# Patient Record
Sex: FEMALE | Race: WHITE | NOT HISPANIC OR LATINO | Employment: OTHER | URBAN - METROPOLITAN AREA
[De-identification: names, ages, dates, MRNs, and addresses within clinical notes are randomized per-mention and may not be internally consistent; named-entity substitution may affect disease eponyms.]

---

## 2017-05-19 ENCOUNTER — APPOINTMENT (OUTPATIENT)
Dept: WOUND CARE | Facility: HOSPITAL | Age: 82
End: 2017-05-19
Attending: SURGERY

## 2021-03-12 ENCOUNTER — TRANSCRIBE ORDERS (OUTPATIENT)
Dept: ADMINISTRATIVE | Facility: HOSPITAL | Age: 86
End: 2021-03-12

## 2021-03-12 DIAGNOSIS — N18.30 STAGE 3 CHRONIC KIDNEY DISEASE, UNSPECIFIED WHETHER STAGE 3A OR 3B CKD (HCC): Primary | ICD-10-CM

## 2021-03-17 ENCOUNTER — HOSPITAL ENCOUNTER (OUTPATIENT)
Dept: ULTRASOUND IMAGING | Facility: HOSPITAL | Age: 86
Discharge: HOME | End: 2021-03-17
Admitting: INTERNAL MEDICINE

## 2021-03-17 DIAGNOSIS — N18.30 STAGE 3 CHRONIC KIDNEY DISEASE, UNSPECIFIED WHETHER STAGE 3A OR 3B CKD (HCC): ICD-10-CM

## 2021-03-17 PROCEDURE — 76775 US EXAM ABDO BACK WALL LIM: CPT

## 2021-03-30 ENCOUNTER — LAB (OUTPATIENT)
Dept: LAB | Facility: HOSPITAL | Age: 86
End: 2021-03-30

## 2021-03-30 ENCOUNTER — TRANSCRIBE ORDERS (OUTPATIENT)
Dept: ADMINISTRATIVE | Facility: HOSPITAL | Age: 86
End: 2021-03-30

## 2021-03-30 DIAGNOSIS — N17.9 ACUTE RENAL FAILURE, UNSPECIFIED ACUTE RENAL FAILURE TYPE (HCC): Primary | ICD-10-CM

## 2021-03-30 DIAGNOSIS — N17.9 ACUTE RENAL FAILURE, UNSPECIFIED ACUTE RENAL FAILURE TYPE (HCC): ICD-10-CM

## 2021-03-30 LAB
ALBUMIN SERPL-MCNC: 3.9 G/DL (ref 3.5–5.2)
ALBUMIN UR-MCNC: 2.1 MG/DL
ALBUMIN/GLOB SERPL: 1.4 G/DL
ALP SERPL-CCNC: 81 U/L (ref 39–117)
ALT SERPL W P-5'-P-CCNC: 11 U/L (ref 1–33)
ANION GAP SERPL CALCULATED.3IONS-SCNC: 10.7 MMOL/L (ref 5–15)
AST SERPL-CCNC: 15 U/L (ref 1–32)
BILIRUB SERPL-MCNC: 0.4 MG/DL (ref 0–1.2)
BUN SERPL-MCNC: 40 MG/DL (ref 8–23)
BUN/CREAT SERPL: 27.4 (ref 7–25)
CALCIUM SPEC-SCNC: 10 MG/DL (ref 8.2–9.6)
CHLORIDE SERPL-SCNC: 103 MMOL/L (ref 98–107)
CO2 SERPL-SCNC: 23.3 MMOL/L (ref 22–29)
CREAT SERPL-MCNC: 1.46 MG/DL (ref 0.57–1)
CREAT UR-MCNC: 58.5 MG/DL
GFR SERPL CREATININE-BSD FRML MDRD: 34 ML/MIN/1.73
GLOBULIN UR ELPH-MCNC: 2.7 GM/DL
GLUCOSE SERPL-MCNC: 119 MG/DL (ref 65–99)
MICROALBUMIN/CREAT UR: 35.9 MG/G
PHOSPHATE SERPL-MCNC: 3.7 MG/DL (ref 2.5–4.5)
POTASSIUM SERPL-SCNC: 4.6 MMOL/L (ref 3.5–5.2)
PROT SERPL-MCNC: 6.6 G/DL (ref 6–8.5)
SODIUM SERPL-SCNC: 137 MMOL/L (ref 136–145)

## 2021-03-30 PROCEDURE — 80053 COMPREHEN METABOLIC PANEL: CPT

## 2021-03-30 PROCEDURE — 82043 UR ALBUMIN QUANTITATIVE: CPT

## 2021-03-30 PROCEDURE — 36415 COLL VENOUS BLD VENIPUNCTURE: CPT

## 2021-03-30 PROCEDURE — 84100 ASSAY OF PHOSPHORUS: CPT

## 2021-03-30 PROCEDURE — 82570 ASSAY OF URINE CREATININE: CPT

## 2022-01-25 ENCOUNTER — CONSULT (OUTPATIENT)
Dept: ONCOLOGY | Facility: CLINIC | Age: 87
End: 2022-01-25

## 2022-01-25 ENCOUNTER — LAB (OUTPATIENT)
Dept: LAB | Facility: HOSPITAL | Age: 87
End: 2022-01-25

## 2022-01-25 VITALS
HEART RATE: 71 BPM | TEMPERATURE: 97.3 F | RESPIRATION RATE: 17 BRPM | BODY MASS INDEX: 22.26 KG/M2 | HEIGHT: 57 IN | DIASTOLIC BLOOD PRESSURE: 85 MMHG | WEIGHT: 103.2 LBS | OXYGEN SATURATION: 91 % | SYSTOLIC BLOOD PRESSURE: 156 MMHG

## 2022-01-25 DIAGNOSIS — E83.52 HYPERCALCEMIA: Primary | ICD-10-CM

## 2022-01-25 DIAGNOSIS — D47.2 MONOCLONAL GAMMOPATHY: Primary | ICD-10-CM

## 2022-01-25 LAB
ALBUMIN SERPL-MCNC: 4.3 G/DL (ref 3.5–5.2)
ALBUMIN/GLOB SERPL: 1.3 G/DL (ref 1.1–2.4)
ALP SERPL-CCNC: 99 U/L (ref 38–116)
ALT SERPL W P-5'-P-CCNC: 11 U/L (ref 0–33)
ANION GAP SERPL CALCULATED.3IONS-SCNC: 11.5 MMOL/L (ref 5–15)
AST SERPL-CCNC: 19 U/L (ref 0–32)
BASOPHILS # BLD AUTO: 0.03 10*3/MM3 (ref 0–0.2)
BASOPHILS NFR BLD AUTO: 0.5 % (ref 0–1.5)
BILIRUB SERPL-MCNC: 0.5 MG/DL (ref 0.2–1.2)
BUN SERPL-MCNC: 43 MG/DL (ref 6–20)
BUN/CREAT SERPL: 33.3 (ref 7.3–30)
CALCIUM SPEC-SCNC: 10.3 MG/DL (ref 8.5–10.2)
CHLORIDE SERPL-SCNC: 101 MMOL/L (ref 98–107)
CO2 SERPL-SCNC: 25.5 MMOL/L (ref 22–29)
CREAT SERPL-MCNC: 1.29 MG/DL (ref 0.6–1.1)
DEPRECATED RDW RBC AUTO: 45.4 FL (ref 37–54)
EOSINOPHIL # BLD AUTO: 0.22 10*3/MM3 (ref 0–0.4)
EOSINOPHIL NFR BLD AUTO: 3.7 % (ref 0.3–6.2)
ERYTHROCYTE [DISTWIDTH] IN BLOOD BY AUTOMATED COUNT: 14.4 % (ref 12.3–15.4)
GFR SERPL CREATININE-BSD FRML MDRD: 39 ML/MIN/1.73
GLOBULIN UR ELPH-MCNC: 3.2 GM/DL (ref 1.8–3.5)
GLUCOSE SERPL-MCNC: 105 MG/DL (ref 74–124)
HCT VFR BLD AUTO: 41.4 % (ref 34–46.6)
HGB BLD-MCNC: 13.3 G/DL (ref 12–15.9)
IMM GRANULOCYTES # BLD AUTO: 0.03 10*3/MM3 (ref 0–0.05)
IMM GRANULOCYTES NFR BLD AUTO: 0.5 % (ref 0–0.5)
LYMPHOCYTES # BLD AUTO: 1.27 10*3/MM3 (ref 0.7–3.1)
LYMPHOCYTES NFR BLD AUTO: 21.2 % (ref 19.6–45.3)
MCH RBC QN AUTO: 27.9 PG (ref 26.6–33)
MCHC RBC AUTO-ENTMCNC: 32.1 G/DL (ref 31.5–35.7)
MCV RBC AUTO: 87 FL (ref 79–97)
MONOCYTES # BLD AUTO: 0.57 10*3/MM3 (ref 0.1–0.9)
MONOCYTES NFR BLD AUTO: 9.5 % (ref 5–12)
NEUTROPHILS NFR BLD AUTO: 3.86 10*3/MM3 (ref 1.7–7)
NEUTROPHILS NFR BLD AUTO: 64.6 % (ref 42.7–76)
NRBC BLD AUTO-RTO: 0 /100 WBC (ref 0–0.2)
PLATELET # BLD AUTO: 158 10*3/MM3 (ref 140–450)
PMV BLD AUTO: 11.4 FL (ref 6–12)
POTASSIUM SERPL-SCNC: 4.2 MMOL/L (ref 3.5–4.7)
PROT SERPL-MCNC: 7.5 G/DL (ref 6.3–8)
RBC # BLD AUTO: 4.76 10*6/MM3 (ref 3.77–5.28)
SODIUM SERPL-SCNC: 138 MMOL/L (ref 134–145)
WBC NRBC COR # BLD: 5.98 10*3/MM3 (ref 3.4–10.8)

## 2022-01-25 PROCEDURE — 99205 OFFICE O/P NEW HI 60 MIN: CPT | Performed by: INTERNAL MEDICINE

## 2022-01-25 PROCEDURE — 85025 COMPLETE CBC W/AUTO DIFF WBC: CPT

## 2022-01-25 PROCEDURE — 36415 COLL VENOUS BLD VENIPUNCTURE: CPT

## 2022-01-25 PROCEDURE — 80053 COMPREHEN METABOLIC PANEL: CPT | Performed by: INTERNAL MEDICINE

## 2022-01-25 RX ORDER — UREA 10 %
1 LOTION (ML) TOPICAL DAILY
COMMUNITY
End: 2022-10-06 | Stop reason: HOSPADM

## 2022-01-25 RX ORDER — CLONIDINE HYDROCHLORIDE 0.1 MG/1
1 TABLET ORAL 2 TIMES DAILY
COMMUNITY
Start: 2021-12-03 | End: 2022-10-06 | Stop reason: HOSPADM

## 2022-01-25 RX ORDER — SIMETHICONE 80 MG
TABLET,CHEWABLE ORAL EVERY 6 HOURS PRN
COMMUNITY
End: 2022-10-06 | Stop reason: HOSPADM

## 2022-01-25 RX ORDER — CHLORDIAZEPOXIDE HYDROCHLORIDE AND CLIDINIUM BROMIDE 5; 2.5 MG/1; MG/1
1 CAPSULE ORAL 2 TIMES DAILY
COMMUNITY
Start: 2021-12-06

## 2022-01-25 RX ORDER — FAMOTIDINE 20 MG/1
20 TABLET, FILM COATED ORAL
COMMUNITY
End: 2022-10-06 | Stop reason: HOSPADM

## 2022-01-25 RX ORDER — LEVOTHYROXINE SODIUM 0.05 MG/1
TABLET ORAL
COMMUNITY
Start: 2021-10-05 | End: 2022-10-06 | Stop reason: HOSPADM

## 2022-01-25 RX ORDER — MELATONIN
1000 DAILY
COMMUNITY

## 2022-01-25 RX ORDER — AMLODIPINE BESYLATE 5 MG/1
1 TABLET ORAL 2 TIMES DAILY
COMMUNITY
Start: 2022-01-18 | End: 2022-10-06 | Stop reason: HOSPADM

## 2022-01-27 LAB
ALBUMIN SERPL ELPH-MCNC: 3.8 G/DL (ref 2.9–4.4)
ALBUMIN/GLOB SERPL: 1.2 {RATIO} (ref 0.7–1.7)
ALPHA1 GLOB SERPL ELPH-MCNC: 0.3 G/DL (ref 0–0.4)
ALPHA2 GLOB SERPL ELPH-MCNC: 0.8 G/DL (ref 0.4–1)
B-GLOBULIN SERPL ELPH-MCNC: 1 G/DL (ref 0.7–1.3)
GAMMA GLOB SERPL ELPH-MCNC: 1.3 G/DL (ref 0.4–1.8)
GLOBULIN SER-MCNC: 3.4 G/DL (ref 2.2–3.9)
IGA SERPL-MCNC: 288 MG/DL (ref 64–422)
IGG SERPL-MCNC: 1074 MG/DL (ref 586–1602)
IGM SERPL-MCNC: 127 MG/DL (ref 26–217)
INTERPRETATION SERPL IEP-IMP: ABNORMAL
KAPPA LC FREE SER-MCNC: 43 MG/L (ref 3.3–19.4)
KAPPA LC FREE/LAMBDA FREE SER: 1.87 {RATIO} (ref 0.26–1.65)
LABORATORY COMMENT REPORT: ABNORMAL
LAMBDA LC FREE SERPL-MCNC: 23 MG/L (ref 5.7–26.3)
M PROTEIN SERPL ELPH-MCNC: ABNORMAL G/DL
PROT SERPL-MCNC: 7.2 G/DL (ref 6–8.5)

## 2022-02-01 ENCOUNTER — HOSPITAL ENCOUNTER (OUTPATIENT)
Dept: GENERAL RADIOLOGY | Facility: HOSPITAL | Age: 87
Discharge: HOME | End: 2022-02-01
Admitting: INTERNAL MEDICINE

## 2022-02-01 PROCEDURE — 77075 RADEX OSSEOUS SURVEY COMPL: CPT

## 2022-02-09 ENCOUNTER — LAB (OUTPATIENT)
Dept: LAB | Facility: HOSPITAL | Age: 87
End: 2022-02-09

## 2022-02-11 LAB
ALBUMIN 24H MFR UR ELPH: 27.9 %
ALPHA1 GLOB 24H MFR UR ELPH: 3.8 %
ALPHA2 GLOB 24H MFR UR ELPH: 18.4 %
B-GLOBULIN MFR UR ELPH: 28.2 %
GAMMA GLOB 24H MFR UR ELPH: 21.8 %
HIV 1 & 2 AB SER-IMP: NORMAL
INTERPRETATION UR IFE-IMP: NORMAL
KAPPA LC FREE 24H UR-MRATE: 72.24 MG/24 HR
KAPPA LC UR-MCNC: 55.57 MG/L (ref 0.63–113.79)
KAPPA LC/LAMBDA UR: 9.65 {RATIO} (ref 1.03–31.76)
LAMBDA LC FREE 24H UR-MRATE: 7.49 MG/24 HR
LAMBDA LC UR-MCNC: 5.76 MG/L (ref 0.47–11.77)
M PROTEIN 24H MFR UR ELPH: NORMAL %
PROT 24H UR-MRATE: 64 MG/24 HR (ref 30–150)
PROT UR-MCNC: 4.9 MG/DL

## 2022-02-15 ENCOUNTER — APPOINTMENT (OUTPATIENT)
Dept: LAB | Facility: HOSPITAL | Age: 87
End: 2022-02-15

## 2022-02-15 ENCOUNTER — OFFICE VISIT (OUTPATIENT)
Dept: ONCOLOGY | Facility: CLINIC | Age: 87
End: 2022-02-15

## 2022-02-15 VITALS
DIASTOLIC BLOOD PRESSURE: 75 MMHG | WEIGHT: 101.7 LBS | BODY MASS INDEX: 21.94 KG/M2 | HEART RATE: 70 BPM | RESPIRATION RATE: 17 BRPM | HEIGHT: 57 IN | TEMPERATURE: 97.4 F | SYSTOLIC BLOOD PRESSURE: 132 MMHG | OXYGEN SATURATION: 96 %

## 2022-02-15 DIAGNOSIS — D47.2 MONOCLONAL GAMMOPATHY: Primary | ICD-10-CM

## 2022-02-15 PROCEDURE — 99215 OFFICE O/P EST HI 40 MIN: CPT | Performed by: INTERNAL MEDICINE

## 2022-02-15 NOTE — PROGRESS NOTES
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 provider found  RECORDS OBTAINED:  Records of the patients history including those from the electronic medical record were reviewed and summarized in detail.    HISTORY OF PRESENT ILLNESS:    The patient is a 89 y.o. year old female with past medical history significant for HTN, CKD & hypothyroidism who was noted to have elevated free light chains, kappa 76.54, lambda 7.13 and FLC ratio 10.73 on a lab work performed by , nephrologist on 21 to evaluate for chronic hypercalcemia. This prompted referral to this clinic & seen first on 22.   Patient is an elderly female who is accompanied by her friend. She uses a walker to ambulate & has severe kyphosis.   Per review of records, patient appears to have a history of CKD (serum creatinine in 1.3-1.5 range) , secondary hyperparathyroidism and mild hypercalcemia (9-10.5 gm/dl range) at least since 2018.  Denies any major complaints. No specific bone pain. Denies any fever, chills, night sweats or palpable lymph nodes. Denies GI or  issues. Patient lives by herself. Her  is .     2022: SPEP/KENTON & UPEP/KENTON show no M-proteins. FLC show only mildly elevated FLC ratio on 1.87.     INTERIM HISTORY:  Patient returns to the clinic. Denies any new complaints.     Past Medical History:   Diagnosis Date   • Hypertension    • Thyroid disease      No past surgical history on file.    MEDICATIONS    Current Outpatient Medications:   •  Acetaminophen 325 MG capsule, Take  by mouth., Disp: , Rfl:   •  amLODIPine (NORVASC) 5 MG tablet, Take 1 tablet by mouth 2 (Two) Times a Day., Disp: , Rfl:   •  ascorbic acid (VITAMIN C) 1000 MG tablet, Take 1,000 mg by mouth., Disp: , Rfl:   •  calcium carbonate (OS-TORREY) 1250 (500 Ca) MG  chewable tablet, Chew 1 tablet Daily., Disp: , Rfl:   •  cholecalciferol (Cholecalciferol) 25 MCG (1000 UT) tablet, Take 1,000 Units by mouth Daily., Disp: , Rfl:   •  clidinium-chlordiazePOXIDE (LIBRAX) 5-2.5 MG per capsule, Take 1 capsule by mouth 2 (Two) Times a Day., Disp: , Rfl:   •  cloNIDine (CATAPRES) 0.1 MG tablet, Take 1 tablet by mouth 2 (Two) Times a Day., Disp: , Rfl:   •  famotidine (PEPCID) 20 MG tablet, Take 20 mg by mouth., Disp: , Rfl:   •  HYDROcodone-acetaminophen (NORCO) 5-325 MG per tablet, Take 1 tablet by mouth Every 8 (Eight) Hours As Needed for Severe Pain (7-10)., Disp: 24 tablet, Rfl: 0  •  levothyroxine (Synthroid) 50 MCG tablet, TAKE 1 TABLET DAILY EXCEPT FOR MONDAY, WEDNESDAY, AND FRIDAY TAKE 2 TABLETS, Disp: , Rfl:   •  levothyroxine (SYNTHROID, LEVOTHROID) 75 MCG tablet, Take 75 mcg by mouth Daily., Disp: , Rfl:   •  losartan (COZAAR) 25 MG tablet, Take 25 mg by mouth Daily., Disp: , Rfl:   •  mupirocin (BACTROBAN) 2 % ointment, Apply  topically 2 (Two) Times a Day. To both feet, Disp: 30 g, Rfl: 1  •  pantoprazole (PROTONIX) 20 MG EC tablet, Take 20 mg by mouth Daily., Disp: , Rfl:   •  PROBIOTIC PRODUCT PO, Take  by mouth., Disp: , Rfl:   •  silver sulfadiazine (SILVADENE, SSD) 1 % cream, Apply  topically 2 (Two) Times a Day., Disp: 50 g, Rfl: 0  •  simethicone (Gas-X) 80 MG chewable tablet, Chew Every 6 (Six) Hours As Needed., Disp: , Rfl:   •  UNKNOWN TO PATIENT, Additional blood pressure medication, Disp: , Rfl:     ALLERGIES:     Allergies   Allergen Reactions   • Penicillins        SOCIAL HISTORY:       Social History     Socioeconomic History   • Marital status:    Tobacco Use   • Smoking status: Never Smoker   Substance and Sexual Activity   • Alcohol use: No         FAMILY HISTORY:  No family history on file.    REVIEW OF SYSTEMS:  Constitutional: [No fevers, chills, sweats]  Eye: [No recent visual problems]  ENMT: [No ear pain, nasal congestion, sore  "throat]  Respiratory: [No shortness of breath, cough]  Cardiovascular: [No Chest pain, palpitations, syncope]  Gastrointestinal: [No nausea, vomiting, diarrhea]  Genitourinary: [No hematuria]  Hema/Lymph: [Negative for bruising tendency, swollen lymph glands]  Endocrine: [Negative for excessive thirst, excessive hunger]  Musculoskeletal: [Denies any musculoskeletal pain or swelling]  Integumentary: [No rash, pruritus, abrasions]  Neurologic: [ No weakness or numbness, Alert & oriented X 4]       Vitals:    02/15/22 1258   BP: 132/75   Pulse: 70   Resp: 17   Temp: 97.4 °F (36.3 °C)   TempSrc: Temporal   SpO2: 96%   Weight: 46.1 kg (101 lb 11.2 oz)   Height: 144.8 cm (57.01\")   PainSc: 0-No pain     Current Status 2/15/2022   ECOG score 0      PHYSICAL EXAM:    CONSTITUTIONAL:  Vital signs reviewed.  No distress, looks comfortable. Elderly female  EYES:  Conjunctiva and lids unremarkable.   EARS,NOSE,MOUTH,THROAT:  Ears and nose appear unremarkable.  Lips, teeth, gums appear unremarkable.  RESPIRATORY:  Normal respiratory effort.  Lungs clear to auscultation bilaterally.  CARDIOVASCULAR:  Normal S1, S2.  No murmurs rubs or gallops.  No significant lower extremity edema.  GASTROINTESTINAL: Abdomen appears unremarkable.  Nondistended  LYMPHATIC:  No cervical, supraclavicular lymphadenopathy.  NEURO: AAO x 3,  No focal deficits.  Appears to have equal strength all 4 extremities.  MUSCULOSKELETAL:  Unremarkable digits/nails.  No cyanosis or clubbing.  Has kyphosis  SKIN:  Warm.  No rashes.  PSYCHIATRIC:  Normal judgment and insight.  Normal mood and affect.     RECENT LABS:        No visits with results within 7 Day(s) from this visit.   Latest known visit with results is:   Consult on 01/25/2022   Component Date Value Ref Range Status   • Glucose 01/25/2022 105  74 - 124 mg/dL Final   • BUN 01/25/2022 43* 6 - 20 mg/dL Final   • Creatinine 01/25/2022 1.29* 0.60 - 1.10 mg/dL Final   • Sodium 01/25/2022 138  134 - 145 " mmol/L Final   • Potassium 01/25/2022 4.2  3.5 - 4.7 mmol/L Final   • Chloride 01/25/2022 101  98 - 107 mmol/L Final   • CO2 01/25/2022 25.5  22.0 - 29.0 mmol/L Final   • Calcium 01/25/2022 10.3* 8.5 - 10.2 mg/dL Final   • Total Protein 01/25/2022 7.5  6.3 - 8.0 g/dL Final   • Albumin 01/25/2022 4.30  3.50 - 5.20 g/dL Final   • ALT (SGPT) 01/25/2022 11  0 - 33 U/L Final   • AST (SGOT) 01/25/2022 19  0 - 32 U/L Final   • Alkaline Phosphatase 01/25/2022 99  38 - 116 U/L Final   • Total Bilirubin 01/25/2022 0.5  0.2 - 1.2 mg/dL Final   • eGFR Non  Amer 01/25/2022 39* >60 mL/min/1.73 Final   • Globulin 01/25/2022 3.2  1.8 - 3.5 gm/dL Final   • A/G Ratio 01/25/2022 1.3  1.1 - 2.4 g/dL Final   • BUN/Creatinine Ratio 01/25/2022 33.3* 7.3 - 30.0 Final   • Anion Gap 01/25/2022 11.5  5.0 - 15.0 mmol/L Final   • IgG 01/25/2022 1074  586 - 1602 mg/dL Final   • IgA 01/25/2022 288  64 - 422 mg/dL Final   • IgM 01/25/2022 127  26 - 217 mg/dL Final   • Total Protein 01/25/2022 7.2  6.0 - 8.5 g/dL Final   • Albumin 01/25/2022 3.8  2.9 - 4.4 g/dL Final   • Alpha-1-Globulin 01/25/2022 0.3  0.0 - 0.4 g/dL Final   • Alpha-2-Globulin 01/25/2022 0.8  0.4 - 1.0 g/dL Final   • Beta Globulin 01/25/2022 1.0  0.7 - 1.3 g/dL Final   • Gamma Globulin 01/25/2022 1.3  0.4 - 1.8 g/dL Final   • M-Damián 01/25/2022 Not Observed  Not Observed g/dL Final   • Globulin 01/25/2022 3.4  2.2 - 3.9 g/dL Final   • A/G Ratio 01/25/2022 1.2  0.7 - 1.7 Final   • Immunofixation Reflex, Serum 01/25/2022 Comment:   Final    Presence of monoclonal protein is unclear at this time. Suggest  repeat in 3 to 6 months if clinically indicated.   • Please note 01/25/2022 Comment   Final    Protein electrophoresis scan will follow via computer, mail, or   delivery.   • Free Light Chain, Kappa 01/25/2022 43.0* 3.3 - 19.4 mg/L Final   • Free Lambda Light Chains 01/25/2022 23.0  5.7 - 26.3 mg/L Final   • Kappa/Lambda Ratio 01/25/2022 1.87* 0.26 - 1.65 Final   •  Total Protein, Urine 2022 4.9  Not Estab. mg/dL Final   • Protein, 24H Urine 2022 64  30 - 150 mg/24 hr Final   • Albumin, U 2022 27.9  % Final   • Alpha-1-Globulin, U 2022 3.8  % Final   • Alpha-2-Globulin, U 2022 18.4  % Final   • Beta Globulin, U 2022 28.2  % Final   • Gamma Globulin, Urine 2022 21.8  % Final   • M-Damián, % U 2022 Not Observed  Not Observed % Final   • Immunofixation Result, Urine 2022 Comment   Final    No monoclonality detected.   • Note: 2022 Comment   Final    Protein electrophoresis scan will follow via computer, mail, or   delivery.   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     # Suspected monoclonal gammopathy:  · Patient was noted to have elevated free light chains, kappa 76.54, lambda 7.13 and FLC ratio 10.73 on a lab work performed by , nephrologist on 21 to evaluate for chronic hypercalcemia.   · Per review of records, patient appears to have a history of CKD (serum creatinine in 1.3-1.5 range) , secondary hyperparathyroidism and mild hypercalcemia (9-10.5 gm/dl range) at least since 2018.  Denies any major complaints. No specific bone pain. Denies any fever, chills, night sweats or palpable lymph nodes. No point bone tenderness. Denies GI or  issues. Patient lives by herself. Her  is .   Patient has mildly elevated serum creatinine and calcium levels. No cytopenias. No specific bone pain. Mild  elevation in serum free light chains could be seen with CKD, however, FLC ratio of 10.73 warrants further work up.   Discussed various causes of abnormal M-protein detected in serum, including infections, inflammation (as a component of polyclonal proliferation) & plasma cell dyscrasias (multiple myeloma, SMM, MGUS and waldenstrom's macroglobulinemia etc). There is a low risk ~ 1% per year of progression to multiple myeloma.  A repeat SPEP/KENTON, FLC & UPEP/KENTON performed in January 2022 show no evidence of M-spike. FLC ratio only mildly elevated at 1.87. This is not consistent with MGUS or any other plasma cell dyscrasia. Mild FLC elevation could be seen with CKD.  A skeletal survey negative for any lytic lesions.   Discussed plan to monitor & repeat labs in 6 months. Patient is agreeable.    PLAN:   - Repeat SPEP, UPEP, & skeletal survey not consistent with MGUS or any other plasma cell dyscrasia.   - Will monitor & repeat labs in 6 months.  - Advised to maintain f/u with PCP & nephrology.    Orders Placed This Encounter   Procedures   • Comprehensive Metabolic Panel     Standing Status:   Future     Standing Expiration Date:   2/15/2023     Order Specific Question:   Release to patient     Answer:   Immediate   • KENTON,PE and FLC, Serum     Standing Status:   Future     Standing Expiration Date:   2/15/2023     Order Specific Question:   Release to patient     Answer:   Immediate   • CBC & Differential     Standing Status:   Future     Standing Expiration Date:   2/15/2023     Order Specific Question:   Manual Differential     Answer:   No     Total time spent during this patient encounter is 45 minutes. The total time spent with the patient includes at least one or more of the following: preparing to see the patient by reviewing of tests, prior notes or other relevant information, performing appropriate independent examination & evaluation, counseling, ordering of medications, tests or procedures, communicating with  other healthcare professionals, when appropriate to coordinate care, documenting clinic information in the electronic medical records or other health records, independently interpreting results of tests and communicating the results to the patient/family or caregiver.

## 2022-08-02 ENCOUNTER — APPOINTMENT (OUTPATIENT)
Dept: LAB | Facility: HOSPITAL | Age: 87
End: 2022-08-02

## 2022-09-29 ENCOUNTER — APPOINTMENT (OUTPATIENT)
Dept: GENERAL RADIOLOGY | Facility: HOSPITAL | Age: 87
End: 2022-09-29

## 2022-09-29 ENCOUNTER — HOSPITAL ENCOUNTER (INPATIENT)
Facility: HOSPITAL | Age: 87
LOS: 4 days | Discharge: NURSING, REHAB OR LONG-TERM CARE | End: 2022-10-06
Attending: EMERGENCY MEDICINE | Admitting: HOSPITALIST

## 2022-09-29 DIAGNOSIS — T30.0 PARTIAL THICKNESS BURNS OF MULTIPLE SITES: ICD-10-CM

## 2022-09-29 DIAGNOSIS — I50.40 COMBINED SYSTOLIC AND DIASTOLIC CONGESTIVE HEART FAILURE, UNSPECIFIED HF CHRONICITY: Primary | ICD-10-CM

## 2022-09-29 PROBLEM — I50.9 CHF (CONGESTIVE HEART FAILURE): Status: ACTIVE | Noted: 2022-09-29

## 2022-09-29 LAB
ALBUMIN SERPL-MCNC: 4 G/DL (ref 3.5–5.2)
ALBUMIN/GLOB SERPL: 1.5 G/DL
ALP SERPL-CCNC: 92 U/L (ref 39–117)
ALT SERPL W P-5'-P-CCNC: 19 U/L (ref 1–33)
ANION GAP SERPL CALCULATED.3IONS-SCNC: 12.1 MMOL/L (ref 5–15)
APTT PPP: 33.4 SECONDS (ref 22.7–35.4)
AST SERPL-CCNC: 27 U/L (ref 1–32)
BASOPHILS # BLD AUTO: 0.04 10*3/MM3 (ref 0–0.2)
BASOPHILS NFR BLD AUTO: 0.7 % (ref 0–1.5)
BILIRUB SERPL-MCNC: 0.7 MG/DL (ref 0–1.2)
BUN SERPL-MCNC: 31 MG/DL (ref 8–23)
BUN/CREAT SERPL: 25.2 (ref 7–25)
CALCIUM SPEC-SCNC: 10 MG/DL (ref 8.2–9.6)
CHLORIDE SERPL-SCNC: 104 MMOL/L (ref 98–107)
CO2 SERPL-SCNC: 22.9 MMOL/L (ref 22–29)
CREAT SERPL-MCNC: 1.23 MG/DL (ref 0.57–1)
DEPRECATED RDW RBC AUTO: 45.5 FL (ref 37–54)
EGFRCR SERPLBLD CKD-EPI 2021: 41.3 ML/MIN/1.73
EOSINOPHIL # BLD AUTO: 0.17 10*3/MM3 (ref 0–0.4)
EOSINOPHIL NFR BLD AUTO: 2.9 % (ref 0.3–6.2)
ERYTHROCYTE [DISTWIDTH] IN BLOOD BY AUTOMATED COUNT: 14.1 % (ref 12.3–15.4)
GLOBULIN UR ELPH-MCNC: 2.6 GM/DL
GLUCOSE SERPL-MCNC: 110 MG/DL (ref 65–99)
HCT VFR BLD AUTO: 35.9 % (ref 34–46.6)
HGB BLD-MCNC: 11.2 G/DL (ref 12–15.9)
IMM GRANULOCYTES # BLD AUTO: 0.02 10*3/MM3 (ref 0–0.05)
IMM GRANULOCYTES NFR BLD AUTO: 0.3 % (ref 0–0.5)
INR PPP: 1.24 (ref 0.9–1.1)
LYMPHOCYTES # BLD AUTO: 0.97 10*3/MM3 (ref 0.7–3.1)
LYMPHOCYTES NFR BLD AUTO: 16.8 % (ref 19.6–45.3)
MAGNESIUM SERPL-MCNC: 2.4 MG/DL (ref 1.7–2.3)
MCH RBC QN AUTO: 27.7 PG (ref 26.6–33)
MCHC RBC AUTO-ENTMCNC: 31.2 G/DL (ref 31.5–35.7)
MCV RBC AUTO: 88.9 FL (ref 79–97)
MONOCYTES # BLD AUTO: 0.52 10*3/MM3 (ref 0.1–0.9)
MONOCYTES NFR BLD AUTO: 9 % (ref 5–12)
NEUTROPHILS NFR BLD AUTO: 4.07 10*3/MM3 (ref 1.7–7)
NEUTROPHILS NFR BLD AUTO: 70.3 % (ref 42.7–76)
NRBC BLD AUTO-RTO: 0 /100 WBC (ref 0–0.2)
NT-PROBNP SERPL-MCNC: 5135 PG/ML (ref 0–1800)
PLATELET # BLD AUTO: 160 10*3/MM3 (ref 140–450)
PMV BLD AUTO: 12 FL (ref 6–12)
POTASSIUM SERPL-SCNC: 4.5 MMOL/L (ref 3.5–5.2)
PROT SERPL-MCNC: 6.6 G/DL (ref 6–8.5)
PROTHROMBIN TIME: 15.4 SECONDS (ref 11.7–14.2)
QT INTERVAL: 524 MS
RBC # BLD AUTO: 4.04 10*6/MM3 (ref 3.77–5.28)
SODIUM SERPL-SCNC: 139 MMOL/L (ref 136–145)
TROPONIN T SERPL-MCNC: 0.01 NG/ML (ref 0–0.03)
TROPONIN T SERPL-MCNC: 0.01 NG/ML (ref 0–0.03)
TSH SERPL DL<=0.05 MIU/L-ACNC: 4.13 UIU/ML (ref 0.27–4.2)
WBC NRBC COR # BLD: 5.79 10*3/MM3 (ref 3.4–10.8)

## 2022-09-29 PROCEDURE — 85610 PROTHROMBIN TIME: CPT | Performed by: EMERGENCY MEDICINE

## 2022-09-29 PROCEDURE — 93010 ELECTROCARDIOGRAM REPORT: CPT | Performed by: INTERNAL MEDICINE

## 2022-09-29 PROCEDURE — 83880 ASSAY OF NATRIURETIC PEPTIDE: CPT | Performed by: EMERGENCY MEDICINE

## 2022-09-29 PROCEDURE — 80053 COMPREHEN METABOLIC PANEL: CPT | Performed by: EMERGENCY MEDICINE

## 2022-09-29 PROCEDURE — 93005 ELECTROCARDIOGRAM TRACING: CPT

## 2022-09-29 PROCEDURE — G0378 HOSPITAL OBSERVATION PER HR: HCPCS

## 2022-09-29 PROCEDURE — 99285 EMERGENCY DEPT VISIT HI MDM: CPT

## 2022-09-29 PROCEDURE — 84443 ASSAY THYROID STIM HORMONE: CPT | Performed by: EMERGENCY MEDICINE

## 2022-09-29 PROCEDURE — 93005 ELECTROCARDIOGRAM TRACING: CPT | Performed by: EMERGENCY MEDICINE

## 2022-09-29 PROCEDURE — 84484 ASSAY OF TROPONIN QUANT: CPT | Performed by: EMERGENCY MEDICINE

## 2022-09-29 PROCEDURE — 25010000002 FUROSEMIDE PER 20 MG: Performed by: EMERGENCY MEDICINE

## 2022-09-29 PROCEDURE — 85730 THROMBOPLASTIN TIME PARTIAL: CPT | Performed by: EMERGENCY MEDICINE

## 2022-09-29 PROCEDURE — 85025 COMPLETE CBC W/AUTO DIFF WBC: CPT | Performed by: EMERGENCY MEDICINE

## 2022-09-29 PROCEDURE — 83735 ASSAY OF MAGNESIUM: CPT | Performed by: EMERGENCY MEDICINE

## 2022-09-29 PROCEDURE — 71045 X-RAY EXAM CHEST 1 VIEW: CPT

## 2022-09-29 RX ORDER — CARVEDILOL 12.5 MG/1
12.5 TABLET ORAL 2 TIMES DAILY WITH MEALS
COMMUNITY
End: 2022-10-06 | Stop reason: HOSPADM

## 2022-09-29 RX ORDER — SODIUM CHLORIDE 0.9 % (FLUSH) 0.9 %
10 SYRINGE (ML) INJECTION AS NEEDED
Status: DISCONTINUED | OUTPATIENT
Start: 2022-09-29 | End: 2022-10-06 | Stop reason: HOSPADM

## 2022-09-29 RX ORDER — FUROSEMIDE 10 MG/ML
80 INJECTION INTRAMUSCULAR; INTRAVENOUS ONCE
Status: COMPLETED | OUTPATIENT
Start: 2022-09-29 | End: 2022-09-29

## 2022-09-29 RX ORDER — FUROSEMIDE 10 MG/ML
40 INJECTION INTRAMUSCULAR; INTRAVENOUS
Status: DISCONTINUED | OUTPATIENT
Start: 2022-09-30 | End: 2022-10-01

## 2022-09-29 RX ORDER — PANTOPRAZOLE SODIUM 40 MG/1
40 TABLET, DELAYED RELEASE ORAL ONCE
Status: COMPLETED | OUTPATIENT
Start: 2022-09-30 | End: 2022-09-29

## 2022-09-29 RX ORDER — ASPIRIN 81 MG/1
81 TABLET, CHEWABLE ORAL DAILY
Status: DISCONTINUED | OUTPATIENT
Start: 2022-09-30 | End: 2022-10-06 | Stop reason: HOSPADM

## 2022-09-29 RX ORDER — LEVOTHYROXINE SODIUM 0.07 MG/1
75 TABLET ORAL
Status: DISCONTINUED | OUTPATIENT
Start: 2022-09-30 | End: 2022-10-01

## 2022-09-29 RX ORDER — CARVEDILOL 3.12 MG/1
3.12 TABLET ORAL 2 TIMES DAILY WITH MEALS
Status: DISCONTINUED | OUTPATIENT
Start: 2022-09-30 | End: 2022-09-30

## 2022-09-29 RX ORDER — PANTOPRAZOLE SODIUM 40 MG/1
40 TABLET, DELAYED RELEASE ORAL
Status: DISCONTINUED | OUTPATIENT
Start: 2022-09-30 | End: 2022-09-30

## 2022-09-29 RX ORDER — LOSARTAN POTASSIUM 100 MG/1
100 TABLET ORAL
Status: DISCONTINUED | OUTPATIENT
Start: 2022-09-30 | End: 2022-09-30

## 2022-09-29 RX ADMIN — CARVEDILOL 3.12 MG: 3.12 TABLET, FILM COATED ORAL at 23:44

## 2022-09-29 RX ADMIN — NITROGLYCERIN 1 INCH: 20 OINTMENT TOPICAL at 23:43

## 2022-09-29 RX ADMIN — PANTOPRAZOLE SODIUM 40 MG: 40 TABLET, DELAYED RELEASE ORAL at 23:42

## 2022-09-29 RX ADMIN — FUROSEMIDE 80 MG: 10 INJECTION, SOLUTION INTRAMUSCULAR; INTRAVENOUS at 17:00

## 2022-09-30 ENCOUNTER — APPOINTMENT (OUTPATIENT)
Dept: CARDIOLOGY | Facility: HOSPITAL | Age: 87
End: 2022-09-30

## 2022-09-30 LAB
ANION GAP SERPL CALCULATED.3IONS-SCNC: 13.9 MMOL/L (ref 5–15)
AORTIC DIMENSIONLESS INDEX: 0.2 (DI)
ASCENDING AORTA: 3.1 CM
BASOPHILS # BLD AUTO: 0.04 10*3/MM3 (ref 0–0.2)
BASOPHILS NFR BLD AUTO: 0.6 % (ref 0–1.5)
BH CV ECHO MEAS - ACS: 1.55 CM
BH CV ECHO MEAS - AO MAX PG: 73.2 MMHG
BH CV ECHO MEAS - AO MEAN PG: 41.5 MMHG
BH CV ECHO MEAS - AO ROOT DIAM: 3.1 CM
BH CV ECHO MEAS - AO V2 MAX: 427.7 CM/SEC
BH CV ECHO MEAS - AO V2 VTI: 107.4 CM
BH CV ECHO MEAS - AVA(I,D): 0.63 CM2
BH CV ECHO MEAS - EDV(CUBED): 57.7 ML
BH CV ECHO MEAS - EDV(MOD-SP2): 64 ML
BH CV ECHO MEAS - EDV(MOD-SP4): 62 ML
BH CV ECHO MEAS - EF(MOD-BP): 65.8 %
BH CV ECHO MEAS - EF(MOD-SP2): 70.3 %
BH CV ECHO MEAS - EF(MOD-SP4): 59.7 %
BH CV ECHO MEAS - ESV(CUBED): 16.9 ML
BH CV ECHO MEAS - ESV(MOD-SP2): 19 ML
BH CV ECHO MEAS - ESV(MOD-SP4): 25 ML
BH CV ECHO MEAS - FS: 33.6 %
BH CV ECHO MEAS - IVS/LVPW: 1.01 CM
BH CV ECHO MEAS - IVSD: 1.2 CM
BH CV ECHO MEAS - LAT PEAK E' VEL: 2.5 CM/SEC
BH CV ECHO MEAS - LV DIASTOLIC VOL/BSA (35-75): 41 CM2
BH CV ECHO MEAS - LV MASS(C)D: 155.9 GRAMS
BH CV ECHO MEAS - LV MAX PG: 2.3 MMHG
BH CV ECHO MEAS - LV MEAN PG: 1.11 MMHG
BH CV ECHO MEAS - LV SYSTOLIC VOL/BSA (12-30): 16.5 CM2
BH CV ECHO MEAS - LV V1 MAX: 75.8 CM/SEC
BH CV ECHO MEAS - LV V1 VTI: 22.2 CM
BH CV ECHO MEAS - LVIDD: 3.9 CM
BH CV ECHO MEAS - LVIDS: 2.6 CM
BH CV ECHO MEAS - LVOT AREA: 3 CM2
BH CV ECHO MEAS - LVOT DIAM: 1.97 CM
BH CV ECHO MEAS - LVPWD: 1.19 CM
BH CV ECHO MEAS - MED PEAK E' VEL: 2.27 CM/SEC
BH CV ECHO MEAS - MV A DUR: 0.17 SEC
BH CV ECHO MEAS - MV A MAX VEL: 95.5 CM/SEC
BH CV ECHO MEAS - MV DEC SLOPE: 213 CM/SEC2
BH CV ECHO MEAS - MV DEC TIME: 0.37 MSEC
BH CV ECHO MEAS - MV E MAX VEL: 56.6 CM/SEC
BH CV ECHO MEAS - MV E/A: 0.59
BH CV ECHO MEAS - MV MAX PG: 3.7 MMHG
BH CV ECHO MEAS - MV MEAN PG: 1.27 MMHG
BH CV ECHO MEAS - MV P1/2T: 80 MSEC
BH CV ECHO MEAS - MV V2 VTI: 26.4 CM
BH CV ECHO MEAS - MVA(P1/2T): 2.7 CM2
BH CV ECHO MEAS - MVA(VTI): 2.6 CM2
BH CV ECHO MEAS - PA ACC TIME: 0.18 SEC
BH CV ECHO MEAS - PA PR(ACCEL): -1.81 MMHG
BH CV ECHO MEAS - PA V2 MAX: 71.8 CM/SEC
BH CV ECHO MEAS - PULM A REVS DUR: 0.17 SEC
BH CV ECHO MEAS - PULM A REVS VEL: 24.4 CM/SEC
BH CV ECHO MEAS - PULM DIAS VEL: 33.4 CM/SEC
BH CV ECHO MEAS - PULM S/D: 1.51
BH CV ECHO MEAS - PULM SYS VEL: 50.6 CM/SEC
BH CV ECHO MEAS - QP/QS: 0.63
BH CV ECHO MEAS - RAP SYSTOLE: 3 MMHG
BH CV ECHO MEAS - RV MAX PG: 1.38 MMHG
BH CV ECHO MEAS - RV V1 MAX: 58.7 CM/SEC
BH CV ECHO MEAS - RV V1 VTI: 13.1 CM
BH CV ECHO MEAS - RVOT DIAM: 2.04 CM
BH CV ECHO MEAS - RVSP: 27 MMHG
BH CV ECHO MEAS - SI(MOD-SP2): 29.8 ML/M2
BH CV ECHO MEAS - SI(MOD-SP4): 24.5 ML/M2
BH CV ECHO MEAS - SV(LVOT): 67.6 ML
BH CV ECHO MEAS - SV(MOD-SP2): 45 ML
BH CV ECHO MEAS - SV(MOD-SP4): 37 ML
BH CV ECHO MEAS - SV(RVOT): 42.7 ML
BH CV ECHO MEAS - TAPSE (>1.6): 1.73 CM
BH CV ECHO MEAS - TR MAX PG: 24.1 MMHG
BH CV ECHO MEAS - TR MAX VEL: 245.6 CM/SEC
BH CV ECHO MEASUREMENTS AVERAGE E/E' RATIO: 23.73
BH CV XLRA - RV BASE: 3 CM
BH CV XLRA - RV LENGTH: 5.5 CM
BH CV XLRA - RV MID: 3 CM
BH CV XLRA - TDI S': 9.5 CM/SEC
BUN SERPL-MCNC: 32 MG/DL (ref 8–23)
BUN/CREAT SERPL: 32.3 (ref 7–25)
CALCIUM SPEC-SCNC: 10 MG/DL (ref 8.2–9.6)
CHLORIDE SERPL-SCNC: 101 MMOL/L (ref 98–107)
CO2 SERPL-SCNC: 27.1 MMOL/L (ref 22–29)
CREAT SERPL-MCNC: 0.99 MG/DL (ref 0.57–1)
DEPRECATED RDW RBC AUTO: 43.1 FL (ref 37–54)
EGFRCR SERPLBLD CKD-EPI 2021: 53.6 ML/MIN/1.73
EOSINOPHIL # BLD AUTO: 0.19 10*3/MM3 (ref 0–0.4)
EOSINOPHIL NFR BLD AUTO: 2.8 % (ref 0.3–6.2)
ERYTHROCYTE [DISTWIDTH] IN BLOOD BY AUTOMATED COUNT: 13.8 % (ref 12.3–15.4)
GLUCOSE SERPL-MCNC: 101 MG/DL (ref 65–99)
HCT VFR BLD AUTO: 34.4 % (ref 34–46.6)
HGB BLD-MCNC: 11.2 G/DL (ref 12–15.9)
IMM GRANULOCYTES # BLD AUTO: 0.03 10*3/MM3 (ref 0–0.05)
IMM GRANULOCYTES NFR BLD AUTO: 0.4 % (ref 0–0.5)
LEFT ATRIUM VOLUME INDEX: 29.3 ML/M2
LYMPHOCYTES # BLD AUTO: 1.06 10*3/MM3 (ref 0.7–3.1)
LYMPHOCYTES NFR BLD AUTO: 15.6 % (ref 19.6–45.3)
MAXIMAL PREDICTED HEART RATE: 128 BPM
MCH RBC QN AUTO: 28.1 PG (ref 26.6–33)
MCHC RBC AUTO-ENTMCNC: 32.6 G/DL (ref 31.5–35.7)
MCV RBC AUTO: 86.2 FL (ref 79–97)
MONOCYTES # BLD AUTO: 0.72 10*3/MM3 (ref 0.1–0.9)
MONOCYTES NFR BLD AUTO: 10.6 % (ref 5–12)
NEUTROPHILS NFR BLD AUTO: 4.75 10*3/MM3 (ref 1.7–7)
NEUTROPHILS NFR BLD AUTO: 70 % (ref 42.7–76)
NRBC BLD AUTO-RTO: 0 /100 WBC (ref 0–0.2)
PLATELET # BLD AUTO: 166 10*3/MM3 (ref 140–450)
PMV BLD AUTO: 11.2 FL (ref 6–12)
POTASSIUM SERPL-SCNC: 3.3 MMOL/L (ref 3.5–5.2)
RBC # BLD AUTO: 3.99 10*6/MM3 (ref 3.77–5.28)
SINUS: 2.7 CM
SODIUM SERPL-SCNC: 142 MMOL/L (ref 136–145)
STJ: 2.34 CM
STRESS TARGET HR: 109 BPM
TROPONIN T SERPL-MCNC: 0.02 NG/ML (ref 0–0.03)
WBC NRBC COR # BLD: 6.79 10*3/MM3 (ref 3.4–10.8)

## 2022-09-30 PROCEDURE — 80048 BASIC METABOLIC PNL TOTAL CA: CPT | Performed by: HOSPITALIST

## 2022-09-30 PROCEDURE — 93010 ELECTROCARDIOGRAM REPORT: CPT | Performed by: INTERNAL MEDICINE

## 2022-09-30 PROCEDURE — 93005 ELECTROCARDIOGRAM TRACING: CPT

## 2022-09-30 PROCEDURE — 99221 1ST HOSP IP/OBS SF/LOW 40: CPT | Performed by: INTERNAL MEDICINE

## 2022-09-30 PROCEDURE — 25010000002 FUROSEMIDE PER 20 MG: Performed by: HOSPITALIST

## 2022-09-30 PROCEDURE — G0378 HOSPITAL OBSERVATION PER HR: HCPCS

## 2022-09-30 PROCEDURE — 93306 TTE W/DOPPLER COMPLETE: CPT

## 2022-09-30 PROCEDURE — 93306 TTE W/DOPPLER COMPLETE: CPT | Performed by: INTERNAL MEDICINE

## 2022-09-30 PROCEDURE — 84484 ASSAY OF TROPONIN QUANT: CPT | Performed by: HOSPITALIST

## 2022-09-30 PROCEDURE — 85025 COMPLETE CBC W/AUTO DIFF WBC: CPT | Performed by: HOSPITALIST

## 2022-09-30 RX ORDER — POTASSIUM CHLORIDE 750 MG/1
10 TABLET, FILM COATED, EXTENDED RELEASE ORAL DAILY
Status: DISCONTINUED | OUTPATIENT
Start: 2022-09-30 | End: 2022-09-30

## 2022-09-30 RX ORDER — MELATONIN
1000 DAILY
Status: DISCONTINUED | OUTPATIENT
Start: 2022-09-30 | End: 2022-09-30

## 2022-09-30 RX ORDER — CHLORDIAZEPOXIDE HYDROCHLORIDE AND CLIDINIUM BROMIDE 5; 2.5 MG/1; MG/1
1 CAPSULE ORAL 2 TIMES DAILY
Status: DISCONTINUED | OUTPATIENT
Start: 2022-09-30 | End: 2022-10-06 | Stop reason: HOSPADM

## 2022-09-30 RX ORDER — AMLODIPINE BESYLATE 10 MG/1
10 TABLET ORAL DAILY
Status: DISCONTINUED | OUTPATIENT
Start: 2022-09-30 | End: 2022-10-05

## 2022-09-30 RX ORDER — POTASSIUM CHLORIDE 750 MG/1
20 TABLET, FILM COATED, EXTENDED RELEASE ORAL 2 TIMES DAILY WITH MEALS
Status: DISCONTINUED | OUTPATIENT
Start: 2022-09-30 | End: 2022-10-01

## 2022-09-30 RX ORDER — PANTOPRAZOLE SODIUM 40 MG/1
40 TABLET, DELAYED RELEASE ORAL ONCE
Status: DISCONTINUED | OUTPATIENT
Start: 2022-09-30 | End: 2022-09-30

## 2022-09-30 RX ORDER — L.ACID,PARA/B.BIFIDUM/S.THERM 8B CELL
1 CAPSULE ORAL DAILY
Status: DISCONTINUED | OUTPATIENT
Start: 2022-09-30 | End: 2022-10-06 | Stop reason: HOSPADM

## 2022-09-30 RX ORDER — PANTOPRAZOLE SODIUM 40 MG/1
40 TABLET, DELAYED RELEASE ORAL
Status: DISCONTINUED | OUTPATIENT
Start: 2022-09-30 | End: 2022-10-02

## 2022-09-30 RX ORDER — HYDROCODONE BITARTRATE AND ACETAMINOPHEN 5; 325 MG/1; MG/1
1 TABLET ORAL EVERY 8 HOURS PRN
Status: DISCONTINUED | OUTPATIENT
Start: 2022-09-30 | End: 2022-10-06

## 2022-09-30 RX ADMIN — AMLODIPINE BESYLATE 10 MG: 10 TABLET ORAL at 12:09

## 2022-09-30 RX ADMIN — POTASSIUM CHLORIDE 20 MEQ: 750 TABLET, EXTENDED RELEASE ORAL at 11:44

## 2022-09-30 RX ADMIN — PANTOPRAZOLE SODIUM 40 MG: 40 TABLET, DELAYED RELEASE ORAL at 07:33

## 2022-09-30 RX ADMIN — NITROGLYCERIN 1 INCH: 20 OINTMENT TOPICAL at 06:44

## 2022-09-30 RX ADMIN — CHLORDIAZEPOXIDE HYDROCHLORIDE AND CLIDINIUM BROMIDE 1 CAPSULE: 5; 2.5 CAPSULE ORAL at 12:09

## 2022-09-30 RX ADMIN — LEVOTHYROXINE SODIUM 75 MCG: 0.07 TABLET ORAL at 06:44

## 2022-09-30 RX ADMIN — POTASSIUM CHLORIDE 20 MEQ: 750 TABLET, EXTENDED RELEASE ORAL at 18:16

## 2022-09-30 RX ADMIN — ASPIRIN 81 MG: 81 TABLET, CHEWABLE ORAL at 11:44

## 2022-09-30 RX ADMIN — PANTOPRAZOLE SODIUM 40 MG: 40 TABLET, DELAYED RELEASE ORAL at 18:16

## 2022-09-30 RX ADMIN — NITROGLYCERIN 1 INCH: 20 OINTMENT TOPICAL at 12:14

## 2022-09-30 RX ADMIN — FUROSEMIDE 40 MG: 10 INJECTION, SOLUTION INTRAMUSCULAR; INTRAVENOUS at 18:16

## 2022-09-30 RX ADMIN — FUROSEMIDE 40 MG: 10 INJECTION, SOLUTION INTRAMUSCULAR; INTRAVENOUS at 11:44

## 2022-09-30 RX ADMIN — NITROGLYCERIN 1 INCH: 20 OINTMENT TOPICAL at 18:16

## 2022-10-01 LAB
ALBUMIN SERPL-MCNC: 4 G/DL (ref 3.5–5.2)
ALBUMIN/GLOB SERPL: 1.7 G/DL
ALP SERPL-CCNC: 87 U/L (ref 39–117)
ALT SERPL W P-5'-P-CCNC: 16 U/L (ref 1–33)
ANION GAP SERPL CALCULATED.3IONS-SCNC: 12.1 MMOL/L (ref 5–15)
AST SERPL-CCNC: 18 U/L (ref 1–32)
BASOPHILS # BLD AUTO: 0.03 10*3/MM3 (ref 0–0.2)
BASOPHILS NFR BLD AUTO: 0.4 % (ref 0–1.5)
BILIRUB SERPL-MCNC: 0.7 MG/DL (ref 0–1.2)
BILIRUB UR QL STRIP: NEGATIVE
BUN SERPL-MCNC: 41 MG/DL (ref 8–23)
BUN/CREAT SERPL: 26.8 (ref 7–25)
CALCIUM SPEC-SCNC: 10.2 MG/DL (ref 8.2–9.6)
CHLORIDE SERPL-SCNC: 98 MMOL/L (ref 98–107)
CHOLEST SERPL-MCNC: 140 MG/DL (ref 0–200)
CLARITY UR: CLEAR
CO2 SERPL-SCNC: 29.9 MMOL/L (ref 22–29)
COLOR UR: YELLOW
CREAT SERPL-MCNC: 1.53 MG/DL (ref 0.57–1)
CREAT UR-MCNC: 32.9 MG/DL
DEPRECATED RDW RBC AUTO: 45.1 FL (ref 37–54)
EGFRCR SERPLBLD CKD-EPI 2021: 31.8 ML/MIN/1.73
EOSINOPHIL # BLD AUTO: 0.14 10*3/MM3 (ref 0–0.4)
EOSINOPHIL NFR BLD AUTO: 1.7 % (ref 0.3–6.2)
ERYTHROCYTE [DISTWIDTH] IN BLOOD BY AUTOMATED COUNT: 14.3 % (ref 12.3–15.4)
GLOBULIN UR ELPH-MCNC: 2.4 GM/DL
GLUCOSE SERPL-MCNC: 117 MG/DL (ref 65–99)
GLUCOSE UR STRIP-MCNC: NEGATIVE MG/DL
HBA1C MFR BLD: 6 % (ref 4.8–5.6)
HCT VFR BLD AUTO: 37.9 % (ref 34–46.6)
HDLC SERPL-MCNC: 68 MG/DL (ref 40–60)
HGB BLD-MCNC: 12.2 G/DL (ref 12–15.9)
HGB UR QL STRIP.AUTO: NEGATIVE
IMM GRANULOCYTES # BLD AUTO: 0.03 10*3/MM3 (ref 0–0.05)
IMM GRANULOCYTES NFR BLD AUTO: 0.4 % (ref 0–0.5)
KETONES UR QL STRIP: NEGATIVE
LDLC SERPL CALC-MCNC: 61 MG/DL (ref 0–100)
LDLC/HDLC SERPL: 0.91 {RATIO}
LEUKOCYTE ESTERASE UR QL STRIP.AUTO: NEGATIVE
LYMPHOCYTES # BLD AUTO: 1.29 10*3/MM3 (ref 0.7–3.1)
LYMPHOCYTES NFR BLD AUTO: 15.8 % (ref 19.6–45.3)
MCH RBC QN AUTO: 27.8 PG (ref 26.6–33)
MCHC RBC AUTO-ENTMCNC: 32.2 G/DL (ref 31.5–35.7)
MCV RBC AUTO: 86.3 FL (ref 79–97)
MONOCYTES # BLD AUTO: 0.91 10*3/MM3 (ref 0.1–0.9)
MONOCYTES NFR BLD AUTO: 11.2 % (ref 5–12)
NEUTROPHILS NFR BLD AUTO: 5.75 10*3/MM3 (ref 1.7–7)
NEUTROPHILS NFR BLD AUTO: 70.5 % (ref 42.7–76)
NITRITE UR QL STRIP: NEGATIVE
NRBC BLD AUTO-RTO: 0 /100 WBC (ref 0–0.2)
PH UR STRIP.AUTO: 7 [PH] (ref 5–8)
PLATELET # BLD AUTO: 177 10*3/MM3 (ref 140–450)
PMV BLD AUTO: 11.1 FL (ref 6–12)
POTASSIUM SERPL-SCNC: 3.3 MMOL/L (ref 3.5–5.2)
PROT ?TM UR-MCNC: <4 MG/DL
PROT SERPL-MCNC: 6.4 G/DL (ref 6–8.5)
PROT UR QL STRIP: NEGATIVE
PROT/CREAT UR: NORMAL MG/G{CREAT}
PTH-INTACT SERPL-MCNC: 243 PG/ML (ref 15–65)
RBC # BLD AUTO: 4.39 10*6/MM3 (ref 3.77–5.28)
SODIUM SERPL-SCNC: 140 MMOL/L (ref 136–145)
SODIUM UR-SCNC: 107 MMOL/L
SP GR UR STRIP: 1.01 (ref 1–1.03)
TRIGL SERPL-MCNC: 51 MG/DL (ref 0–150)
TROPONIN T SERPL-MCNC: 0.03 NG/ML (ref 0–0.03)
TSH SERPL DL<=0.05 MIU/L-ACNC: 3.13 UIU/ML (ref 0.27–4.2)
UROBILINOGEN UR QL STRIP: NORMAL
VLDLC SERPL-MCNC: 11 MG/DL (ref 5–40)
WBC NRBC COR # BLD: 8.15 10*3/MM3 (ref 3.4–10.8)

## 2022-10-01 PROCEDURE — 93010 ELECTROCARDIOGRAM REPORT: CPT | Performed by: INTERNAL MEDICINE

## 2022-10-01 PROCEDURE — 80061 LIPID PANEL: CPT | Performed by: HOSPITALIST

## 2022-10-01 PROCEDURE — 81003 URINALYSIS AUTO W/O SCOPE: CPT | Performed by: INTERNAL MEDICINE

## 2022-10-01 PROCEDURE — 97530 THERAPEUTIC ACTIVITIES: CPT

## 2022-10-01 PROCEDURE — 93005 ELECTROCARDIOGRAM TRACING: CPT

## 2022-10-01 PROCEDURE — 83036 HEMOGLOBIN GLYCOSYLATED A1C: CPT | Performed by: HOSPITALIST

## 2022-10-01 PROCEDURE — 84300 ASSAY OF URINE SODIUM: CPT | Performed by: INTERNAL MEDICINE

## 2022-10-01 PROCEDURE — 84156 ASSAY OF PROTEIN URINE: CPT | Performed by: INTERNAL MEDICINE

## 2022-10-01 PROCEDURE — 99232 SBSQ HOSP IP/OBS MODERATE 35: CPT | Performed by: NURSE PRACTITIONER

## 2022-10-01 PROCEDURE — 83970 ASSAY OF PARATHORMONE: CPT | Performed by: INTERNAL MEDICINE

## 2022-10-01 PROCEDURE — 82570 ASSAY OF URINE CREATININE: CPT | Performed by: INTERNAL MEDICINE

## 2022-10-01 PROCEDURE — 84443 ASSAY THYROID STIM HORMONE: CPT | Performed by: HOSPITALIST

## 2022-10-01 PROCEDURE — G0378 HOSPITAL OBSERVATION PER HR: HCPCS

## 2022-10-01 PROCEDURE — 84484 ASSAY OF TROPONIN QUANT: CPT | Performed by: HOSPITALIST

## 2022-10-01 PROCEDURE — 80053 COMPREHEN METABOLIC PANEL: CPT | Performed by: HOSPITALIST

## 2022-10-01 PROCEDURE — 97162 PT EVAL MOD COMPLEX 30 MIN: CPT

## 2022-10-01 PROCEDURE — 25010000002 FUROSEMIDE PER 20 MG: Performed by: HOSPITALIST

## 2022-10-01 PROCEDURE — 85025 COMPLETE CBC W/AUTO DIFF WBC: CPT | Performed by: HOSPITALIST

## 2022-10-01 RX ORDER — ISOSORBIDE MONONITRATE 30 MG/1
30 TABLET, EXTENDED RELEASE ORAL
Status: DISCONTINUED | OUTPATIENT
Start: 2022-10-01 | End: 2022-10-05

## 2022-10-01 RX ORDER — LEVOTHYROXINE SODIUM 0.05 MG/1
50 TABLET ORAL
Status: DISCONTINUED | OUTPATIENT
Start: 2022-10-01 | End: 2022-10-06 | Stop reason: HOSPADM

## 2022-10-01 RX ORDER — POTASSIUM CHLORIDE 750 MG/1
20 TABLET, FILM COATED, EXTENDED RELEASE ORAL 2 TIMES DAILY WITH MEALS
Status: DISCONTINUED | OUTPATIENT
Start: 2022-10-01 | End: 2022-10-06 | Stop reason: HOSPADM

## 2022-10-01 RX ORDER — NITROGLYCERIN 0.4 MG/1
0.4 TABLET SUBLINGUAL
Status: DISCONTINUED | OUTPATIENT
Start: 2022-10-01 | End: 2022-10-05

## 2022-10-01 RX ORDER — POTASSIUM CHLORIDE 750 MG/1
20 TABLET, FILM COATED, EXTENDED RELEASE ORAL
Status: DISCONTINUED | OUTPATIENT
Start: 2022-10-01 | End: 2022-10-01

## 2022-10-01 RX ADMIN — ASPIRIN 81 MG: 81 TABLET, CHEWABLE ORAL at 09:45

## 2022-10-01 RX ADMIN — PANTOPRAZOLE SODIUM 40 MG: 40 TABLET, DELAYED RELEASE ORAL at 17:44

## 2022-10-01 RX ADMIN — AMLODIPINE BESYLATE 10 MG: 10 TABLET ORAL at 09:45

## 2022-10-01 RX ADMIN — Medication 1 CAPSULE: at 09:44

## 2022-10-01 RX ADMIN — CHLORDIAZEPOXIDE HYDROCHLORIDE AND CLIDINIUM BROMIDE 1 CAPSULE: 5; 2.5 CAPSULE ORAL at 09:44

## 2022-10-01 RX ADMIN — PANTOPRAZOLE SODIUM 40 MG: 40 TABLET, DELAYED RELEASE ORAL at 06:05

## 2022-10-01 RX ADMIN — NITROGLYCERIN 1 INCH: 20 OINTMENT TOPICAL at 13:57

## 2022-10-01 RX ADMIN — POTASSIUM CHLORIDE 20 MEQ: 750 TABLET, EXTENDED RELEASE ORAL at 17:44

## 2022-10-01 RX ADMIN — POTASSIUM CHLORIDE 20 MEQ: 750 TABLET, EXTENDED RELEASE ORAL at 09:45

## 2022-10-01 RX ADMIN — LEVOTHYROXINE SODIUM 50 MCG: 0.05 TABLET ORAL at 05:47

## 2022-10-01 RX ADMIN — FUROSEMIDE 40 MG: 10 INJECTION, SOLUTION INTRAMUSCULAR; INTRAVENOUS at 09:44

## 2022-10-01 RX ADMIN — ISOSORBIDE MONONITRATE 30 MG: 30 TABLET, EXTENDED RELEASE ORAL at 17:44

## 2022-10-01 RX ADMIN — NITROGLYCERIN 1 INCH: 20 OINTMENT TOPICAL at 05:42

## 2022-10-01 RX ADMIN — CHLORDIAZEPOXIDE HYDROCHLORIDE AND CLIDINIUM BROMIDE 1 CAPSULE: 5; 2.5 CAPSULE ORAL at 21:20

## 2022-10-01 NOTE — PLAN OF CARE
Problem: Adult Inpatient Plan of Care  Goal: Plan of Care Review  Recent Flowsheet Documentation  Taken 10/1/2022 1225 by Cheri James PT  Plan of Care Reviewed With: patient  Outcome Evaluation: 89yo F adm 9/29 with acute on chronic congestive heart failure. PMHx: HTN, OA, GERD, CKD, CAD. Pt lives alone in single level patio home however has caregiver assistance 6hrs/day who assist with IADLs/ADLs, otherwise pt is independent with mobility with 4ww. At this time pt requires up to min A with transfers and gait due to impaired balance and strength. Pt presents with baseline significant trunk/cervical flexion impacting safe mobility increasing risk for falls. Recommend continued acute skilled PT to increase overall strength, balance, endurance, safety and maximize independence with least restrictive AD. Due to lack of support at home and current mobility, recommend RADHA placement at this time.   Goal Outcome Evaluation:  Plan of Care Reviewed With: patient           Outcome Evaluation: 89yo F adm 9/29 with acute on chronic congestive heart failure. PMHx: HTN, OA, GERD, CKD, CAD. Pt lives alone in single level patio home however has caregiver assistance 6hrs/day who assist with IADLs/ADLs, otherwise pt is independent with mobility with 4ww. At this time pt requires up to min A with transfers and gait due to impaired balance and strength. Pt presents with baseline significant trunk/cervical flexion impacting safe mobility increasing risk for falls. Recommend continued acute skilled PT to increase overall strength, balance, endurance, safety and maximize independence with least restrictive AD. Due to lack of support at home and current mobility, recommend RADHA placement at this time.

## 2022-10-01 NOTE — THERAPY EVALUATION
Patient Name: Joycelyn Esqueda  : 1930    MRN: [id number]                              Today's Date: 10/1/2022       Admit Date: 2022    Visit Dx:     ICD-10-CM ICD-9-CM   1. Combined systolic and diastolic congestive heart failure, unspecified HF chronicity (HCC)  I50.40 428.40     Patient Active Problem List   Diagnosis   • CHF (congestive heart failure) (HCC)     Past Medical History:   Diagnosis Date   • Hypertension    • Thyroid disease      No past surgical history on file.   General Information     Row Name 10/01/22 1218          Physical Therapy Time and Intention    Document Type evaluation  -     Mode of Treatment individual therapy;physical therapy  -AG     Row Name 10/01/22 1218          General Information    Patient Profile Reviewed yes  -AG     Prior Level of Function min assist:;independent:;gait;transfer;dressing;bathing;ADL's;bed mobility;cooking;cleaning  assistance for ADLs and IADLs, independent for mobility with 4ww. has assistance 6hrs/day  -AG     Row Name 10/01/22 1218          Living Environment    People in Home alone  has assistance 6hrs/day  -AG     Row Name 10/01/22 1218          Home Main Entrance    Number of Stairs, Main Entrance none  -AG     Row Name 10/01/22 1218          Stairs Within Home, Primary    Number of Stairs, Within Home, Primary none  -AG     Row Name 10/01/22 1218          Cognition    Orientation Status (Cognition) oriented x 4  -AG     Row Name 10/01/22 1218          Safety Issues, Functional Mobility    Safety Issues Affecting Function (Mobility) problem-solving;positioning of assistive device  -     Impairments Affecting Function (Mobility) balance;endurance/activity tolerance;strength;postural/trunk control  -AG           User Key  (r) = Recorded By, (t) = Taken By, (c) = Cosigned By    Initials Name Provider Type    AG Cheri James PT Physical Therapist               Mobility     Row Name 10/01/22 1222          Bed Mobility    Bed Mobility  supine-sit  -AG     Supine-Sit [place] (Bed Mobility) supervision  -AG     Assistive Device (Bed Mobility) bed rails;head of bed elevated  -AG     Comment, (Bed Mobility) required extra time to complete  -AG     Row Name 10/01/22 1222          Bed-Chair Transfer    Bed-Chair [place] (Transfers) minimum assist (75% patient effort);verbal cues  -AG     Assistive Device (Bed-Chair Transfers) walker, 4-wheeled  -AG     Row Name 10/01/22 1222          Sit-Stand Transfer    Sit-Stand [place] (Transfers) minimum assist (75% patient effort);verbal cues  -AG     Assistive Device (Sit-Stand Transfers) walker, 4-wheeled  -AG     Comment, (Sit-Stand Transfer) upon standing had posterior lean requiring assistance to obtain COM over TONY  -AG     Row Name 10/01/22 1222          Gait/Stairs (Locomotion)    [place] Level (Gait) minimum assist (75% patient effort);contact guard  -AG     Assistive Device (Gait) walker, 4-wheeled  -AG     Distance in Feet (Gait) 20x2  -AG     Deviations/Abnormal Patterns (Gait) stride length decreased;angy decreased;gait speed decreased  -AG     Bilateral Gait Deviations forward flexed posture  -AG     Comment, (Gait/Stairs) pt does require extended time to ambulate distance, likely impaired gait speed at baseline  -AG           User Key  (r) = Recorded By, (t) = Taken By, (c) = Cosigned By    Initials Name Provider Type    AG Cheri James, PT Physical Therapist               Obj/Interventions     Row Name 10/01/22 1224          Range of Motion Comprehensive    General Range of Motion bilateral lower extremity ROM WFL  -AG     Row Name 10/01/22 1224          Strength Comprehensive (MMT)    Comment, General Manual Muscle Testing (MMT) Assessment BLE grossly 4-/5  -AG     Row Name 10/01/22 1224          Balance    Balance Assessment sit to stand dynamic balance;standing static balance;standing dynamic balance  -AG     Sit to Stand Dynamic Balance minimal assist;verbal cues  -AG      Static Standing Balance minimal assist  -AG     Dynamic Standing Balance minimal assist  -AG     Position/Device Used, Standing Balance supported;walker, 4-wheeled  -AG     Balance Interventions sit to stand;static;dynamic;standing;weight shifting activity;occupation based/functional task  -AG     Comment, Balance pt participated in functional balance activities to include toileting tasks and hand hygiene requiring CGA/min A for balance.  -AG           User Key  (r) = Recorded By, (t) = Taken By, (c) = Cosigned By    Initials Name Provider Type    Cheri Nesbitt, PT Physical Therapist               Goals/Plan     Row Name 10/01/22 1230          Bed Mobility Goal 1 (PT)    Activity/Assistive Device (Bed Mobility Goal 1, PT) bed mobility activities, all  -AG     [place] Level/Cues Needed (Bed Mobility Goal 1, PT) independent  -AG     Time Frame (Bed Mobility Goal 1, PT) 1 week  -AG     Row Name 10/01/22 1230          Transfer Goal 1 (PT)    Activity/Assistive Device (Transfer Goal 1, PT) transfers, all  least restrictive AD  -AG     [place] Level/Cues Needed (Transfer Goal 1, PT) independent  -AG     Time Frame (Transfer Goal 1, PT) 1 week  -AG     Row Name 10/01/22 1230          Gait Training Goal 1 (PT)    Activity/Assistive Device (Gait Training Goal 1, PT) gait (walking locomotion);assistive device use;other (see comments)  least restrictive AD  -AG     [place] Level (Gait Training Goal 1, PT) standby assist  -AG     Distance (Gait Training Goal 1, PT) 80ft  -AG     Time Frame (Gait Training Goal 1, PT) 1 week  -AG     Row Name 10/01/22 1230          Therapy Assessment/Plan (PT)    Planned Therapy Interventions (PT) balance training;bed mobility training;gait training;home exercise program;patient/family education;neuromuscular re-education;transfer training;strengthening  -AG           User Key  (r) = Recorded By, (t) = Taken By, (c) = Cosigned By    Initials Name Provider Type    XIMENA James  Cheri, PT Physical Therapist               Clinical Impression     Row Name 10/01/22 1225          Pain    Pretreatment Pain Rating 0/10 - no pain  -AG     Posttreatment Pain Rating 0/10 - no pain  -AG     Row Name 10/01/22 1225          Plan of Care Review    Plan of Care Reviewed With patient  -AG     Outcome Evaluation 89yo F adm 9/29 with acute on chronic congestive heart failure. PMHx: HTN, OA, GERD, CKD, CAD. Pt lives alone in single level patio home however has caregiver assistance 6hrs/day who assist with IADLs/ADLs, otherwise pt is independent with mobility with 4ww. At this time pt requires up to min A with transfers and gait due to impaired balance and strength. Pt presents with baseline significant trunk/cervical flexion impacting safe mobility increasing risk for falls. Recommend continued acute skilled PT to increase overall strength, balance, endurance, safety and maximize independence with least restrictive AD. Due to lack of support at home and current mobility, recommend RADHA placement at this time.  -AG     Row Name 10/01/22 1225          Therapy Assessment/Plan (PT)    Patient/Family Therapy Goals Statement (PT) --  -AG     Rehab Potential (PT) good, to achieve stated therapy goals  -AG     Criteria for Skilled Interventions Met (PT) yes  -AG     Therapy Frequency (PT) 5 times/wk  -AG     Row Name 10/01/22 1225          Vital Signs    Pretreatment Heart Rate (beats/min) 63  -AG     O2 Delivery Pre Treatment room air  -AG     O2 Delivery Intra Treatment room air  -AG     O2 Delivery Post Treatment room air  -AG     Pre Patient Position Supine  -AG     Post Patient Position Sitting  -AG     Row Name 10/01/22 1225          Positioning and Restraints    Pre-Treatment Position in bed  -AG     Post Treatment Position chair  -AG     In Chair notified nsg;exit alarm on;encouraged to call for assist;call light within reach  -AG           User Key  (r) = Recorded By, (t) = Taken By, (c) = Cosigned By     Initials Name Provider Type     Cheri James, NIRMALA Physical Therapist               Outcome Measures     Row Name 10/01/22 1231          How much help from another person do you currently need...    Turning from your back to your side while in flat bed without using bedrails? 3  -AG     Moving from lying on back to sitting on the side of a flat bed without bedrails? 3  -AG     Moving to and from a bed to a chair (including a wheelchair)? 3  -AG     Standing up from a chair using your arms (e.g., wheelchair, bedside chair)? 3  -AG     Climbing 3-5 steps with a railing? 2  -AG     To walk in hospital room? 3  -AG     AM-PAC 6 Clicks Score (PT) 17  -AG     Highest level of mobility 5 --> Static standing  -AG     Row Name 10/01/22 1231          Functional Assessment    Outcome Measure Options AM-PAC 6 Clicks Basic Mobility (PT)  -AG           User Key  (r) = Recorded By, (t) = Taken By, (c) = Cosigned By    Initials Name Provider Type    AG Cheri James, PT Physical Therapist                             Physical Therapy Education                 Title: PT OT SLP Therapies (Done)     Topic: Physical Therapy (Done)     Point: Mobility training (Done)     Learning Progress Summary           Patient Acceptance, E, VU by  at 10/1/2022 1231                   Point: Body mechanics (Done)     Learning Progress Summary           Patient Acceptance, E, VU by  at 10/1/2022 1231                   Point: Precautions (Done)     Learning Progress Summary           Patient Acceptance, E, VU by  at 10/1/2022 1231                               User Key     Initials Effective Dates Name Provider Type Discipline     01/27/22 -  Cheri James, NIRMALA Physical Therapist PT              PT Recommendation and Plan  Planned Therapy Interventions (PT): balance training, bed mobility training, gait training, home exercise program, patient/family education, neuromuscular re-education, transfer training, strengthening  Plan of Care  Reviewed With: patient  Outcome Evaluation: 89yo F adm 9/29 with acute on chronic congestive heart failure. PMHx: HTN, OA, GERD, CKD, CAD. Pt lives alone in single level patio home however has caregiver assistance 6hrs/day who assist with IADLs/ADLs, otherwise pt is independent with mobility with 4ww. At this time pt requires up to min A with transfers and gait due to impaired balance and strength. Pt presents with baseline significant trunk/cervical flexion impacting safe mobility increasing risk for falls. Recommend continued acute skilled PT to increase overall strength, balance, endurance, safety and maximize independence with least restrictive AD. Due to lack of support at home and current mobility, recommend RADHA placement at this time.     Time Calculation:    PT Charges     Row Name 10/01/22 1218             Time Calculation    Start Time 0931  -AG      Stop Time 1007  -AG      Time Calculation (min) 36 min  -AG      PT Received On 10/01/22  -AG      PT - Next Appointment 10/03/22  -AG      PT Goal Re-Cert Due Date 10/08/22  -AG              Time Calculation- PT    Total Timed Code Minutes- PT 26 minute(s)  -AG              Timed Charges    07509 - PT Therapeutic Activity Minutes 26  -AG              Untimed Charges    PT Eval/Re-eval Minutes 10  -AG              Total Minutes    Timed Charges Total Minutes 26  -AG      Untimed Charges Total Minutes 10  -AG       Total Minutes 36  -AG            User Key  (r) = Recorded By, (t) = Taken By, (c) = Cosigned By    Initials Name Provider Type    AG Cheri James, PT Physical Therapist              Therapy Charges for Today     Code Description Service Date Service Provider Modifiers Qty    30088975627 HC PT THERAPEUTIC ACT EA 15 MIN 10/1/2022 Cheri James, PT GP 2    82222299993 HC PT EVAL MOD COMPLEXITY 2 10/1/2022 Cheri James, PT GP 1          PT G-Codes  Outcome Measure Options: AM-PAC 6 Clicks Basic Mobility (PT)  AM-PAC 6 Clicks Score (PT):  17    Cheri James, PT  10/1/2022

## 2022-10-01 NOTE — PLAN OF CARE
"Goal Outcome Evaluation:  Plan of Care Reviewed With: patient, caregiver, family  Diuretic in Use: pt was receiving IV diuretics-on hold right now after am dose  Response to Diuretics (Output greater than intake): good output  Daily Weight (up or down): pt weight down by 6#  O2 Requirements: pt on room air-sats92-94%  Functional Status (Activity level, tolerance and respiratory symptoms): up in chair today and ambulated to BRP with walker and assist of 1  This RN discussed CHF and gave family a CHF booklet which she said she was going to read and discuss with pt  Also spoke to POA from [place] with update of pt condition after pt requested nurse to  Discharge Plans: pt planning on going home with assist from people who \"help her everyday\"              "

## 2022-10-01 NOTE — CONSULTS
Patient Care Team:  Giles Wheeler MD as PCP - General (Family Medicine)  Wily Handy MD as Referring Physician (Nephrology)    Chief complaint: SIENNA/CKD3b    Subjective     History of Present Illness  89yo presented to ER with increased dyspenea for about 1 week.  She had also noted increase LE edema.  She has reported h/o CKD 3b with baseline creatinine of 1.2-1.4.  She is followed with Dr. Handy in our group.  She is without any acute complaints at this time.  She had worsened creatinine to 1.5 with diuretics and we were asked to see her.    Review of Systems   Constitutional: Negative for chills and fever.   Respiratory: Negative for cough and shortness of breath.    Cardiovascular: Negative for chest pain.   Genitourinary: Negative for dysuria.   Musculoskeletal: Negative for joint swelling.   Skin: Negative for rash.   Neurological: Negative for headaches.        Past Medical History:   Diagnosis Date   • Hypertension    • Thyroid disease    , No past surgical history on file., No family history on file.,   Social History     Socioeconomic History   • Marital status:    Tobacco Use   • Smoking status: Never Smoker   Substance and Sexual Activity   • Alcohol use: No     E-cigarette/Vaping     E-cigarette/Vaping Substances     E-cigarette/Vaping Devices       ,   Medications Prior to Admission   Medication Sig Dispense Refill Last Dose   • Acetaminophen 325 MG capsule Take  by mouth.   9/28/2022 at Unknown time   • ascorbic acid (VITAMIN C) 1000 MG tablet Take 1,000 mg by mouth.   9/29/2022 at Unknown time   • carvedilol (COREG) 12.5 MG tablet Take 12.5 mg by mouth 2 (Two) Times a Day With Meals.   9/28/2022 at Unknown time   • clidinium-chlordiazePOXIDE (LIBRAX) 5-2.5 MG per capsule Take 1 capsule by mouth 2 (Two) Times a Day.   9/29/2022 at Unknown time   • cloNIDine (CATAPRES) 0.1 MG tablet Take 1 tablet by mouth 2 (Two) Times a Day.   9/29/2022 at Unknown time   • famotidine  (PEPCID) 20 MG tablet Take 20 mg by mouth.   9/29/2022 at Unknown time   • levothyroxine (SYNTHROID, LEVOTHROID) 50 MCG tablet TAKE 1 TABLET DAILY EXCEPT FOR MONDAY, WEDNESDAY, AND FRIDAY TAKE 2 TABLETS   9/28/2022 at Unknown time   • PROBIOTIC PRODUCT PO Take  by mouth.   9/28/2022 at Unknown time   • simethicone (MYLICON) 80 MG chewable tablet Chew Every 6 (Six) Hours As Needed.   Past Week at Unknown time   • amLODIPine (NORVASC) 5 MG tablet Take 1 tablet by mouth 2 (Two) Times a Day.   More than a month at Unknown time   • calcium carbonate (OS-TORREY) 1250 (500 Ca) MG chewable tablet Chew 1 tablet Daily.   More than a month at Unknown time   • cholecalciferol (VITAMIN D3) 25 MCG (1000 UT) tablet Take 1,000 Units by mouth Daily.   More than a month at Unknown time   • HYDROcodone-acetaminophen (NORCO) 5-325 MG per tablet Take 1 tablet by mouth Every 8 (Eight) Hours As Needed for Severe Pain (7-10). 24 tablet 0 More than a month at Unknown time   • levothyroxine (SYNTHROID, LEVOTHROID) 75 MCG tablet Take 75 mcg by mouth Daily.   More than a month at Unknown time   • losartan (COZAAR) 25 MG tablet Take 25 mg by mouth Daily.   More than a month at Unknown time   • mupirocin (BACTROBAN) 2 % ointment Apply  topically 2 (Two) Times a Day. To both feet 30 g 1 More than a month at Unknown time   • pantoprazole (PROTONIX) 20 MG EC tablet Take 20 mg by mouth Daily.   More than a month at Unknown time   • silver sulfadiazine (SILVADENE, SSD) 1 % cream Apply  topically 2 (Two) Times a Day. 50 g 0 More than a month at Unknown time   • UNKNOWN TO PATIENT Additional blood pressure medication      , Scheduled Meds:  amLODIPine, 10 mg, Oral, Daily  aspirin, 81 mg, Oral, Daily  clidinium-chlordiazePOXIDE, 1 capsule, Oral, BID  isosorbide mononitrate, 30 mg, Oral, Q24H  lactobacillus acidophilus, 1 capsule, Oral, Daily  levothyroxine, 50 mcg, Oral, Q AM  pantoprazole, 40 mg, Oral, BID AC  potassium chloride, 20 mEq, Oral, BID With  Meals    , Continuous Infusions:   , PRN Meds:  HYDROcodone-acetaminophen  •  nitroglycerin  •  [COMPLETED] Insert peripheral IV **AND** sodium chloride and Allergies:  Penicillins    Objective     Vital Signs  Temp:  [98 °F (36.7 °C)-98.4 °F (36.9 °C)] 98 °F (36.7 °C)  Heart Rate:  [62-67] 67  Resp:  [18] 18  BP: (114-137)/(61-70) 127/70    I/O this shift:  In: -   Out: 400 [Urine:400]  I/O last 3 completed shifts:  In: 360 [P.O.:360]  Out: 3300 [Urine:3300]    Physical Exam  Constitutional:       Comments: frail   HENT:      Head: Normocephalic.      Mouth/Throat:      Mouth: Mucous membranes are moist.   Eyes:      General: No scleral icterus.     Pupils: Pupils are equal, round, and reactive to light.   Cardiovascular:      Rate and Rhythm: Normal rate.      Heart sounds: Murmur heard.      Comments: 3/6 BLAKE  Pulmonary:      Effort: Pulmonary effort is normal.      Breath sounds: Normal breath sounds.   Abdominal:      General: Abdomen is flat.   Musculoskeletal:         General: Normal range of motion.      Right lower leg: No edema.      Left lower leg: No edema.   Skin:     General: Skin is warm and dry.   Neurological:      General: No focal deficit present.      Mental Status: She is alert.   Psychiatric:         Mood and Affect: Mood normal.         Results Review:    I reviewed the patient's new clinical results.    WBC WBC   Date Value Ref Range Status   10/01/2022 8.15 3.40 - 10.80 10*3/mm3 Final   09/30/2022 6.79 3.40 - 10.80 10*3/mm3 Final   09/29/2022 5.79 3.40 - 10.80 10*3/mm3 Final      HGB Hemoglobin   Date Value Ref Range Status   10/01/2022 12.2 12.0 - 15.9 g/dL Final   09/30/2022 11.2 (L) 12.0 - 15.9 g/dL Final   09/29/2022 11.2 (L) 12.0 - 15.9 g/dL Final      HCT Hematocrit   Date Value Ref Range Status   10/01/2022 37.9 34.0 - 46.6 % Final   09/30/2022 34.4 34.0 - 46.6 % Final   09/29/2022 35.9 34.0 - 46.6 % Final      Platlets No results found for: LABPLAT   MCV MCV   Date Value Ref Range  Status   10/01/2022 86.3 79.0 - 97.0 fL Final   09/30/2022 86.2 79.0 - 97.0 fL Final   09/29/2022 88.9 79.0 - 97.0 fL Final          Sodium Sodium   Date Value Ref Range Status   10/01/2022 140 136 - 145 mmol/L Final   09/30/2022 142 136 - 145 mmol/L Final   09/29/2022 139 136 - 145 mmol/L Final      Potassium Potassium   Date Value Ref Range Status   10/01/2022 3.3 (L) 3.5 - 5.2 mmol/L Final   09/30/2022 3.3 (L) 3.5 - 5.2 mmol/L Final   09/29/2022 4.5 3.5 - 5.2 mmol/L Final     Comment:     Slight hemolysis detected by analyzer. Results may be affected.      Chloride Chloride   Date Value Ref Range Status   10/01/2022 98 98 - 107 mmol/L Final   09/30/2022 101 98 - 107 mmol/L Final   09/29/2022 104 98 - 107 mmol/L Final      CO2 CO2   Date Value Ref Range Status   10/01/2022 29.9 (H) 22.0 - 29.0 mmol/L Final   09/30/2022 27.1 22.0 - 29.0 mmol/L Final   09/29/2022 22.9 22.0 - 29.0 mmol/L Final      BUN BUN   Date Value Ref Range Status   10/01/2022 41 (H) 8 - 23 mg/dL Final   09/30/2022 32 (H) 8 - 23 mg/dL Final   09/29/2022 31 (H) 8 - 23 mg/dL Final      Creatinine Creatinine   Date Value Ref Range Status   10/01/2022 1.53 (H) 0.57 - 1.00 mg/dL Final   09/30/2022 0.99 0.57 - 1.00 mg/dL Final   09/29/2022 1.23 (H) 0.57 - 1.00 mg/dL Final      Calcium Calcium   Date Value Ref Range Status   10/01/2022 10.2 (H) 8.2 - 9.6 mg/dL Final   09/30/2022 10.0 (H) 8.2 - 9.6 mg/dL Final   09/29/2022 10.0 (H) 8.2 - 9.6 mg/dL Final      PO4 No results found for: CAPO4   Albumin Albumin   Date Value Ref Range Status   10/01/2022 4.00 3.50 - 5.20 g/dL Final   09/29/2022 4.00 3.50 - 5.20 g/dL Final      Magnesium Magnesium   Date Value Ref Range Status   09/29/2022 2.4 (H) 1.7 - 2.3 mg/dL Final      Uric Acid No results found for: URICACID         Assessment & Plan       CHF (congestive heart failure) ([facility])      Assessment & Plan  SIENNA/CKD3b-  Reported baseline creatinine of 1.2-1.4.  Due to nephrosclerosis.  Her creatinine had  increased to 1.53 today.  Likely related to diuresis with AS as noted.  Agree with holding diuretics for now and monitor.  HTN-  Had recently been started on carvedilol.  Back on norvasc now, previously held due to concern for LE edema.  Will monitor.  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  Will check PTH now.  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  Normal EF.  Elevated BNP at 5135.      I discussed the patients findings and my recommendations with patient    Osmin Bennett MD  10/01/22  14:55 EDT

## 2022-10-01 NOTE — PLAN OF CARE
Goal Outcome Evaluation:  Plan of Care Reviewed With: patient        Progress: improving  Outcome Evaluation: Pt had Echo done yesterday and showed mild AV stenosis, and severe Aortic stenosis. Per MD will probaby want to treat conservertivly. Pt luigi talk it over with family and friends. Pt on lasix and feeling and breathing much better. CTM, safety maintained.  Diuretic in Use: Lasix  Response to Diuretics (Output greater than intake): output greater   Daily Weight (up or down):   O2 Requirements: cont on RA  Functional Status (Activity level, tolerance and respiratory symptoms): pt seems to be at baseline  Discharge Plans:  TBD

## 2022-10-01 NOTE — PROGRESS NOTES
Cardiology Follow-Up Note      Patient Name: Joycelyn Esqueda  Age/Sex: 89 y.o. female  : 1930  MRN: [id number]      Day of Service: 10/01/22       Chief Complaint/Follow-up: CHF/chest heaviness    Interval History: No acute events overnight, she is up in chair on room air, breathing much improved.       Temp:  [97.3 °F (36.3 °C)-98.4 °F (36.9 °C)] 98.1 °F (36.7 °C)  Heart Rate:  [58-66] 63  Resp:  [18] 18  BP: (114-137)/(54-63) 128/63     PHYSICAL EXAM:    General Appearance: No acute distress, elderly, frail female.    Eyes: Conjunctiva and lids: No erythema, swelling, or discharge. Sclera non-icteric.   HENT: Atraumatic, normocephalic. External eyes, ears, and nose normal.   Respiratory: No signs of respiratory distress. Respiration rhythm and depth normal.   Clear to auscultation. No rales, crackles, rhonchi, or wheezing auscultated.   Cardiovascular:  Heart Rate and Rhythm: Normal, Heart Sounds: NS1 and S2.  Systolic murmur noted.    Lower Extremities: No edema noted.  Gastrointestinal:  Abdomen soft, non-distended, non-tender.  Musculoskeletal: Moves all extremities, notably significantly kyphotic  Skin: Warm and dry.   Psychiatric: Patient alert and oriented to person, place, and time. Speech and behavior appropriate. Normal mood and affect.       ECG/TELE:           Results from last 7 days   Lab Units 10/01/22  0416 09/30/22  0418 09/29/22  1558   SODIUM mmol/L 140 142 139   POTASSIUM mmol/L 3.3* 3.3* 4.5   CHLORIDE mmol/L 98 101 104   CO2 mmol/L 29.9* 27.1 22.9   BUN mg/dL 41* 32* 31*   CREATININE mg/dL 1.53* 0.99 1.23*   GLUCOSE mg/dL 117* 101* 110*   CALCIUM mg/dL 10.2* 10.0* 10.0*     Results from last 7 days   Lab Units 10/01/22  0417 09/30/22  0418 09/29/22  1558   WBC 10*3/mm3 8.15 6.79 5.79   HEMOGLOBIN g/dL 12.2 11.2* 11.2*   HEMATOCRIT % 37.9 34.4 35.9   PLATELETS 10*3/mm3 177 166 160     Results from last 7 days   Lab Units 22  1558   INR  1.24*     Results from last 7  days   Lab Units 10/01/22  0416 09/30/22  0418 09/29/22  1904 09/29/22  1558   TROPONIN T ng/mL 0.025 0.021 0.012 0.010     Results from last 7 days   Lab Units 10/01/22  0416   TSH uIU/mL 3.130           Current Medications:   Scheduled Meds:amLODIPine, 10 mg, Oral, Daily  aspirin, 81 mg, Oral, Daily  clidinium-chlordiazePOXIDE, 1 capsule, Oral, BID  furosemide, 40 mg, Intravenous, BID  lactobacillus acidophilus, 1 capsule, Oral, Daily  levothyroxine, 50 mcg, Oral, Q AM  nitroglycerin, 1 inch, Topical, Q6H  pantoprazole, 40 mg, Oral, BID AC  potassium chloride, 20 mEq, Oral, BID With Meals      9/30/2022 Echo:    Interpretation Summary    · Estimated right ventricular systolic pressure from tricuspid regurgitation is normal (<35 mmHg).  · Peak velocity of the flow distal to the aortic valve is 427.7 cm/s. Aortic valve maximum pressure gradient is 73.2 mmHg. Aortic valve mean pressure gradient is 41.5 mmHg. Aortic valve dimensionless index is 0.2 .  · Severe aortic valve stenosis is present. Aortic valve area is 0.63 cm2.  · Calculated left ventricular EF = 65.8% Estimated left ventricular EF was in agreement with the calculated left ventricular EF.  · Left ventricular diastolic function is consistent with (grade Ia w/high LAP) impaired relaxation.  · The right atrial cavity is borderline dilated.  · There is a small (<1cm) pericardial effusion.           CHF (congestive heart failure) ([facility])       Plan:     Covering weekend for Dr. Mei:     --Acute heart failure/volume overload likely diastolic and valvular related given echo results showing severe AS. She has been on IV diuretics, responded well and looks fairly euvolemic today, creat jumped from 0.9 to 1.5. Will stop diuretics for now (she got am dose) and see what she looks like tomorrow.     --Severe AS by echo this admission, normal EF 65%, she is pretty frail, so not sure if indicated if she would be a candidate. Will defer to Dr. Mei.      --HTN, currently controlled. Losartan, cavedilol and clonidine are all on hold, will continue to hold for now.     --SIENNA on CKD, creat 1.5--she does have know CKD (follows w/Dr. Handy). Will see what renal function looks like tomorrow, may need to get them involved.    --Chest pain, no recurrence, trop negative. Will defer any further work up to Dr. Mei.      Bri Larsen, JOSÉ MIGUEL  10/01/22  10:11 EDT

## 2022-10-01 NOTE — PROGRESS NOTES
"Daily progress note    Primary care physician      Chief complaint  Doing better with no new complaints but still short of breath with exertion but no chest pain palpitation.  Patient seen and examined in presence of nursing staff.    History of present illness  89-year-old white female with history of hypertension hypothyroidism osteoarthritis and gastroesophageal disease presented to [facility] emergency room with shortness of breath for last 1 week with chest heaviness and leg swelling.  Patient denies any fever cough abdominal pain nausea vomiting diarrhea.  Patient never been to this hospital before and followed by [facility] geriatric service.  Patient followed commands and answer all question appropriately and no her medications reviewed and refusing to take Coreg and losartan which is her home medications.     REVIEW OF SYSTEMS  Per history and physical     PHYSICAL EXAM  Blood pressure 127/70, pulse 67, temperature 98 °F (36.7 °C), temperature source Oral, resp. rate 18, height 139.7 cm (55\"), weight 43.5 kg (95 lb 14.4 oz), SpO2 94 %.    GENERAL: Awake and alert, very pleasant nontoxic-appearing  HEENT:  Unremarkable  NECK:  Supple  CV: regular rhythm, regular rate  RESPIRATORY: normal effort, significant increase in work of breathing with minimal exertion with some rales in bases  ABDOMEN: soft nontender bowel sounds positive  MUSCULOSKELETAL: no deformity, trace pedal edema bilaterally   NEURO: alert, moves all extremities, follows commands  SKIN: warm, dry     LAB RESULTS  Lab Results (last 24 hours)     Procedure Component Value Units Date/Time    Comprehensive Metabolic Panel [[id number]]  (Abnormal) Collected: 10/01/22 0416    Specimen: Blood Updated: 10/01/22 0519     Glucose 117 mg/dL      BUN 41 mg/dL      Creatinine 1.53 mg/dL      Sodium 140 mmol/L      Potassium 3.3 mmol/L      Chloride 98 mmol/L      CO2 29.9 mmol/L      Calcium 10.2 mg/dL      Total Protein " 6.4 g/dL      Albumin 4.00 g/dL      ALT (SGPT) 16 U/L      AST (SGOT) 18 U/L      Alkaline Phosphatase 87 U/L      Total Bilirubin 0.7 mg/dL      Globulin 2.4 gm/dL      A/G Ratio 1.7 g/dL      BUN/Creatinine Ratio 26.8     Anion Gap 12.1 mmol/L      eGFR 31.8 mL/min/1.73      Comment: National Kidney Foundation and American Society of Nephrology (ASN) Task Force recommended calculation based on the Chronic Kidney Disease Epidemiology Collaboration (CKD-EPI) equation refit without adjustment for race.       Narrative:      GFR Normal >60  Chronic Kidney Disease <60  Kidney Failure <15      Troponin [[id number]]  (Normal) Collected: 10/01/22 0416    Specimen: Blood Updated: 10/01/22 0514     Troponin T 0.025 ng/mL     Narrative:      Troponin T Reference Range:  <= 0.03 ng/mL-   Negative for AMI  >0.03 ng/mL-     Abnormal for myocardial necrosis.  Clinicians would have to utilize clinical acumen, EKG, Troponin and serial changes to determine if it is an Acute Myocardial Infarction or myocardial injury due to an underlying chronic condition.       Results may be falsely decreased if patient taking Biotin.      TSH [[id number]]  (Normal) Collected: 10/01/22 0416    Specimen: Blood Updated: 10/01/22 0514     TSH 3.130 uIU/mL     Lipid Panel [[id number]]  (Abnormal) Collected: 10/01/22 0416    Specimen: Blood Updated: 10/01/22 0510     Total Cholesterol 140 mg/dL      Triglycerides 51 mg/dL      HDL Cholesterol 68 mg/dL      LDL Cholesterol  61 mg/dL      VLDL Cholesterol 11 mg/dL      LDL/HDL Ratio 0.91    Narrative:      Cholesterol Reference Ranges  (U.S. Department of Health and Human Services ATP III Classifications)    Desirable          <200 mg/dL  Borderline High    200-239 mg/dL  High Risk          >240 mg/dL      Triglyceride Reference Ranges  (U.S. Department of Health and Human Services ATP III Classifications)    Normal           <150 mg/dL  Borderline High  150-199 mg/dL  High             200-499 mg/dL  Very  High        >500 mg/dL    HDL Reference Ranges  (U.S. Department of Health and Human Services ATP III Classifications)    Low     <40 mg/dl (major risk factor for CHD)  High    >60 mg/dl ('negative' risk factor for CHD)        LDL Reference Ranges  (U.S. Department of Health and Human Services ATP III Classifications)    Optimal          <100 mg/dL  Near Optimal     100-129 mg/dL  Borderline High  130-159 mg/dL  High             160-189 mg/dL  Very High        >189 mg/dL    Hemoglobin A1c [[id number]]  (Abnormal) Collected: 10/01/22 0416    Specimen: Blood Updated: 10/01/22 0457     Hemoglobin A1C 6.00 %     Narrative:      Hemoglobin A1C Ranges:    Increased Risk for Diabetes  5.7% to 6.4%  Diabetes                     >= 6.5%  Diabetic Goal                < 7.0%    CBC & Differential [[id number]]  (Abnormal) Collected: 10/01/22 0417    Specimen: Blood Updated: 10/01/22 0448    Narrative:      The following orders were created for panel order CBC & Differential.  Procedure                               Abnormality         Status                     ---------                               -----------         ------                     CBC Auto Differential[[id number]]        Abnormal            Final result                 Please view results for these tests on the individual orders.    CBC Auto Differential [[id number]]  (Abnormal) Collected: 10/01/22 0417    Specimen: Blood Updated: 10/01/22 0448     WBC 8.15 10*3/mm3      RBC 4.39 10*6/mm3      Hemoglobin 12.2 g/dL      Hematocrit 37.9 %      MCV 86.3 fL      MCH 27.8 pg      MCHC 32.2 g/dL      RDW 14.3 %      RDW-SD 45.1 fl      MPV 11.1 fL      Platelets 177 10*3/mm3      Neutrophil % 70.5 %      Lymphocyte % 15.8 %      Monocyte % 11.2 %      Eosinophil % 1.7 %      Basophil % 0.4 %      Immature Grans % 0.4 %      Neutrophils, Absolute 5.75 10*3/mm3      Lymphocytes, Absolute 1.29 10*3/mm3      Monocytes, Absolute 0.91 10*3/mm3      Eosinophils, Absolute 0.14  10*3/mm3      Basophils, Absolute 0.03 10*3/mm3      Immature Grans, Absolute 0.03 10*3/mm3      nRBC 0.0 /100 WBC         Imaging Results (Last 24 Hours)     ** No results found for the last 24 hours. **        ECG 12 Lead  Component   Ref Range & Units 1 d ago    QT Interval   ms 524    Resulting Agency  ECG             HEART RATE= 57  bpm  RR Interval= 1053  ms  CT Interval=   ms  P Horizontal Axis=   deg  P Front Axis=   deg  QRSD Interval= 154  ms  QT Interval= 524  ms  QRS Axis= -57  deg  T Wave Axis= 102  deg  - ABNORMAL ECG -  Atrial flutter  LVH with IVCD, LAD and secondary repol abnrm  Probable inferior infarct, recent  Prolonged QT interval  No Prior Tracing for Comparison             Current Facility-Administered Medications:   •  amLODIPine (NORVASC) tablet 10 mg, 10 mg, Oral, Daily, Ji Wagner MD, 10 mg at 10/01/22 0945  •  aspirin chewable tablet 81 mg, 81 mg, Oral, Daily, Ji Wagner MD, 81 mg at 10/01/22 0945  •  clidinium-chlordiazePOXIDE (LIBRAX) 5-2.5 MG per capsule 1 capsule, 1 capsule, Oral, BID, Ji Wagner MD, 1 capsule at 10/01/22 0944  •  HYDROcodone-acetaminophen (NORCO) 5-325 MG per tablet 1 tablet, 1 tablet, Oral, Q8H PRN, Ji Wagner MD  •  lactobacillus acidophilus (RISAQUAD) capsule 1 capsule, 1 capsule, Oral, Daily, Ji Wagner MD, 1 capsule at 10/01/22 0944  •  levothyroxine (SYNTHROID, LEVOTHROID) tablet 50 mcg, 50 mcg, Oral, Q AM, Ji Wagner MD, 50 mcg at 10/01/22 0547  •  nitroglycerin (NITROSTAT) ointment 1 inch, 1 inch, Topical, Q6H, Ji Wagner MD, 1 inch at 10/01/22 1357  •  nitroglycerin (NITROSTAT) SL tablet 0.4 mg, 0.4 mg, Sublingual, Q5 Min PRN, Ji Wagner MD  •  pantoprazole (PROTONIX) EC tablet 40 mg, 40 mg, Oral, BID AC, Ji Wagner MD, 40 mg at 10/01/22 0605  •  potassium chloride (K-DUR,KLOR-CON) ER tablet 20 mEq, 20 mEq, Oral, BID With Meals, Ji Wagner MD, 20 mEq at 10/01/22 0945  •  [COMPLETED] Insert peripheral IV, , , Once **AND**  sodium chloride 0.9 % flush 10 mL, 10 mL, Intravenous, PRN, Osmin Johansen MD     ASSESSMENT  Acute on chronic congestive heart failure  Hypokalemia  Hypertension  Hypothyroidism  Osteoarthritis  Gastroesophageal reflux disease    PLAN  CPN  Hold diuretics as worsening BUN/creatinine  Replace potassium  Strict I's and O's and daily weight  Nephrology consult  Cardiology to follow patient  Adjust home medications  Stress ulcer DVT prophylaxis  Supportive care  PT/OT  Discussed with nursing staff  Follow closely further recommendation current hospital course    SOURAV DUTTON MD

## 2022-10-01 NOTE — CONSULTS
"Nutrition Services    Patient Name:  Joycelyn Esqueda  YOB: 1930  MRN: [id number]  Admit Date:  9/29/2022      Comment: Nutrition consult from nursing admission screen (reported decreased intake and unintentional weight loss. Pt here with A/CHF, Chest pain. Po intake has varied from 0-50% pf her meals. Looks like she has lost about 5-8lbs recently. Currently eating lunch, Offered boost, milkshakes, she declined. C/o some difficulty chewing but doesn't want her food modified. Encouraged po. Will cont to follow clinical course, nutrition needs.      CLINICAL NUTRITION ASSESSMENT      Reason for Assessment Nurse Admission Screen, Per Organizational Policy     H&P  Dx-CHF, Chest pain, HTN, hypothyroid    Past Medical History:   Diagnosis Date   • Hypertension    • Thyroid disease        No past surgical history on file.     Current Problem Decreased appetite     Encounter Information        Nutrition/Diet History:     Food Preferences:    Supplements:    Factors Affecting Intake: chewing difficulty, decreased appetite, Other:needs tray set up and meat cut     Anthropometrics        Current Height  Current Weight  BMI kg/m2 Height: 139.7 cm (55\")  Weight: 43.5 kg (95 lb 14.4 oz) (10/01/22 0647)  Body mass index is 22.29 kg/m².       Admission Weight 43.5kg   Ideal Body Weight (IBW) 42.4kg   Usual Body Weight (UBW) 103lb   Weight Change/Trend        Weight History Wt Readings from Last 30 Encounters:   10/01/22 0647 43.5 kg (95 lb 14.4 oz)   09/30/22 1447 45.8 kg (101 lb)   09/29/22 1601 45.8 kg (101 lb)   02/15/22 1258 46.1 kg (101 lb 11.2 oz)   01/25/22 1436 46.8 kg (103 lb 3.2 oz)   05/06/17 1411 56.7 kg (125 lb)                 Tests/Procedures        Tests/Procedures X-Ray     Labs       Pertinent Labs    Results from last 7 days   Lab Units 10/01/22  0416 09/30/22  0418 09/29/22  1558   SODIUM mmol/L 140 142 139   POTASSIUM mmol/L 3.3* 3.3* 4.5   CHLORIDE mmol/L 98 101 104   CO2 mmol/L 29.9* 27.1 " 22.9   BUN mg/dL 41* 32* 31*   CREATININE mg/dL 1.53* 0.99 1.23*   CALCIUM mg/dL 10.2* 10.0* 10.0*   BILIRUBIN mg/dL 0.7  --  0.7   ALK PHOS U/L 87  --  92   ALT (SGPT) U/L 16  --  19   AST (SGOT) U/L 18  --  27   GLUCOSE mg/dL 117* 101* 110*     Results from last 7 days   Lab Units 10/01/22  0417 10/01/22  0416 09/30/22  0418 09/29/22  1558   MAGNESIUM mg/dL  --   --   --  2.4*   HEMOGLOBIN g/dL 12.2  --    < > 11.2*   HEMATOCRIT % 37.9  --    < > 35.9   WBC 10*3/mm3 8.15  --    < > 5.79   TRIGLYCERIDES mg/dL  --  51  --   --     < > = values in this interval not displayed.     Results from last 7 days   Lab Units 10/01/22  0417 09/30/22  0418 09/29/22  1558   INR   --   --  1.24*   APTT seconds  --   --  33.4   PLATELETS 10*3/mm3 177 166 160     No results found for: COVID19  Lab Results   Component Value Date    HGBA1C 6.00 (H) 10/01/2022          Medications           Scheduled Medications amLODIPine, 10 mg, Oral, Daily  aspirin, 81 mg, Oral, Daily  clidinium-chlordiazePOXIDE, 1 capsule, Oral, BID  lactobacillus acidophilus, 1 capsule, Oral, Daily  levothyroxine, 50 mcg, Oral, Q AM  nitroglycerin, 1 inch, Topical, Q6H  pantoprazole, 40 mg, Oral, BID AC  potassium chloride, 20 mEq, Oral, BID With Meals       Infusions     PRN Medications HYDROcodone-acetaminophen  •  nitroglycerin  •  [COMPLETED] Insert peripheral IV **AND** sodium chloride     Physical Findings        Physical Appearance alert, frail, oriented, Other:kyphotic     NFPE Pending, Unable to perform due to:  pt eating lunch and wrapped up in blankets   --  Edema  2+ (mild)   Gastrointestinal normoactive   Tubes/Drains none   Oral/Mouth Cavity teeth missing   Skin skin intact   --  Current Nutrition Orders & Evaluation of Intake       Oral Nutrition     Food Allergies NKFA   Current PO Diet Diet Regular   Supplement n/a   PO Evaluation     Trending % PO Intake 0-50%       --  PES STATEMENT / NUTRITION DIAGNOSIS      Nutrition Dx Problem  Problem:  Inadequate Nutrient Intake  Etiology: Medical Diagnosis  Signs/Symptoms: Report of Minimal PO Intake    Comment:    --  NUTRITION INTERVENTION      Intervention Goal(s) Maintain nutrition status, Reduce/improve symptoms, Meet estimated needs, Disease management/therapy, Tolerate PO , Increase intake and Maintain weight         RD Intervention/Action Advise alternative selection, Advise available snack, Interview for preferences, Supplement offered/refused, Encourage intake, Follow Tx Progress and Care plan reviewed         Prescription/Orders:       PO Diet       Supplements       Enteral Nutrition       Parenteral Nutrition    New Prescription Ordered?    --      Monitor/Evaluation Per protocol, I&O, PO intake, Pertinent labs, Weight, Skin status, GI status, Swallow function   Education Will instruct as appropriate   --    RD to follow per protocol.    Electronically signed by:  Jojo Oneal RD  10/01/22 12:10 EDT

## 2022-10-02 PROBLEM — I50.40 COMBINED SYSTOLIC AND DIASTOLIC CONGESTIVE HEART FAILURE, UNSPECIFIED HF CHRONICITY: Status: ACTIVE | Noted: 2022-10-02

## 2022-10-02 LAB
ANION GAP SERPL CALCULATED.3IONS-SCNC: 14 MMOL/L (ref 5–15)
BASOPHILS # BLD AUTO: 0.03 10*3/MM3 (ref 0–0.2)
BASOPHILS NFR BLD AUTO: 0.4 % (ref 0–1.5)
BUN SERPL-MCNC: 50 MG/DL (ref 8–23)
BUN/CREAT SERPL: 26.6 (ref 7–25)
CALCIUM SPEC-SCNC: 10.1 MG/DL (ref 8.2–9.6)
CHLORIDE SERPL-SCNC: 97 MMOL/L (ref 98–107)
CO2 SERPL-SCNC: 31 MMOL/L (ref 22–29)
CREAT SERPL-MCNC: 1.88 MG/DL (ref 0.57–1)
DEPRECATED RDW RBC AUTO: 46.2 FL (ref 37–54)
EGFRCR SERPLBLD CKD-EPI 2021: 24.8 ML/MIN/1.73
EOSINOPHIL # BLD AUTO: 0.17 10*3/MM3 (ref 0–0.4)
EOSINOPHIL NFR BLD AUTO: 2.1 % (ref 0.3–6.2)
ERYTHROCYTE [DISTWIDTH] IN BLOOD BY AUTOMATED COUNT: 14.4 % (ref 12.3–15.4)
GLUCOSE SERPL-MCNC: 129 MG/DL (ref 65–99)
HCT VFR BLD AUTO: 38 % (ref 34–46.6)
HGB BLD-MCNC: 12.2 G/DL (ref 12–15.9)
IMM GRANULOCYTES # BLD AUTO: 0.03 10*3/MM3 (ref 0–0.05)
IMM GRANULOCYTES NFR BLD AUTO: 0.4 % (ref 0–0.5)
LYMPHOCYTES # BLD AUTO: 1.44 10*3/MM3 (ref 0.7–3.1)
LYMPHOCYTES NFR BLD AUTO: 17.7 % (ref 19.6–45.3)
MCH RBC QN AUTO: 28 PG (ref 26.6–33)
MCHC RBC AUTO-ENTMCNC: 32.1 G/DL (ref 31.5–35.7)
MCV RBC AUTO: 87.2 FL (ref 79–97)
MONOCYTES # BLD AUTO: 0.74 10*3/MM3 (ref 0.1–0.9)
MONOCYTES NFR BLD AUTO: 9.1 % (ref 5–12)
NEUTROPHILS NFR BLD AUTO: 5.74 10*3/MM3 (ref 1.7–7)
NEUTROPHILS NFR BLD AUTO: 70.3 % (ref 42.7–76)
NRBC BLD AUTO-RTO: 0 /100 WBC (ref 0–0.2)
PLATELET # BLD AUTO: 171 10*3/MM3 (ref 140–450)
PMV BLD AUTO: 11.2 FL (ref 6–12)
POTASSIUM SERPL-SCNC: 4 MMOL/L (ref 3.5–5.2)
RBC # BLD AUTO: 4.36 10*6/MM3 (ref 3.77–5.28)
SODIUM SERPL-SCNC: 142 MMOL/L (ref 136–145)
WBC NRBC COR # BLD: 8.15 10*3/MM3 (ref 3.4–10.8)

## 2022-10-02 PROCEDURE — 99232 SBSQ HOSP IP/OBS MODERATE 35: CPT | Performed by: NURSE PRACTITIONER

## 2022-10-02 PROCEDURE — 80048 BASIC METABOLIC PNL TOTAL CA: CPT | Performed by: NURSE PRACTITIONER

## 2022-10-02 PROCEDURE — 85025 COMPLETE CBC W/AUTO DIFF WBC: CPT | Performed by: HOSPITALIST

## 2022-10-02 RX ORDER — SODIUM CHLORIDE 9 MG/ML
100 INJECTION, SOLUTION INTRAVENOUS CONTINUOUS
Status: ACTIVE | OUTPATIENT
Start: 2022-10-02 | End: 2022-10-02

## 2022-10-02 RX ORDER — FAMOTIDINE 20 MG/1
20 TABLET, FILM COATED ORAL 2 TIMES DAILY
Status: DISCONTINUED | OUTPATIENT
Start: 2022-10-02 | End: 2022-10-03

## 2022-10-02 RX ADMIN — CHLORDIAZEPOXIDE HYDROCHLORIDE AND CLIDINIUM BROMIDE 1 CAPSULE: 5; 2.5 CAPSULE ORAL at 23:01

## 2022-10-02 RX ADMIN — CHLORDIAZEPOXIDE HYDROCHLORIDE AND CLIDINIUM BROMIDE 1 CAPSULE: 5; 2.5 CAPSULE ORAL at 08:12

## 2022-10-02 RX ADMIN — LEVOTHYROXINE SODIUM 50 MCG: 0.05 TABLET ORAL at 06:21

## 2022-10-02 RX ADMIN — ISOSORBIDE MONONITRATE 30 MG: 30 TABLET, EXTENDED RELEASE ORAL at 08:12

## 2022-10-02 RX ADMIN — PANTOPRAZOLE SODIUM 40 MG: 40 TABLET, DELAYED RELEASE ORAL at 06:20

## 2022-10-02 RX ADMIN — Medication 1 CAPSULE: at 08:12

## 2022-10-02 RX ADMIN — AMLODIPINE BESYLATE 10 MG: 10 TABLET ORAL at 08:12

## 2022-10-02 RX ADMIN — POTASSIUM CHLORIDE 20 MEQ: 750 TABLET, EXTENDED RELEASE ORAL at 08:12

## 2022-10-02 RX ADMIN — FAMOTIDINE 20 MG: 20 TABLET ORAL at 23:00

## 2022-10-02 RX ADMIN — SODIUM CHLORIDE 100 ML/HR: 9 INJECTION, SOLUTION INTRAVENOUS at 12:52

## 2022-10-02 RX ADMIN — ASPIRIN 81 MG: 81 TABLET, CHEWABLE ORAL at 08:12

## 2022-10-02 RX ADMIN — POTASSIUM CHLORIDE 20 MEQ: 750 TABLET, EXTENDED RELEASE ORAL at 18:29

## 2022-10-02 NOTE — PROGRESS NOTES
Electrophysiology Follow-Up Note      Patient Name: Joycelyn Esqueda  Age/Sex: 89 y.o. female  : 1930  MRN: [id number]      Day of Service: 10/02/22       Chief Complaint/Follow-up: CHF/chest heaviness    Interval History: No acute events overnight, denies chest pain or shortness of breath this morning      Temp:  [97 °F (36.1 °C)-98 °F (36.7 °C)] 97 °F (36.1 °C)  Heart Rate:  [67-72] 72  Resp:  [18] 18  BP: (127-140)/(70-73) 140/73     PHYSICAL EXAM:    General Appearance: No acute distress, frail elderly female  Eyes: Conjunctiva and lids: No erythema, swelling, or discharge. Sclera non-icteric.   HENT: Atraumatic, normocephalic. External eyes, ears, and nose normal.   Respiratory: No signs of respiratory distress. Respiration rhythm and depth normal.   Cardiovascular:  Heart Rate and Rhythm: Normal, Heart Sounds:  S1 and S2.   Murmur heard  Lower Extremities: No edema noted.  Gastrointestinal:  Abdomen soft, non-distended, non-tender.  Musculoskeletal: Moves all extremities, significant kyphosis noted.   Skin: Warm and dry.   Psychiatric: Patient alert and oriented to person, place, and time.       ECG/TELE:           Results from last 7 days   Lab Units 10/02/22  0424 10/01/22  0416 09/30/22  0418   SODIUM mmol/L 142 140 142   POTASSIUM mmol/L 4.0 3.3* 3.3*   CHLORIDE mmol/L 97* 98 101   CO2 mmol/L 31.0* 29.9* 27.1   BUN mg/dL 50* 41* 32*   CREATININE mg/dL 1.88* 1.53* 0.99   GLUCOSE mg/dL 129* 117* 101*   CALCIUM mg/dL 10.1* 10.2* 10.0*     Results from last 7 days   Lab Units 10/02/22  0424 10/01/22  0417 09/30/22  0418   WBC 10*3/mm3 8.15 8.15 6.79   HEMOGLOBIN g/dL 12.2 12.2 11.2*   HEMATOCRIT % 38.0 37.9 34.4   PLATELETS 10*3/mm3 171 177 166     Results from last 7 days   Lab Units 22  1558   INR  1.24*     Results from last 7 days   Lab Units 10/01/22  0416 22  0418 22  1904 22  1558   TROPONIN T ng/mL 0.025 0.021 0.012 0.010     Results from last 7 days   Lab  Units 10/01/22  0416   TSH uIU/mL 3.130           Current Medications:   Scheduled Meds:amLODIPine, 10 mg, Oral, Daily  aspirin, 81 mg, Oral, Daily  clidinium-chlordiazePOXIDE, 1 capsule, Oral, BID  isosorbide mononitrate, 30 mg, Oral, Q24H  lactobacillus acidophilus, 1 capsule, Oral, Daily  levothyroxine, 50 mcg, Oral, Q AM  pantoprazole, 40 mg, Oral, BID AC  potassium chloride, 20 mEq, Oral, BID With Meals            CHF (congestive heart failure) ([facility])       Plan:     Covering weekend for Dr. Mei, he will resume care tomorrow     --Acute heart failure/volume overload likely diastolic and valvular related given echo results showing severe AS. She has been on IV diuretics, responded well and looked fairly euvolemic yesterday, creat was up to 1.5---stopped diuretics and nephrology was consulted.     --Severe AS by echo this admission, normal EF 65%, she is pretty frail, so not sure if indicated if she would be a candidate. Will defer to Dr. Mei.      --HTN, currently controlled. Losartan, cavedilol and clonidine are all on hold, will continue to hold for now.      --SIENNA on CKD, creat 1.5 10/1, 1.8 today--she does have know CKD--nephrology following     --Chest pain, no recurrence, trop negative. Will defer any further work up to Dr. Mei.         Bri Larsen, JOSÉ MIGUEL  10/02/22  07:22 EDT

## 2022-10-02 NOTE — CASE MANAGEMENT/SOCIAL WORK
Continued Stay Note  [facility]     Patient Name: Joycelyn Esqueda  MRN: [id number]  Today's Date: 10/2/2022    Admit Date: 9/29/2022    Plan: Home with assistance and HH vs SNF   Discharge Plan     Row Name 10/02/22 1636       Plan    Plan Home with assistance and HH vs SNF    Patient/Family in Agreement with Plan yes    Plan Comments Received call from primary RN stating that pt has concerns about returning home and would like to discuss possible HH.  Call placed to pt who state she has assistance 6hours/day and is concerned about being alone at night time.  Discussed possible need for in home personal caregivers.  Also discussed HH for nursing and PT.  Per PT eval, pt is appropriate for SNF.  Discussed this with pt who state she would prefer to return home with caregivers. List of in home personal caregivers, HH agencies, and SNFs.  Pts friend on her way to hospital and pt plans to discuss further with her.   to follow up with pt tomorrow on decision and choices.  JIMMY Montes RN               Discharge Codes    No documentation.               Expected Discharge Date and Time     Expected Discharge Date Expected Discharge Time    Oct 2, 2022             Bria Montes, RN

## 2022-10-02 NOTE — PROGRESS NOTES
" LOS: 0 days   Patient Care Team:  Giles Wheeler MD as PCP - General (Family Medicine)  Wily Aburto MD as Referring Physician (Nephrology)    Chief Complaint: SIENNA/CKD    Subjective     History of Present Illness  Pt without any acute complaints  Breathing at baseline.  Tolerating some po.    Subjective:  Symptoms:  No shortness of breath, chest pain, chest pressure or anxiety.    Diet:  No nausea or vomiting.    Pain:  She reports no pain.        History taken from: patient    Objective     Vital Sign Min/Max for last 24 hours  Temp  Min: 97 °F (36.1 °C)  Max: 98 °F (36.7 °C)   BP  Min: 127/70  Max: 140/73   Pulse  Min: 66  Max: 72   Resp  Min: 18  Max: 18   SpO2  Min: 93 %  Max: 96 %   No data recorded   Weight  Min: 43.2 kg (95 lb 3.8 oz)  Max: 43.2 kg (95 lb 3.8 oz)     Flowsheet Rows    Flowsheet Row First Filed Value   Admission Height 144.8 cm (57\") Documented at 09/29/2022 1601   Admission Weight 45.8 kg (101 lb) Documented at 09/29/2022 1601          No intake/output data recorded.  I/O last 3 completed shifts:  In: 300 [P.O.:300]  Out: 2100 [Urine:2100]    Objective:  General Appearance:  Comfortable.    Vital signs: (most recent): Blood pressure 134/70, pulse 66, temperature 97.1 °F (36.2 °C), temperature source Oral, resp. rate 18, height 139.7 cm (55\"), weight 43.2 kg (95 lb 3.8 oz), SpO2 93 %.  Vital signs are normal.    Output: Producing urine.    HEENT: Normal HEENT exam.    Lungs:  Normal effort and normal respiratory rate.    Heart: Normal rate.  Regular rhythm.    Abdomen: Abdomen is soft.  Bowel sounds are normal.   There is no abdominal tenderness.     Extremities: Normal range of motion.  There is no dependent edema.    Pulses: Distal pulses are intact.    Neurological: Patient is alert and oriented to person, place and time.    Pupils:  Pupils are equal, round, and reactive to light.    Skin:  Warm and dry.              Results Review:     I reviewed the patient's new clinical " results.    WBC WBC   Date Value Ref Range Status   10/02/2022 8.15 3.40 - 10.80 10*3/mm3 Final   10/01/2022 8.15 3.40 - 10.80 10*3/mm3 Final   09/30/2022 6.79 3.40 - 10.80 10*3/mm3 Final   09/29/2022 5.79 3.40 - 10.80 10*3/mm3 Final      HGB Hemoglobin   Date Value Ref Range Status   10/02/2022 12.2 12.0 - 15.9 g/dL Final   10/01/2022 12.2 12.0 - 15.9 g/dL Final   09/30/2022 11.2 (L) 12.0 - 15.9 g/dL Final   09/29/2022 11.2 (L) 12.0 - 15.9 g/dL Final      HCT Hematocrit   Date Value Ref Range Status   10/02/2022 38.0 34.0 - 46.6 % Final   10/01/2022 37.9 34.0 - 46.6 % Final   09/30/2022 34.4 34.0 - 46.6 % Final   09/29/2022 35.9 34.0 - 46.6 % Final      Platlets No results found for: LABPLAT   MCV MCV   Date Value Ref Range Status   10/02/2022 87.2 79.0 - 97.0 fL Final   10/01/2022 86.3 79.0 - 97.0 fL Final   09/30/2022 86.2 79.0 - 97.0 fL Final   09/29/2022 88.9 79.0 - 97.0 fL Final          Sodium Sodium   Date Value Ref Range Status   10/02/2022 142 136 - 145 mmol/L Final   10/01/2022 140 136 - 145 mmol/L Final   09/30/2022 142 136 - 145 mmol/L Final   09/29/2022 139 136 - 145 mmol/L Final      Potassium Potassium   Date Value Ref Range Status   10/02/2022 4.0 3.5 - 5.2 mmol/L Final   10/01/2022 3.3 (L) 3.5 - 5.2 mmol/L Final   09/30/2022 3.3 (L) 3.5 - 5.2 mmol/L Final   09/29/2022 4.5 3.5 - 5.2 mmol/L Final     Comment:     Slight hemolysis detected by analyzer. Results may be affected.      Chloride Chloride   Date Value Ref Range Status   10/02/2022 97 (L) 98 - 107 mmol/L Final   10/01/2022 98 98 - 107 mmol/L Final   09/30/2022 101 98 - 107 mmol/L Final   09/29/2022 104 98 - 107 mmol/L Final      CO2 CO2   Date Value Ref Range Status   10/02/2022 31.0 (H) 22.0 - 29.0 mmol/L Final   10/01/2022 29.9 (H) 22.0 - 29.0 mmol/L Final   09/30/2022 27.1 22.0 - 29.0 mmol/L Final   09/29/2022 22.9 22.0 - 29.0 mmol/L Final      BUN BUN   Date Value Ref Range Status   10/02/2022 50 (H) 8 - 23 mg/dL Final   10/01/2022 41  (H) 8 - 23 mg/dL Final   09/30/2022 32 (H) 8 - 23 mg/dL Final   09/29/2022 31 (H) 8 - 23 mg/dL Final      Creatinine Creatinine   Date Value Ref Range Status   10/02/2022 1.88 (H) 0.57 - 1.00 mg/dL Final   10/01/2022 1.53 (H) 0.57 - 1.00 mg/dL Final   09/30/2022 0.99 0.57 - 1.00 mg/dL Final   09/29/2022 1.23 (H) 0.57 - 1.00 mg/dL Final      Calcium Calcium   Date Value Ref Range Status   10/02/2022 10.1 (H) 8.2 - 9.6 mg/dL Final   10/01/2022 10.2 (H) 8.2 - 9.6 mg/dL Final   09/30/2022 10.0 (H) 8.2 - 9.6 mg/dL Final   09/29/2022 10.0 (H) 8.2 - 9.6 mg/dL Final      PO4 No results found for: CAPO4   Albumin Albumin   Date Value Ref Range Status   10/01/2022 4.00 3.50 - 5.20 g/dL Final   09/29/2022 4.00 3.50 - 5.20 g/dL Final      Magnesium Magnesium   Date Value Ref Range Status   09/29/2022 2.4 (H) 1.7 - 2.3 mg/dL Final      Uric Acid No results found for: URICACID     Medication Review:   amLODIPine, 10 mg, Oral, Daily  aspirin, 81 mg, Oral, Daily  clidinium-chlordiazePOXIDE, 1 capsule, Oral, BID  isosorbide mononitrate, 30 mg, Oral, Q24H  lactobacillus acidophilus, 1 capsule, Oral, Daily  levothyroxine, 50 mcg, Oral, Q AM  pantoprazole, 40 mg, Oral, BID AC  potassium chloride, 20 mEq, Oral, BID With Meals          Assessment & Plan       CHF (congestive heart failure) (HCC)      Assessment & Plan  SIENNA/CKD3b-  Reported baseline creatinine of 1.2-1.4.  Due to nephrosclerosis.  Her creatinine had increased to 1.53->1.88 today.  Likely related to diuresis with AS as noted.  diuretics on hold.  Will give back some IVF's today with 500cc NS and monitor.  HTN-  Had recently been started on carvedilol.  Back on norvasc now, previously held due to concern for LE edema.  Will monitor.  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  PTH up at 243.  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  Normal EF.  Elevated  BNP at 5135.    Osmin Bennett MD  10/02/22  10:04 EDT

## 2022-10-02 NOTE — PLAN OF CARE
Goal Outcome Evaluation:               Diuretic in Use: Lasix given yesterday, then held due to increasing BUN and creatinine   Response to Diuretics (Output greater than intake): O>I  Daily Weight (up or down): down  O2 Requirements: none  Functional Status (Activity level, tolerance and respiratory symptoms): up to BR with walker, baseline  Discharge Plans: home with caregivers, TBD

## 2022-10-02 NOTE — PROGRESS NOTES
"Daily progress note    Primary care physician      Chief complaint  Doing better with no new complaints and making slow progress.  Patient family at bedside.  Patient denies any chest pain increase shortness of breath palpitation.    History of present illness  89-year-old white female with history of hypertension hypothyroidism osteoarthritis and gastroesophageal disease presented to [facility] emergency room with shortness of breath for last 1 week with chest heaviness and leg swelling.  Patient denies any fever cough abdominal pain nausea vomiting diarrhea.  Patient never been to this hospital before and followed by [facility] geriatric service.  Patient followed commands and answer all question appropriately and no her medications reviewed and refusing to take Coreg and losartan which is her home medications.     REVIEW OF SYSTEMS  Per history and physical     PHYSICAL EXAM  Blood pressure 136/65, pulse 71, temperature 98 °F (36.7 °C), temperature source Oral, resp. rate 20, height 139.7 cm (55\"), weight 43.2 kg (95 lb 3.8 oz), SpO2 95 %.    GENERAL: Awake and alert, very pleasant nontoxic-appearing  HEENT:  Unremarkable  NECK:  Supple  CV: regular rhythm, regular rate  RESPIRATORY: normal effort, significant increase in work of breathing with minimal exertion with some rales in bases  ABDOMEN: soft nontender bowel sounds positive  MUSCULOSKELETAL: no deformity, trace pedal edema bilaterally   NEURO: alert, moves all extremities, follows commands  SKIN: warm, dry     LAB RESULTS  Lab Results (last 24 hours)     Procedure Component Value Units Date/Time    Basic Metabolic Panel [[id number]]  (Abnormal) Collected: 10/02/22 0424    Specimen: Blood Updated: 10/02/22 0525     Glucose 129 mg/dL      BUN 50 mg/dL      Creatinine 1.88 mg/dL      Sodium 142 mmol/L      Potassium 4.0 mmol/L      Chloride 97 mmol/L      CO2 31.0 mmol/L      Calcium 10.1 mg/dL      BUN/Creatinine Ratio " 26.6     Anion Gap 14.0 mmol/L      eGFR 24.8 mL/min/1.73      Comment: National Kidney Foundation and American Society of Nephrology (ASN) Task Force recommended calculation based on the Chronic Kidney Disease Epidemiology Collaboration (CKD-EPI) equation refit without adjustment for race.       Narrative:      GFR Normal >60  Chronic Kidney Disease <60  Kidney Failure <15      CBC & Differential [[id number]]  (Abnormal) Collected: 10/02/22 0424    Specimen: Blood Updated: 10/02/22 0455    Narrative:      The following orders were created for panel order CBC & Differential.  Procedure                               Abnormality         Status                     ---------                               -----------         ------                     CBC Auto Differential[[id number]]        Abnormal            Final result                 Please view results for these tests on the individual orders.    CBC Auto Differential [[id number]]  (Abnormal) Collected: 10/02/22 0424    Specimen: Blood Updated: 10/02/22 0455     WBC 8.15 10*3/mm3      RBC 4.36 10*6/mm3      Hemoglobin 12.2 g/dL      Hematocrit 38.0 %      MCV 87.2 fL      MCH 28.0 pg      MCHC 32.1 g/dL      RDW 14.4 %      RDW-SD 46.2 fl      MPV 11.2 fL      Platelets 171 10*3/mm3      Neutrophil % 70.3 %      Lymphocyte % 17.7 %      Monocyte % 9.1 %      Eosinophil % 2.1 %      Basophil % 0.4 %      Immature Grans % 0.4 %      Neutrophils, Absolute 5.74 10*3/mm3      Lymphocytes, Absolute 1.44 10*3/mm3      Monocytes, Absolute 0.74 10*3/mm3      Eosinophils, Absolute 0.17 10*3/mm3      Basophils, Absolute 0.03 10*3/mm3      Immature Grans, Absolute 0.03 10*3/mm3      nRBC 0.0 /100 WBC     PTH, Intact [[id number]]  (Abnormal) Collected: 10/01/22 2020    Specimen: Blood Updated: 10/01/22 2059     PTH, Intact 243.0 pg/mL     Narrative:      Results may be falsely decreased if patient taking Biotin.      Protein / Creatinine Ratio, Urine - Urine, Clean Catch  [[id number]] Collected: 10/01/22 1958    Specimen: Urine, Clean Catch Updated: 10/01/22 2035     Protein/Creatinine Ratio, Urine --     Comment: Unable to calculate        Creatinine, Urine 32.9 mg/dL      Total Protein, Urine <4.0 mg/dL     Sodium, Urine, Random - Urine, Clean Catch [[id number]] Collected: 10/01/22 1958    Specimen: Urine, Clean Catch Updated: 10/01/22 2035     Sodium, Urine 107 mmol/L     Narrative:      Reference intervals for random urine have not been established.  Clinical usage is dependent upon physician's interpretation in combination with other laboratory tests.       Urinalysis With Microscopic If Indicated (No Culture) - Urine, Clean Catch [[id number]]  (Normal) Collected: 10/01/22 1958    Specimen: Urine, Clean Catch Updated: 10/01/22 2015     Color, UA Yellow     Appearance, UA Clear     pH, UA 7.0     Specific Gravity, UA 1.009     Glucose, UA Negative     Ketones, UA Negative     Bilirubin, UA Negative     Blood, UA Negative     Protein, UA Negative     Leuk Esterase, UA Negative     Nitrite, UA Negative     Urobilinogen, UA 0.2 E.U./dL    Narrative:      Urine microscopic not indicated.        Imaging Results (Last 24 Hours)     ** No results found for the last 24 hours. **        ECG 12 Lead  Component   Ref Range & Units 1 d ago    QT Interval   ms 524    Resulting Agency  ECG             HEART RATE= 57  bpm  RR Interval= 1053  ms  KS Interval=   ms  P Horizontal Axis=   deg  P Front Axis=   deg  QRSD Interval= 154  ms  QT Interval= 524  ms  QRS Axis= -57  deg  T Wave Axis= 102  deg  - ABNORMAL ECG -  Atrial flutter  LVH with IVCD, LAD and secondary repol abnrm  Probable inferior infarct, recent  Prolonged QT interval  No Prior Tracing for Comparison             Current Facility-Administered Medications:   •  amLODIPine (NORVASC) tablet 10 mg, 10 mg, Oral, Daily, Ji Wagner MD, 10 mg at 10/02/22 0812  •  aspirin chewable tablet 81 mg, 81 mg, Oral, Daily, Ji Wagner MD, 81 mg  at 10/02/22 0812  •  clidinium-chlordiazePOXIDE (LIBRAX) 5-2.5 MG per capsule 1 capsule, 1 capsule, Oral, BID, Sourav Wagner MD, 1 capsule at 10/02/22 0812  •  HYDROcodone-acetaminophen (NORCO) 5-325 MG per tablet 1 tablet, 1 tablet, Oral, Q8H PRN, Sourav Wagner MD  •  isosorbide mononitrate (IMDUR) 24 hr tablet 30 mg, 30 mg, Oral, Q24H, Sourav Wagner MD, 30 mg at 10/02/22 0812  •  lactobacillus acidophilus (RISAQUAD) capsule 1 capsule, 1 capsule, Oral, Daily, Sourav Wagner MD, 1 capsule at 10/02/22 0812  •  levothyroxine (SYNTHROID, LEVOTHROID) tablet 50 mcg, 50 mcg, Oral, Q AM, Sourav Wagner MD, 50 mcg at 10/02/22 0621  •  nitroglycerin (NITROSTAT) SL tablet 0.4 mg, 0.4 mg, Sublingual, Q5 Min PRN, Sourav Wagner MD  •  pantoprazole (PROTONIX) EC tablet 40 mg, 40 mg, Oral, BID AC, Sourav Wagner MD, 40 mg at 10/02/22 0620  •  potassium chloride (K-DUR,KLOR-CON) ER tablet 20 mEq, 20 mEq, Oral, BID With Meals, Osmin Bennett MD, 20 mEq at 10/02/22 0812  •  [COMPLETED] Insert peripheral IV, , , Once **AND** sodium chloride 0.9 % flush 10 mL, 10 mL, Intravenous, PRN, Osmin Johansen MD  •  sodium chloride 0.9 % infusion, 100 mL/hr, Intravenous, Continuous, Osmin Bennett MD, Last Rate: 100 mL/hr at 10/02/22 1510, 100 mL/hr at 10/02/22 1510     ASSESSMENT  Acute on chronic diastolic congestive heart failure  Acute kidney injury  Severe aortic stenosis  Hypokalemia resolved  Hypertension  Hypothyroidism  Osteoarthritis  Chronic kidney disease stage III  Gastroesophageal reflux disease    PLAN  CPM  IVF  Continue to hold diuretics   Replace potassium  Strict I's and O's and daily weight  Nephrology consult appreciated  Cardiology to follow patient  Adjust home medications  Stress ulcer DVT prophylaxis  Supportive care  PT/OT  Discussed with nursing staff and family  Follow closely and further recommendation current hospital course    SOURAV WAGNER MD

## 2022-10-02 NOTE — PLAN OF CARE
Goal Outcome Evaluation:  Plan of Care Reviewed With: patient        Progress: improving  Outcome Evaluation: Pt AOx4. Pt able to make needs known. Pt sba to bsc. Encouraged pt to drink fluids. Informed pt wy IVFs were ordered (kidney levels.) Pt concerned about going back home and not having help during the night. CCP met and spoke with pt to give her options available. This nurse spoke with POA and told her CCP would be notified about pt's concerns. VSS. Safety maintained.

## 2022-10-03 LAB
ANION GAP SERPL CALCULATED.3IONS-SCNC: 9.7 MMOL/L (ref 5–15)
BASOPHILS # BLD AUTO: 0.03 10*3/MM3 (ref 0–0.2)
BASOPHILS NFR BLD AUTO: 0.4 % (ref 0–1.5)
BUN SERPL-MCNC: 50 MG/DL (ref 8–23)
BUN/CREAT SERPL: 30.9 (ref 7–25)
CALCIUM SPEC-SCNC: 9.9 MG/DL (ref 8.2–9.6)
CHLORIDE SERPL-SCNC: 100 MMOL/L (ref 98–107)
CO2 SERPL-SCNC: 30.3 MMOL/L (ref 22–29)
CREAT SERPL-MCNC: 1.62 MG/DL (ref 0.57–1)
DEPRECATED RDW RBC AUTO: 45.7 FL (ref 37–54)
EGFRCR SERPLBLD CKD-EPI 2021: 29.7 ML/MIN/1.73
EOSINOPHIL # BLD AUTO: 0.26 10*3/MM3 (ref 0–0.4)
EOSINOPHIL NFR BLD AUTO: 3.2 % (ref 0.3–6.2)
ERYTHROCYTE [DISTWIDTH] IN BLOOD BY AUTOMATED COUNT: 14.1 % (ref 12.3–15.4)
GLUCOSE SERPL-MCNC: 116 MG/DL (ref 65–99)
HCT VFR BLD AUTO: 35.7 % (ref 34–46.6)
HGB BLD-MCNC: 11.4 G/DL (ref 12–15.9)
IMM GRANULOCYTES # BLD AUTO: 0.02 10*3/MM3 (ref 0–0.05)
IMM GRANULOCYTES NFR BLD AUTO: 0.2 % (ref 0–0.5)
LYMPHOCYTES # BLD AUTO: 1.14 10*3/MM3 (ref 0.7–3.1)
LYMPHOCYTES NFR BLD AUTO: 14.2 % (ref 19.6–45.3)
MCH RBC QN AUTO: 28 PG (ref 26.6–33)
MCHC RBC AUTO-ENTMCNC: 31.9 G/DL (ref 31.5–35.7)
MCV RBC AUTO: 87.7 FL (ref 79–97)
MONOCYTES # BLD AUTO: 0.82 10*3/MM3 (ref 0.1–0.9)
MONOCYTES NFR BLD AUTO: 10.2 % (ref 5–12)
NEUTROPHILS NFR BLD AUTO: 5.76 10*3/MM3 (ref 1.7–7)
NEUTROPHILS NFR BLD AUTO: 71.8 % (ref 42.7–76)
NRBC BLD AUTO-RTO: 0 /100 WBC (ref 0–0.2)
PLATELET # BLD AUTO: 162 10*3/MM3 (ref 140–450)
PMV BLD AUTO: 11.1 FL (ref 6–12)
POTASSIUM SERPL-SCNC: 4.4 MMOL/L (ref 3.5–5.2)
RBC # BLD AUTO: 4.07 10*6/MM3 (ref 3.77–5.28)
SODIUM SERPL-SCNC: 140 MMOL/L (ref 136–145)
WBC NRBC COR # BLD: 8.03 10*3/MM3 (ref 3.4–10.8)

## 2022-10-03 PROCEDURE — 97530 THERAPEUTIC ACTIVITIES: CPT

## 2022-10-03 PROCEDURE — 80048 BASIC METABOLIC PNL TOTAL CA: CPT | Performed by: NURSE PRACTITIONER

## 2022-10-03 PROCEDURE — 85025 COMPLETE CBC W/AUTO DIFF WBC: CPT | Performed by: HOSPITALIST

## 2022-10-03 PROCEDURE — 99232 SBSQ HOSP IP/OBS MODERATE 35: CPT | Performed by: NURSE PRACTITIONER

## 2022-10-03 RX ORDER — FAMOTIDINE 20 MG/1
20 TABLET, FILM COATED ORAL DAILY
Status: DISCONTINUED | OUTPATIENT
Start: 2022-10-03 | End: 2022-10-05

## 2022-10-03 RX ADMIN — Medication 1 CAPSULE: at 09:28

## 2022-10-03 RX ADMIN — CHLORDIAZEPOXIDE HYDROCHLORIDE AND CLIDINIUM BROMIDE 1 CAPSULE: 5; 2.5 CAPSULE ORAL at 20:56

## 2022-10-03 RX ADMIN — POTASSIUM CHLORIDE 20 MEQ: 750 TABLET, EXTENDED RELEASE ORAL at 17:55

## 2022-10-03 RX ADMIN — ISOSORBIDE MONONITRATE 30 MG: 30 TABLET, EXTENDED RELEASE ORAL at 09:28

## 2022-10-03 RX ADMIN — AMLODIPINE BESYLATE 10 MG: 10 TABLET ORAL at 09:28

## 2022-10-03 RX ADMIN — CHLORDIAZEPOXIDE HYDROCHLORIDE AND CLIDINIUM BROMIDE 1 CAPSULE: 5; 2.5 CAPSULE ORAL at 09:28

## 2022-10-03 RX ADMIN — LEVOTHYROXINE SODIUM 50 MCG: 0.05 TABLET ORAL at 06:51

## 2022-10-03 RX ADMIN — ASPIRIN 81 MG: 81 TABLET, CHEWABLE ORAL at 09:28

## 2022-10-03 RX ADMIN — FAMOTIDINE 20 MG: 20 TABLET ORAL at 09:28

## 2022-10-03 RX ADMIN — POTASSIUM CHLORIDE 20 MEQ: 750 TABLET, EXTENDED RELEASE ORAL at 09:28

## 2022-10-03 NOTE — PLAN OF CARE
Problem: Adult Inpatient Plan of Care  Goal: Plan of Care Review  10/3/2022 0756 by Kimberly Carlson, RN  Outcome: Ongoing, Progressing  Flowsheets (Taken 10/3/2022 0754)  Outcome Evaluation: VSS. O2 SAT STABLE ON RA. RESTED AT SHORT NTERVALS IN BETWEEN CARE. UNEVENTFUL NIGHT.   Goal Outcome Evaluation:         Diuretic in Use: HAVE BEEN ON HOLD  Response to Diuretics (Output greater than intake): YES  Daily Weight (up or down): DOWN  O2 Requirements: RA  Functional Status (Activity level, tolerance and respiratory symptoms): FATIGUE WHEN UP TO BSC.  Discharge Plans:  TBD     Outcome Evaluation: VSS. O2 SAT STABLE ON RA. RESTED AT SHORT NTERVALS IN BETWEEN CARE. UNEVENTFUL NIGHT.

## 2022-10-03 NOTE — PLAN OF CARE
Goal Outcome Evaluation:  Plan of Care Reviewed With: patient, caregiver, family  Diuretic in Use: lasix is on hold for now  Response to Diuretics (Output greater than intake): pt has had a good output  Daily Weight (up or down): pt is down 12 lbs from admission  O2 Requirements: on room air-sats >93%  Functional Status (Activity level, tolerance and respiratory symptoms): pt very weak-gets short of breath with any movement  Discharge Plans: anticipating discharge to nursing facility-awaiting precert  Safety maintained this shift

## 2022-10-03 NOTE — PROGRESS NOTES
Continued Stay Note  [facility]     Patient Name: Joycelyn Esqueda  MRN: [id number]  Today's Date: 10/3/2022    Admit Date: 9/29/2022    Plan: SNF   Discharge Plan     Row Name 10/03/22 1727       Plan    Plan SNF    Plan Comments [facility] of network with pt's [facility]. CCP will f/u for back-up choices.               Discharge Codes    No documentation.               Expected Discharge Date and Time     Expected Discharge Date Expected Discharge Time    Oct 5, 2022             Santa Corona RN

## 2022-10-03 NOTE — DISCHARGE PLACEMENT REQUEST
"Joycelyn Saunders (89 y.o. Female)             Date of Birth   08/13/1930    Social Security Number       Address   [address] [place] [address]    Home Phone   [phone]    MRN   [id number]       Baptism   None    Marital Status                               Admission Date   9/29/22    Admission Type   Emergency    Admitting Provider   Ji Wagner MD    Attending Provider   Ji Wagner MD    Department, Room/Bed   [address], [address]       Discharge Date       Discharge Disposition       Discharge Destination                               Attending Provider: Ji Wagner MD    Allergies: Penicillins    Isolation: None   Infection: None   Code Status: CPR   Advance Care Planning Activity    Ht: 139.7 cm (55\")   Wt: 40.4 kg (89 lb)    Admission Cmt: None   Principal Problem: None                Active Insurance as of 9/29/2022     Primary Coverage     Payor Plan Insurance Group Employer/Plan Group    AETNA MEDICARE REPLACEMENT AETNA MEDICARE REPLACEMENT [id number]     Payor Plan Address Payor Plan Phone Number Payor Plan Fax Number Effective Dates    [address] [phone]  1/1/2022 - None Entered    [facility] [id number]       Subscriber Name Subscriber Birth Date Member ID       Joycelyn Saunders 8/13/1930 [id number]           Secondary Coverage     Payor Plan Insurance Group Employer/Plan Group    [employer]HE[employer] BLUE CROSS ANTHEM BLUE CROSS BLUE SHIELD PPO [id number]     Payor Plan Address Payor Plan Phone Number Payor Plan Fax Number Effective Dates    [address] [phone]  1/1/2021 - None Entered    [facility] [address]       Subscriber Name Subscriber Birth Date Member ID       JOYCELYN SAUNDERS 8/13/1930 [id number]                 Emergency Contacts      (Rel.) Home Phone Work Phone Mobile Phone    Cayla Batista (Friend) [phone] -- [phone]    Ruddy(financial POA)Rochelle (Friend) -- -- [phone]              "

## 2022-10-03 NOTE — PLAN OF CARE
Goal Outcome Evaluation:              Outcome Evaluation: Pt presented w more B knee buckling while ambulating however pt was able to correct knee buckling w/o additional assistance from PT.  Min A from PT required due to balance deficits and steering of rollator.  Pt required increased time to complete bed mobility and ambulation.  Pt will benifit from skilled PT to address mobility deficits and increase safety and independence w functional mobiltiy.  PT is recommending SNU at this time as pt is unsafe to d/c home independently.

## 2022-10-03 NOTE — PROGRESS NOTES
Continued Stay Note  [facility]     Patient Name: Joycelyn Esqueda  MRN: [id number]  Today's Date: 10/3/2022    Admit Date: 9/29/2022    Plan: SNF   Discharge Plan     Row Name 10/03/22 1228       Plan    Plan SNF    Plan Comments F/u with patient and caregiver at the bedside, pt would like to dc to short-term rehab; first choice is [facility]. Epic referral sent to [facility] to initiate cert if able to accept; CCP will follow up. Caregiver will provide dc transportation.               Discharge Codes    No documentation.               Expected Discharge Date and Time     Expected Discharge Date Expected Discharge Time    Oct 5, 2022             Santa Corona RN

## 2022-10-03 NOTE — THERAPY TREATMENT NOTE
Patient Name: Joycelyn Esqueda  : 1930    MRN: [id number]                              Today's Date: 10/3/2022       Admit Date: 2022    Visit Dx:     ICD-10-CM ICD-9-CM   1. Combined systolic and diastolic congestive heart failure, unspecified HF chronicity (HCC)  I50.40 428.40     Patient Active Problem List   Diagnosis   • CHF (congestive heart failure) (HCC)   • Combined systolic and diastolic congestive heart failure, unspecified HF chronicity (HCC)     Past Medical History:   Diagnosis Date   • Hypertension    • Thyroid disease      No past surgical history on file.   General Information     Row Name 10/03/22 155          Physical Therapy Time and Intention    Document Type therapy note (daily note)  -MD     Mode of Treatment individual therapy;physical therapy  -MD     Row Name 10/03/22 155          Cognition    Orientation Status (Cognition) oriented to;person  -MD           User Key  (r) = Recorded By, (t) = Taken By, (c) = Cosigned By    Initials Name Provider Type    Tiara Damon, PT Physical Therapist               Mobility     Row Name 10/03/22 9503          Bed Mobility    Bed Mobility supine-sit;sit-supine  -MD     Supine-Sit [place] (Bed Mobility) supervision  -MD     Sit-Supine [place] (Bed Mobility) verbal cues;moderate assist (50% patient effort)  -MD     Assistive Device (Bed Mobility) bed rails;head of bed elevated  -MD     Row Name 10/03/22 3963          Sit-Stand Transfer    Sit-Stand Bevier (Transfers) verbal cues;standby assist  -MD     Assistive Device (Sit-Stand Transfers) walker, 4-wheeled  -MD     Row Name 10/03/22 1553          Gait/Stairs (Locomotion)    Bevier Level (Gait) minimum assist (75% patient effort)  -MD     Assistive Device (Gait) walker, 4-wheeled  -MD     Distance in Feet (Gait) 30'  -MD     Deviations/Abnormal Patterns (Gait) stride length decreased;angy decreased;gait speed decreased  -MD     Bilateral Gait Deviations forward  flexed posture  -MD           User Key  (r) = Recorded By, (t) = Taken By, (c) = Cosigned By    Initials Name Provider Type    Tiara Damon, PT Physical Therapist               Obj/Interventions    No documentation.                Goals/Plan    No documentation.                Clinical Impression     Row Name 10/03/22 1642          Pain    Pretreatment Pain Rating 0/10 - no pain  -MD     Row Name 10/03/22 1642          Plan of Care Review    Outcome Evaluation Pt presented w more B knee buckling while ambulating however pt was able to correct knee buckling w/o additional assistance from PT.  Min A from PT required due to balance deficits and steering of rollator.  Pt required increased time to complete bed mobility and ambulation.  Pt will benifit from skilled PT to address mobility deficits and increase safety and independence w functional mobiltiy.  PT is recommending SNU at this time as pt is unsafe to d/c home independently.  -MD     Row Name 10/03/22 1642          Positioning and Restraints    Pre-Treatment Position in bed  -MD     Post Treatment Position bed  -MD     In Bed supine;call light within reach;exit alarm on;with family/caregiver  -MD           User Key  (r) = Recorded By, (t) = Taken By, (c) = Cosigned By    Initials Name Provider Type    Tiara Damon, PT Physical Therapist               Outcome Measures     Row Name 10/03/22 3115          How much help from another person do you currently need...    Turning from your back to your side while in flat bed without using bedrails? 3  -MD     Moving from lying on back to sitting on the side of a flat bed without bedrails? 2  -MD     Moving to and from a bed to a chair (including a wheelchair)? 3  -MD     Standing up from a chair using your arms (e.g., wheelchair, bedside chair)? 3  -MD     Climbing 3-5 steps with a railing? 1  -MD     To walk in hospital room? 2  -MD     AM-PAC 6 Clicks Score (PT) 14  -MD     Highest level of mobility 4 --> Transferred  to chair/commode  -MD     Row Name 10/03/22 1645          Functional Assessment    Outcome Measure Options AM-PAC 6 Clicks Basic Mobility (PT)  -MD           User Key  (r) = Recorded By, (t) = Taken By, (c) = Cosigned By    Initials Name Provider Type    Tiara Damon, PT Physical Therapist                             Physical Therapy Education                 Title: PT OT SLP Therapies (Done)     Topic: Physical Therapy (Done)     Point: Mobility training (Done)     Learning Progress Summary           Patient Acceptance, E, VU by AG at 10/1/2022 1231                   Point: Body mechanics (Done)     Learning Progress Summary           Patient Acceptance, E, VU by AG at 10/1/2022 1231                   Point: Precautions (Done)     Learning Progress Summary           Patient Acceptance, E, VU by MD at 10/3/2022 1645    Acceptance, E, VU by MD at 10/3/2022 1554    Acceptance, E, VU by AG at 10/1/2022 1231                               User Key     Initials Effective Dates Name Provider Type Discipline    MD 06/16/21 -  Tiara Regalado, PT Physical Therapist PT     01/27/22 -  Cheri James, NIRMALA Physical Therapist PT              PT Recommendation and Plan     Outcome Evaluation: Pt presented w more B knee buckling while ambulating however pt was able to correct knee buckling w/o additional assistance from PT.  Min A from PT required due to balance deficits and steering of rollator.  Pt required increased time to complete bed mobility and ambulation.  Pt will benifit from skilled PT to address mobility deficits and increase safety and independence w functional mobiltiy.  PT is recommending SNU at this time as pt is unsafe to d/c home independently.     Time Calculation:    PT Charges     Row Name 10/03/22 0082             Time Calculation    Start Time 1552  -MD      Stop Time 1610  -MD      Time Calculation (min) 18 min  -MD      PT Received On 10/03/22  -MD      PT - Next Appointment 10/04/22  -MD            User Key   (r) = Recorded By, (t) = Taken By, (c) = Cosigned By    Initials Name Provider Type    Tiara Damon PT Physical Therapist              Therapy Charges for Today     Code Description Service Date Service Provider Modifiers Efra    [id number]  PT THERAPEUTIC ACT EA 15 MIN 10/3/2022 Tiara Regalado, PT GP 1    [id number]  PT THER SUPP EA 15 MIN 10/3/2022 Tiara Regalado PT GP 1      Patient was wearing a face mask during this therapy encounter. Therapist used appropriate personal protective equipment including mask and gloves.  Mask used was standard procedure mask. Appropriate PPE was worn during the entire therapy session. Hand hygiene was completed before and after therapy session. Patient is not in enhanced droplet precautions.         PT G-Codes  Outcome Measure Options: AM-PAC 6 Clicks Basic Mobility (PT)  AM-PAC 6 Clicks Score (PT): 14    Tiara Regalado PT  10/3/2022

## 2022-10-03 NOTE — PROGRESS NOTES
[facility]  Cardiology Progress Note    Patient Identification:  Name: Joycelyn Esqueda  Age: 89 y.o.  Sex: female  :  1930  MRN: [id number]                 Follow Up / Chief Complaint: Aortic stenosis/CHF/chest heaviness    Interval History:  No acute events noted overnight.  Sinus rhythm on the monitor.  She denies any chest pain or shortness of breath.             Objective:    Past Medical History:  Past Medical History:   Diagnosis Date   • Hypertension    • Thyroid disease      Past Surgical History:  No past surgical history on file.     Social History:   Social History     Tobacco Use   • Smoking status: Never Smoker   • Smokeless tobacco: Not on file   Substance Use Topics   • Alcohol use: No      Family History:  No family history on file.       Allergies:  Allergies   Allergen Reactions   • Penicillins      Scheduled Meds:  amLODIPine, 10 mg, Daily  aspirin, 81 mg, Daily  clidinium-chlordiazePOXIDE, 1 capsule, BID  famotidine, 20 mg, Daily  isosorbide mononitrate, 30 mg, Q24H  lactobacillus acidophilus, 1 capsule, Daily  levothyroxine, 50 mcg, Q AM  potassium chloride, 20 mEq, BID With Meals            INTAKE AND OUTPUT:    Intake/Output Summary (Last 24 hours) at 10/3/2022 1205  Last data filed at 10/3/2022 0645  Gross per 24 hour   Intake 200 ml   Output 350 ml   Net -150 ml       Review of Systems:    GI: Denies nausea and vomiting  Cardiac: Denies chest pain palpitation  Pulmonary: Denies shortness of breath    Constitutional:  Temp:  [97.3 °F (36.3 °C)-98 °F (36.7 °C)] 97.5 °F (36.4 °C)  Heart Rate:  [64-71] 64  Resp:  [17-20] 18  BP: (136-147)/(64-72) 139/64        Physical Exam: General:  Appears in no acute distress resting in bed  Eyes: eom normal no conjunctival drainage  HEENT:  No JVD. Thyroid not visibly enlarged. No mucosal pallor or cyanosis  Respiratory: Respirations regular and unlabored at rest. BBS with good air entry in all fields. No crackles, rubs or wheezes  auscultated  Cardiovascular: S1S2 Regular rate and rhythm. No murmur, rub or gallop. No carotid bruits. DP/PT pulses     . No pretibial pitting edema  Gastrointestinal: Abdomen soft, flat, non tender. Bowel sounds present. No hepatosplenomegaly. No ascites  Skin:   Skin warm and dry to touch. No rashes    Neuro: AAO x3 CN II-XII grossly intact  Psych: Mood and affect normal, pleasant and cooperative          I reviewed the patient's new clinical results, and personally reviewed and interpreted the patient's ECG and telemetry data from the last 24 hours      Cardiographics  Telemetry:   sr      ECG:     Echocardiogram:       Interpretation Summary    · Estimated right ventricular systolic pressure from tricuspid regurgitation is normal (<35 mmHg).  · Peak velocity of the flow distal to the aortic valve is 427.7 cm/s. Aortic valve maximum pressure gradient is 73.2 mmHg. Aortic valve mean pressure gradient is 41.5 mmHg. Aortic valve dimensionless index is 0.2 .  · Severe aortic valve stenosis is present. Aortic valve area is 0.63 cm2.  · Calculated left ventricular EF = 65.8% Estimated left ventricular EF was in agreement with the calculated left ventricular EF.  · Left ventricular diastolic function is consistent with (grade Ia w/high LAP) impaired relaxation.  · The right atrial cavity is borderline dilated.  · There is a small (<1cm) pericardial effusion.          Lab Review   Results from last 7 days   Lab Units 10/01/22  0416 09/30/22 0418 09/29/22  1904   TROPONIN T ng/mL 0.025 0.021 0.012     Results from last 7 days   Lab Units 09/29/22  1558   MAGNESIUM mg/dL 2.4*     Results from last 7 days   Lab Units 10/03/22  0402   SODIUM mmol/L 140   POTASSIUM mmol/L 4.4   BUN mg/dL 50*   CREATININE mg/dL 1.62*   CALCIUM mg/dL 9.9*        Results from last 7 days   Lab Units 10/03/22  0402 10/02/22  0424 10/01/22  0417   WBC 10*3/mm3 8.03 8.15 8.15   HEMOGLOBIN g/dL 11.4* 12.2 12.2   HEMATOCRIT % 35.7 38.0 37.9  "  PLATELETS 10*3/mm3 162 171 177     Results from last 7 days   Lab Units 09/29/22  1558   INR  1.24*   APTT seconds 33.4       Intake/Output Summary (Last 24 hours) at 10/3/2022 1210  Last data filed at 10/3/2022 0645  Gross per 24 hour   Intake 200 ml   Output 350 ml   Net -150 ml     Assessment:  Acute heart failure, volume overloaded: EF 65% with severe aortic stenosis.  Aortic stenosis: she declines any testing.  Hypertension: stable.  SIENNA on CKD: Nephrology following diuretics being held today per nephrology  Chest pain: Now resolved.    Plan:  Blood pressure and HR stable. She states she is doing ok. She denies chest pain and shortness of breath. At this time, she has refused any testing. Further recommendations once MD follows.             )10/3/2022  JOSÉ MIGUEL Corbett/Transcription:   \"Dictated utilizing Dragon dictation\".     "

## 2022-10-03 NOTE — PAYOR COMM NOTE
"Joycelyn Saunders (89 y.o. Fem    PLEASE SEE ATTACHED FOR INPT AUTH.    REF#[id number]    PLEASE CALL  OR  [phone]    THANK YOU    GLENIS HERMAN LPN CCP            Date of Birth   08/13/1930    Social Security Number       Address   [address] [place] [address]    Home Phone   [phone]    MRN   [id number]       Mu-ism   None    Marital Status                               Admission Date   9/29/22  OBS       10/2/22 INPT Admission Type   Emergency    Admitting Provider   Ji Wagner MD    Attending Provider   Ji Wagner MD    Department, Room/Bed   [address], N434/1       Discharge Date       Discharge Disposition       Discharge Destination                               Attending Provider: Ji Wagner MD    Allergies: Penicillins    Isolation: None   Infection: None   Code Status: CPR   Advance Care Planning Activity    Ht: 139.7 cm (55\")   Wt: 40.4 kg (89 lb)    Admission Cmt: None   Principal Problem: None                Active Insurance as of 9/29/2022     Primary Coverage     Payor Plan Insurance Group Employer/Plan Group    AETNA MEDICARE REPLACEMENT AETNA MEDICARE REPLACEMENT [id number]     Payor Plan Address Payor Plan Phone Number Payor Plan Fax Number Effective Dates    [address] [phone]  1/1/2022 - None Entered    [facility] [id number]       Subscriber Name Subscriber Birth Date Member ID       Joycelyn Saunders 8/13/1930 [id number]           Secondary Coverage     Payor Plan Insurance Group Employer/Plan Group    [employer]HEM BLUE CROSS ANTHEM BLUE CROSS BLUE SHIELD PPO [id number]     Payor Plan Address Payor Plan Phone Number Payor Plan Fax Number Effective Dates    [address] [phone]  1/1/2021 - None Entered    [facility] [id number]       Subscriber Name Subscriber Birth Date Member ID       JOYCELYN SAUNDERS 8/13/1930 [id number]                 Emergency Contacts      (Rel.) Home Phone " "Work Phone Mobile Phone    Cayla Batista (Friend) [phone] -- [phone]    Ruddy(financial POA)Rochelle (Friend) -- -- [phone]            [place]: [facility] [id number]  Tax ID [id number]     History & Physical      Ji Wagner MD at 09/30/22 0852          History and physical    Primary care physician      Chief complaint  Shortness of breath    History of present illness  89-year-old white female with history of hypertension hypothyroidism osteoarthritis and gastroesophageal disease presented to [facility] emergency room with shortness of breath for last 1 week with chest heaviness and leg swelling.  Patient denies any fever cough abdominal pain nausea vomiting diarrhea.  Patient never been to this hospital before and followed by [facility] geriatric service.  Patient followed commands and answer all question appropriately and no her medications reviewed and refusing to take Coreg and losartan which is her home medications.    PAST MEDICAL HISTORY  • Hypertension     • Hypothyroidism  Osteoarthritis  Gastroesophageal reflux disease        PAST SURGICAL HISTORY     No past surgical history on file.     FAMILY HISTORY  No family history on file.     SOCIAL HISTORY                Socioeconomic History   • Marital status:    Tobacco Use   • Smoking status: Never Smoker   Substance and Sexual Activity   • Alcohol use: No         ALLERGIES  Penicillins  Home medications reviewed     REVIEW OF SYSTEMS  Per history and physical     PHYSICAL EXAM  Blood pressure 151/67, pulse 54, temperature 97.9 °F (36.6 °C), temperature source Oral, resp. rate 17, height 144.8 cm (57\"), weight 45.8 kg (101 lb), SpO2 92 %.    GENERAL: Awake and alert, very pleasant nontoxic-appearing  HEENT:  Unremarkable  NECK:  Supple  CV: regular rhythm, regular rate  RESPIRATORY: normal effort, significant increase in work of breathing with minimal exertion with some rales in bases  ABDOMEN: " soft nontender bowel sounds positive  MUSCULOSKELETAL: no deformity, trace pedal edema bilaterally   NEURO: alert, moves all extremities, follows commands  SKIN: warm, dry     LAB RESULTS  Lab Results (last 24 hours)     Procedure Component Value Units Date/Time    Basic Metabolic Panel [[id number]]  (Abnormal) Collected: 09/30/22 0418    Specimen: Blood Updated: 09/30/22 0512     Glucose 101 mg/dL      BUN 32 mg/dL      Creatinine 0.99 mg/dL      Sodium 142 mmol/L      Potassium 3.3 mmol/L      Chloride 101 mmol/L      CO2 27.1 mmol/L      Calcium 10.0 mg/dL      BUN/Creatinine Ratio 32.3     Anion Gap 13.9 mmol/L      eGFR 53.6 mL/min/1.73      Comment: National Kidney Foundation and American Society of Nephrology (ASN) Task Force recommended calculation based on the Chronic Kidney Disease Epidemiology Collaboration (CKD-EPI) equation refit without adjustment for race.       Narrative:      GFR Normal >60  Chronic Kidney Disease <60  Kidney Failure <15      Troponin [[id number]]  (Normal) Collected: 09/30/22 0418    Specimen: Blood Updated: 09/30/22 0508     Troponin T 0.021 ng/mL     Narrative:      Troponin T Reference Range:  <= 0.03 ng/mL-   Negative for AMI  >0.03 ng/mL-     Abnormal for myocardial necrosis.  Clinicians would have to utilize clinical acumen, EKG, Troponin and serial changes to determine if it is an Acute Myocardial Infarction or myocardial injury due to an underlying chronic condition.       Results may be falsely decreased if patient taking Biotin.      CBC & Differential [[id number]]  (Abnormal) Collected: 09/30/22 0418    Specimen: Blood Updated: 09/30/22 0433    Narrative:      The following orders were created for panel order CBC & Differential.  Procedure                               Abnormality         Status                     ---------                               -----------         ------                     CBC Auto Differential[[id number]]        Abnormal            Final result                  Please view results for these tests on the individual orders.    CBC Auto Differential [[id number]]  (Abnormal) Collected: 09/30/22 0418    Specimen: Blood Updated: 09/30/22 0433     WBC 6.79 10*3/mm3      RBC 3.99 10*6/mm3      Hemoglobin 11.2 g/dL      Hematocrit 34.4 %      MCV 86.2 fL      MCH 28.1 pg      MCHC 32.6 g/dL      RDW 13.8 %      RDW-SD 43.1 fl      MPV 11.2 fL      Platelets 166 10*3/mm3      Neutrophil % 70.0 %      Lymphocyte % 15.6 %      Monocyte % 10.6 %      Eosinophil % 2.8 %      Basophil % 0.6 %      Immature Grans % 0.4 %      Neutrophils, Absolute 4.75 10*3/mm3      Lymphocytes, Absolute 1.06 10*3/mm3      Monocytes, Absolute 0.72 10*3/mm3      Eosinophils, Absolute 0.19 10*3/mm3      Basophils, Absolute 0.04 10*3/mm3      Immature Grans, Absolute 0.03 10*3/mm3      nRBC 0.0 /100 WBC     Troponin [[id number]]  (Normal) Collected: 09/29/22 1904    Specimen: Blood Updated: 09/29/22 1935     Troponin T 0.012 ng/mL     Narrative:      Troponin T Reference Range:  <= 0.03 ng/mL-   Negative for AMI  >0.03 ng/mL-     Abnormal for myocardial necrosis.  Clinicians would have to utilize clinical acumen, EKG, Troponin and serial changes to determine if it is an Acute Myocardial Infarction or myocardial injury due to an underlying chronic condition.       Results may be falsely decreased if patient taking Biotin.      Comprehensive Metabolic Panel [[id number]]  (Abnormal) Collected: 09/29/22 1558    Specimen: Blood Updated: 09/29/22 1654     Glucose 110 mg/dL      BUN 31 mg/dL      Creatinine 1.23 mg/dL      Sodium 139 mmol/L      Potassium 4.5 mmol/L      Comment: Slight hemolysis detected by analyzer. Results may be affected.        Chloride 104 mmol/L      CO2 22.9 mmol/L      Calcium 10.0 mg/dL      Total Protein 6.6 g/dL      Albumin 4.00 g/dL      ALT (SGPT) 19 U/L      AST (SGOT) 27 U/L      Alkaline Phosphatase 92 U/L      Total Bilirubin 0.7 mg/dL      Globulin 2.6 gm/dL      A/G  Ratio 1.5 g/dL      BUN/Creatinine Ratio 25.2     Anion Gap 12.1 mmol/L      eGFR 41.3 mL/min/1.73      Comment: National Kidney Foundation and American Society of Nephrology (ASN) Task Force recommended calculation based on the Chronic Kidney Disease Epidemiology Collaboration (CKD-EPI) equation refit without adjustment for race.       Narrative:      GFR Normal >60  Chronic Kidney Disease <60  Kidney Failure <15      Troponin [[id number]]  (Normal) Collected: 09/29/22 1558    Specimen: Blood Updated: 09/29/22 1654     Troponin T 0.010 ng/mL     Narrative:      Troponin T Reference Range:  <= 0.03 ng/mL-   Negative for AMI  >0.03 ng/mL-     Abnormal for myocardial necrosis.  Clinicians would have to utilize clinical acumen, EKG, Troponin and serial changes to determine if it is an Acute Myocardial Infarction or myocardial injury due to an underlying chronic condition.       Results may be falsely decreased if patient taking Biotin.      Magnesium [[id number]]  (Abnormal) Collected: 09/29/22 1558    Specimen: Blood Updated: 09/29/22 1654     Magnesium 2.4 mg/dL     BNP [[id number]]  (Abnormal) Collected: 09/29/22 1558    Specimen: Blood Updated: 09/29/22 1641     proBNP 5,135.0 pg/mL     Narrative:      Among patients with dyspnea, NT-proBNP is highly sensitive for the detection of acute congestive heart failure. In addition NT-proBNP of <300 pg/ml effectively rules out acute congestive heart failure with 99% negative predictive value.    Results may be falsely decreased if patient taking Biotin.      TSH [[id number]]  (Normal) Collected: 09/29/22 1558    Specimen: Blood Updated: 09/29/22 1641     TSH 4.130 uIU/mL     Protime-INR [[id number]]  (Abnormal) Collected: 09/29/22 1558    Specimen: Blood Updated: 09/29/22 1625     Protime 15.4 Seconds      INR 1.24    aPTT [[id number]]  (Normal) Collected: 09/29/22 1558    Specimen: Blood Updated: 09/29/22 1625     PTT 33.4 seconds     CBC & Differential [[id number]]  (Abnormal)  Collected: 09/29/22 1558    Specimen: Blood Updated: 09/29/22 1620    Narrative:      The following orders were created for panel order CBC & Differential.  Procedure                               Abnormality         Status                     ---------                               -----------         ------                     CBC Auto Differential[[id number]]        Abnormal            Final result                 Please view results for these tests on the individual orders.    CBC Auto Differential [[id number]]  (Abnormal) Collected: 09/29/22 1558    Specimen: Blood Updated: 09/29/22 1620     WBC 5.79 10*3/mm3      RBC 4.04 10*6/mm3      Hemoglobin 11.2 g/dL      Hematocrit 35.9 %      MCV 88.9 fL      MCH 27.7 pg      MCHC 31.2 g/dL      RDW 14.1 %      RDW-SD 45.5 fl      MPV 12.0 fL      Platelets 160 10*3/mm3      Neutrophil % 70.3 %      Lymphocyte % 16.8 %      Monocyte % 9.0 %      Eosinophil % 2.9 %      Basophil % 0.7 %      Immature Grans % 0.3 %      Neutrophils, Absolute 4.07 10*3/mm3      Lymphocytes, Absolute 0.97 10*3/mm3      Monocytes, Absolute 0.52 10*3/mm3      Eosinophils, Absolute 0.17 10*3/mm3      Basophils, Absolute 0.04 10*3/mm3      Immature Grans, Absolute 0.02 10*3/mm3      nRBC 0.0 /100 WBC         Imaging Results (Last 24 Hours)     Procedure Component Value Units Date/Time    XR Chest 1 View [[id number]] Collected: 09/29/22 1630     Updated: 09/29/22 1635    Narrative:      PORTABLE CHEST X-RAY     HISTORY: Shortness of breath.     TECHNIQUE: Portable chest x-ray is provided. No previous imaging for  correlation.     FINDINGS: The patient must be kyphotic and the drooping chin obscures  the upper mediastinum and visualized lung apices. The cardiac silhouette  is enlarged. There is pulmonary vascular engorgement with interstitial  prominence bilaterally which probably represents mild interstitial  edema. Additionally, there is blunting of left costophrenic angle which  may be a small  left pleural effusion. No pneumothorax.       Impression:      Cardiac enlargement with prominence of the pulmonary  interstitial favored represent pulmonary edema. There may also be a  small left pleural effusion.     This report was finalized on 9/29/2022 4:32 PM by Dr. Abilio Glover M.D.           ECG 12 Lead  Component   Ref Range & Units 1 d ago    QT Interval   ms 524    Resulting Agency  ECG             HEART RATE= 57  bpm  RR Interval= 1053  ms  NC Interval=   ms  P Horizontal Axis=   deg  P Front Axis=   deg  QRSD Interval= 154  ms  QT Interval= 524  ms  QRS Axis= -57  deg  T Wave Axis= 102  deg  - ABNORMAL ECG -  Atrial flutter  LVH with IVCD, LAD and secondary repol abnrm  Probable inferior infarct, recent  Prolonged QT interval  No Prior Tracing for Comparison             Current Facility-Administered Medications:   •  amLODIPine (NORVASC) tablet 10 mg, 10 mg, Oral, Daily, Ji Wagner MD  •  aspirin chewable tablet 81 mg, 81 mg, Oral, Daily, Ji Wagner MD  •  calcium-vitamin D 500-200 MG-UNIT tablet 1 tablet, 1 tablet, Oral, Daily, Ji Wagner MD  •  cholecalciferol (VITAMIN D3) tablet 1,000 Units, 1,000 Units, Oral, Daily, Ji Wagner MD  •  clidinium-chlordiazePOXIDE (LIBRAX) 5-2.5 MG per capsule 1 capsule, 1 capsule, Oral, BID, Ji Wagner MD  •  furosemide (LASIX) injection 40 mg, 40 mg, Intravenous, BID, Ji Wagner MD  •  HYDROcodone-acetaminophen (NORCO) 5-325 MG per tablet 1 tablet, 1 tablet, Oral, Q8H PRN, Ji Wagner MD  •  lactobacillus acidophilus (RISAQUAD) capsule 1 capsule, 1 capsule, Oral, Daily, Ji Wagner MD  •  levothyroxine (SYNTHROID, LEVOTHROID) tablet 75 mcg, 75 mcg, Oral, Q AM, Ji Wagner MD, 75 mcg at 09/30/22 0644  •  nitroglycerin (NITROSTAT) ointment 1 inch, 1 inch, Topical, Q6H, Ji Wagner MD, 1 inch at 09/30/22 0644  •  pantoprazole (PROTONIX) EC tablet 40 mg, 40 mg, Oral, Q AM, Ji Wagner MD, 40 mg at 09/30/22 1517  •  potassium chloride  (K-DUR,KLOR-CON) ER tablet 20 mEq, 20 mEq, Oral, BID With Meals, Sourav Dutton MD  •  [COMPLETED] Insert peripheral IV, , , Once **AND** sodium chloride 0.9 % flush 10 mL, 10 mL, Intravenous, PRN, Osmin Johansen MD     ASSESSMENT  Acute on chronic congestive heart failure  Chest pain rule out acute coronary syndrome  Hypertension  Hypothyroidism  Osteoarthritis  Gastroesophageal reflux disease    PLAN  Admit  IV diuresis  Serial cardiac enzymes and EKG  Strict I's and O's and daily weight  Check 2D echo  Aspirin nitroglycerin  Cardiology consult  Adjust home medications  Stress ulcer DVT prophylaxis  Supportive care  Patient is full code  Discussed with nursing staff  Follow closely further recommendation current hospital course    SOURAV DUTTON MD    Electronically signed by Sourav Dutton MD at 09/30/22 1100          Emergency Department Notes      Ting Parker RN at 09/29/22 1529        Pt arrived PV from home w/ daughter for SOB & CP. Pt states her BP was 182/73 @ home. Per pt it is a sharp intermittent pain in her midsternal chest. Pt unsure of when the pain started @ this time. This RN & LAURYN Smith used the  line due to pts primary language being [place]. Pt masked upon arrival to ER, this RN wore appropriate PPE during triage encounter.     Electronically signed by Ting Parker RN at 09/29/22 1640     Fritz Camp MD at 09/29/22 1601           EMERGENCY DEPARTMENT ENCOUNTER    Room Number:  N434/1  Date of encounter:  9/29/2022  PCP: Giles Wheeler MD  Historian: Patient      HPI:  Chief Complaint: Shortness of breath  A complete HPI/ROS/PMH/PSH/SH/FH are unobtainable due to: Poor historian    Context: Joycelyn Esqueda is a 89 y.o. female who presents to the ED c/o Hartness of breath which is moderate in severity and worsening over the last week or so.  It is particular bad with exertion.  Patient also has some chest heaviness at times and feels like her legs are  swollen.    Patient appears to have a history of chronic kidney disease and hypertension but no documented history of coronary artery disease or CHF that I can see.  She gets most of her care at [facility] geriatrics, and was seen there 2 weeks ago for blood pressure medicine adjustment per my review of his records in [facility].      PAST MEDICAL HISTORY  Active Ambulatory Problems     Diagnosis Date Noted   • No Active Ambulatory Problems     Resolved Ambulatory Problems     Diagnosis Date Noted   • No Resolved Ambulatory Problems     Past Medical History:   Diagnosis Date   • Hypertension    • Thyroid disease          PAST SURGICAL HISTORY  No past surgical history on file.      FAMILY HISTORY  No family history on file.      SOCIAL HISTORY  Social History     Socioeconomic History   • Marital status:    Tobacco Use   • Smoking status: Never Smoker   Substance and Sexual Activity   • Alcohol use: No         ALLERGIES  Penicillins        REVIEW OF SYSTEMS  Review of Systems   Limited due to poor historian      PHYSICAL EXAM    I have reviewed the triage vital signs and nursing notes.    ED Triage Vitals   Temp Heart Rate Resp BP SpO2   09/29/22 1541 09/29/22 1540 09/29/22 1540 09/29/22 1541 09/29/22 1540   98.2 °F (36.8 °C) 55 18 178/77 94 %      Temp src Heart Rate Source Patient Position BP Location FiO2 (%)   09/29/22 1541 09/29/22 1540 09/29/22 1541 09/29/22 1541 --   Tympanic Monitor Lying Left arm        Physical Exam  GENERAL: Awake and alert, very pleasant nontoxic-appearing  HENT: nares patent, severely kyphotic cervical and upper thoracic spine  EYES: no scleral icterus  CV: regular rhythm, regular rate  RESPIRATORY: normal effort, significant increase in work of breathing with minimal exertion with some rales in bases  ABDOMEN: soft  MUSCULOSKELETAL: no deformity, trace pedal edema bilaterally NEURO: alert, moves all extremities, follows commands  SKIN: warm, dry        LAB RESULTS  Recent Results (from  the past 24 hour(s))   ECG 12 Lead    Collection Time: 09/29/22  3:38 PM   Result Value Ref Range    QT Interval 524 ms   Comprehensive Metabolic Panel    Collection Time: 09/29/22  3:58 PM    Specimen: Blood   Result Value Ref Range    Glucose 110 (H) 65 - 99 mg/dL    BUN 31 (H) 8 - 23 mg/dL    Creatinine 1.23 (H) 0.57 - 1.00 mg/dL    Sodium 139 136 - 145 mmol/L    Potassium 4.5 3.5 - 5.2 mmol/L    Chloride 104 98 - 107 mmol/L    CO2 22.9 22.0 - 29.0 mmol/L    Calcium 10.0 (H) 8.2 - 9.6 mg/dL    Total Protein 6.6 6.0 - 8.5 g/dL    Albumin 4.00 3.50 - 5.20 g/dL    ALT (SGPT) 19 1 - 33 U/L    AST (SGOT) 27 1 - 32 U/L    Alkaline Phosphatase 92 39 - 117 U/L    Total Bilirubin 0.7 0.0 - 1.2 mg/dL    Globulin 2.6 gm/dL    A/G Ratio 1.5 g/dL    BUN/Creatinine Ratio 25.2 (H) 7.0 - 25.0    Anion Gap 12.1 5.0 - 15.0 mmol/L    eGFR 41.3 (L) >60.0 mL/min/1.73   Protime-INR    Collection Time: 09/29/22  3:58 PM    Specimen: Blood   Result Value Ref Range    Protime 15.4 (H) 11.7 - 14.2 Seconds    INR 1.24 (H) 0.90 - 1.10   aPTT    Collection Time: 09/29/22  3:58 PM    Specimen: Blood   Result Value Ref Range    PTT 33.4 22.7 - 35.4 seconds   BNP    Collection Time: 09/29/22  3:58 PM    Specimen: Blood   Result Value Ref Range    proBNP 5,135.0 (H) 0.0 - 1,800.0 pg/mL   Troponin    Collection Time: 09/29/22  3:58 PM    Specimen: Blood   Result Value Ref Range    Troponin T 0.010 0.000 - 0.030 ng/mL   TSH    Collection Time: 09/29/22  3:58 PM    Specimen: Blood   Result Value Ref Range    TSH 4.130 0.270 - 4.200 uIU/mL   Magnesium    Collection Time: 09/29/22  3:58 PM    Specimen: Blood   Result Value Ref Range    Magnesium 2.4 (H) 1.7 - 2.3 mg/dL   CBC Auto Differential    Collection Time: 09/29/22  3:58 PM    Specimen: Blood   Result Value Ref Range    WBC 5.79 3.40 - 10.80 10*3/mm3    RBC 4.04 3.77 - 5.28 10*6/mm3    Hemoglobin 11.2 (L) 12.0 - 15.9 g/dL    Hematocrit 35.9 34.0 - 46.6 %    MCV 88.9 79.0 - 97.0 fL    MCH 27.7  26.6 - 33.0 pg    MCHC 31.2 (L) 31.5 - 35.7 g/dL    RDW 14.1 12.3 - 15.4 %    RDW-SD 45.5 37.0 - 54.0 fl    MPV 12.0 6.0 - 12.0 fL    Platelets 160 140 - 450 10*3/mm3    Neutrophil % 70.3 42.7 - 76.0 %    Lymphocyte % 16.8 (L) 19.6 - 45.3 %    Monocyte % 9.0 5.0 - 12.0 %    Eosinophil % 2.9 0.3 - 6.2 %    Basophil % 0.7 0.0 - 1.5 %    Immature Grans % 0.3 0.0 - 0.5 %    Neutrophils, Absolute 4.07 1.70 - 7.00 10*3/mm3    Lymphocytes, Absolute 0.97 0.70 - 3.10 10*3/mm3    Monocytes, Absolute 0.52 0.10 - 0.90 10*3/mm3    Eosinophils, Absolute 0.17 0.00 - 0.40 10*3/mm3    Basophils, Absolute 0.04 0.00 - 0.20 10*3/mm3    Immature Grans, Absolute 0.02 0.00 - 0.05 10*3/mm3    nRBC 0.0 0.0 - 0.2 /100 WBC   Troponin    Collection Time: 09/29/22  7:04 PM    Specimen: Blood   Result Value Ref Range    Troponin T 0.012 0.000 - 0.030 ng/mL       Ordered the above labs and independently reviewed the results.        RADIOLOGY  XR Chest 1 View    Result Date: 9/29/2022  PORTABLE CHEST X-RAY  HISTORY: Shortness of breath.  TECHNIQUE: Portable chest x-ray is provided. No previous imaging for correlation.  FINDINGS: The patient must be kyphotic and the drooping chin obscures the upper mediastinum and visualized lung apices. The cardiac silhouette is enlarged. There is pulmonary vascular engorgement with interstitial prominence bilaterally which probably represents mild interstitial edema. Additionally, there is blunting of left costophrenic angle which may be a small left pleural effusion. No pneumothorax.      Cardiac enlargement with prominence of the pulmonary interstitial favored represent pulmonary edema. There may also be a small left pleural effusion.  This report was finalized on 9/29/2022 4:32 PM by Dr. Abilio Rothpletz, M.D.        I ordered the above noted radiological studies. Reviewed by me and discussed with radiologist.  See dictation for official radiology interpretation.      PROCEDURES    Procedures      MEDICATIONS  GIVEN IN ER    Medications   sodium chloride 0.9 % flush 10 mL (has no administration in time range)   furosemide (LASIX) injection 40 mg (has no administration in time range)   losartan (COZAAR) tablet 100 mg (has no administration in time range)   carvedilol (COREG) tablet 3.125 mg (has no administration in time range)   levothyroxine (SYNTHROID, LEVOTHROID) tablet 75 mcg (has no administration in time range)   pantoprazole (PROTONIX) EC tablet 40 mg (has no administration in time range)   aspirin chewable tablet 81 mg (has no administration in time range)   nitroglycerin (NITROSTAT) ointment 1 inch (has no administration in time range)   furosemide (LASIX) injection 80 mg (80 mg Intravenous Given 9/29/22 1700)         PROGRESS, DATA ANALYSIS, CONSULTS, AND MEDICAL DECISION MAKING    All labs have been independently reviewed by me.  All radiology studies have been reviewed by me and discussed with radiologist dictating the report.   EKG's independently viewed and interpreted by me.  Discussion below represents my analysis of pertinent findings related to patient's condition, differential diagnosis, treatment plan and final disposition.        ED Course as of 09/29/22 2241   Thu Sep 29, 2022   1641 XR Chest 1 View [DP]   2239 EKG at 1627  Is a very poor quality EKG with significant artifact due to patient having tremor.  Actively this is sinus rhythm as seen best in lead I.  There are no acute ST segment changes to suggest ischemia, but there is nothing available for comparison. [DP]   2240 Chest x-ray appears to show cardiomegaly and vascular congestion [DP]   2240 CBC shows normal white blood cell count hemoglobin 11 [DP]   2240 Chemistry shows some moderate kidney disease which appears to be similar to baseline that I can find in epic [DP]   2240 Troponin is normal and proBNP is 5000 [DP]   2240 Patient was given a dose of Lasix.  We got her up to walk her up and down the hallway and she got quite dyspneic.  Her  O2 sats stayed in the low 90s, but is pretty dramatic increase in her work of breathing.    Patient lives at home by herself, and I would feel better if we keep her in observation status for a little more diuresis.  Also do not see any history of previous underlying congestive failure and she would benefit from a cardiology consult. [DP]      ED Course User Index  [DP] Fritz Camp MD           PPE: The patient wore a surgical mask throughout the entire patient encounter. I wore an N95.    AS OF 22:41 EDT VITALS:    BP - (!) 186/74  HR - 58  TEMP - 97.6 °F (36.4 °C) (Oral)  O2 SATS - 93%        DIAGNOSIS  Final diagnoses:   Combined systolic and diastolic congestive heart failure, unspecified HF chronicity (HCC)         DISPOSITION  Admit           Fritz Camp MD  09/29/22 2241      Electronically signed by Fritz Camp MD at 09/29/22 2241     Qi Hensley PCT at 09/29/22 1841        Pt ambulated around room with walker, pt O2 saturation remained between 93-94% and NAD noted.    Electronically signed by Qi Hensley PCT at 09/29/22 1842     Barbara Garcia, RN at 09/29/22 1856          Nursing report ED to floor  [facility]  89 y.o.  female    HPI :   Chief Complaint   Patient presents with   • Shortness of Breath   • Chest Pain       Admitting doctor:   Ji Wagner MD    Admitting diagnosis:   There were no encounter diagnoses.    Code status:   Current Code Status     Date Active Code Status Order ID Comments User Context       Not on file    Advance Care Planning Activity          Allergies:   Penicillins    Isolation:   No active isolations    Intake and Output  No intake or output data in the 24 hours ending 09/29/22 1857    Weight:       09/29/22  1601   Weight: 45.8 kg (101 lb)       Most recent vitals:   Vitals:    09/29/22 1601 09/29/22 1701 09/29/22 1731 09/29/22 1801   BP:  173/76 171/76 171/74   BP Location:       Patient Position:       Pulse:  54  53   Resp:       [facility]:      "  [facility]       SpO2:  93%  93%   Weight: 45.8 kg (101 lb)      Height: 144.8 cm (57\")          Active LDAs/IV Access:   Lines, Drains & Airways     Active LDAs     Name Placement date Placement time Site Days    Peripheral IV 09/29/22 1559 Left Arm 09/29/22 1559  Arm  less than 1                Labs (abnormal labs have a star):   Labs Reviewed   COMPREHENSIVE METABOLIC PANEL - Abnormal; Notable for the following components:       Result Value    Glucose 110 (*)     BUN 31 (*)     Creatinine 1.23 (*)     Calcium 10.0 (*)     BUN/Creatinine Ratio 25.2 (*)     eGFR 41.3 (*)     All other components within normal limits    Narrative:     GFR Normal >60  Chronic Kidney Disease <60  Kidney Failure <15     PROTIME-INR - Abnormal; Notable for the following components:    Protime 15.4 (*)     INR 1.24 (*)     All other components within normal limits   BNP (IN-HOUSE) - Abnormal; Notable for the following components:    proBNP 5,135.0 (*)     All other components within normal limits    Narrative:     Among patients with dyspnea, NT-proBNP is highly sensitive for the detection of acute congestive heart failure. In addition NT-proBNP of <300 pg/ml effectively rules out acute congestive heart failure with 99% negative predictive value.    Results may be falsely decreased if patient taking Biotin.     MAGNESIUM - Abnormal; Notable for the following components:    Magnesium 2.4 (*)     All other components within normal limits   CBC WITH AUTO DIFFERENTIAL - Abnormal; Notable for the following components:    Hemoglobin 11.2 (*)     MCHC 31.2 (*)     Lymphocyte % 16.8 (*)     All other components within normal limits   APTT - Normal   TROPONIN (IN-HOUSE) - Normal    Narrative:     Troponin T Reference Range:  <= 0.03 ng/mL-   Negative for AMI  >0.03 ng/mL-     Abnormal for myocardial necrosis.  Clinicians would have to utilize clinical acumen, EKG, Troponin and serial changes to determine if it is an Acute Myocardial Infarction " or myocardial injury due to an underlying chronic condition.       Results may be falsely decreased if patient taking Biotin.     TSH - Normal   TROPONIN (IN-HOUSE)   CBC AND DIFFERENTIAL    Narrative:     The following orders were created for panel order CBC & Differential.  Procedure                               Abnormality         Status                     ---------                               -----------         ------                     CBC Auto Differential[[id number]]        Abnormal            Final result                 Please view results for these tests on the individual orders.       EKG:   ECG 12 Lead   Final Result   HEART RATE= 57  bpm   RR Interval= 1053  ms   MT Interval=   ms   P Horizontal Axis=   deg   P Front Axis=   deg   QRSD Interval= 154  ms   QT Interval= 524  ms   QRS Axis= -57  deg   T Wave Axis= 102  deg   - ABNORMAL ECG -   Atrial flutter   LVH with IVCD, LAD and secondary repol abnrm   Probable inferior infarct, recent   Prolonged QT interval   No Prior Tracing for Comparison   Electronically Signed By: [facility] ([facility]) 29-Sep-2022 16:27:03   Date and Time of Study: 2022-09-29 15:38:30          Meds given in ED:   Medications   sodium chloride 0.9 % flush 10 mL (has no administration in time range)   furosemide (LASIX) injection 80 mg (80 mg Intravenous Given 9/29/22 1700)       Imaging results:  XR Chest 1 View    Result Date: 9/29/2022  Cardiac enlargement with prominence of the pulmonary interstitial favored represent pulmonary edema. There may also be a small left pleural effusion.  This report was finalized on 9/29/2022 4:32 PM by Dr. Abilio Glover M.D.        Ambulatory status:   - assist x1    Social issues:   Social History     Socioeconomic History   • Marital status:    Tobacco Use   • Smoking status: Never Smoker   Substance and Sexual Activity   • Alcohol use: No       NIH Stroke Scale:   0      Barbara Garcia RN  09/29/22 18:57  EDT        Electronically signed by Barbara Garcia RN at 09/29/22 1857     Barbara Garcia, RN at 09/29/22 1857        Pt voided 950 of urine via purewick    Electronically signed by Barbara Garcia, RN at 09/29/22 1857       Oxygen Therapy (since admission)     Date/Time SpO2 Device (Oxygen Therapy) Flow (L/min) Oxygen Concentration (%) ETCO2 (mmHg)    10/03/22 0821 96 room air -- -- --    10/02/22 2317 -- room air -- -- --    10/02/22 2020 -- room air -- -- --    10/02/22 1937 -- room air -- -- --    10/02/22 1517 -- room air -- -- --    10/02/22 1516 95 room air -- -- --    10/02/22 0750 93 room air -- -- --    10/01/22 2308 96 room air -- -- --    10/01/22 2055 -- room air -- -- --    10/01/22 1941 -- room air -- -- --    10/01/22 1304 94 room air -- -- --    10/01/22 0800 -- room air -- -- --    10/01/22 0715 94 room air -- -- --    10/01/22 0018 -- room air -- -- --    09/30/22 2309 92 room air -- -- --    09/30/22 2000 -- room air -- -- --    09/30/22 1948 92 room air -- -- --    09/30/22 1123 93 room air -- -- --    09/30/22 0718 92 room air -- -- --    09/30/22 0434 92 room air -- -- --    09/29/22 2338 92 room air -- -- --    09/29/22 2115 -- room air -- -- --    09/29/22 2102 93 room air -- -- --    09/29/22 1801 93 -- -- -- --    09/29/22 1701 93 -- -- -- --    09/29/22 1540 94 room air -- -- --        Intake & Output (last 3 days)       09/30 0701  10/01 0700 10/01 0701  10/02 0700 10/02 0701  10/03 0700 10/03 0701  10/04 0700    P.O. 360 300 200     Total Intake(mL/kg) 360 (8.3) 300 (6.9) 200 (5)     Urine (mL/kg/hr) 1600 (1.5) 1300 (1.3) 350 (0.4)     Stool   0     Total Output 1600 1300 350     Net -1240 -1000 -150             Urine Unmeasured Occurrence 1 x 0 x 4 x     Stool Unmeasured Occurrence   1 x          Lines, Drains & Airways     Active LDAs     Name Placement date Placement time Site Days    Peripheral IV 09/29/22 1559 Left Arm 09/29/22  1559  Arm  3          Inactive LDAs      "None                Operative/Procedure Notes (all)    No notes of this type exist for this encounter.            Physician Progress Notes (all)      Ji Wagner MD at 10/02/22 2949          Daily progress note    Primary care physician      Chief complaint  Doing better with no new complaints and making slow progress.  Patient family at bedside.  Patient denies any chest pain increase shortness of breath palpitation.    History of present illness  89-year-old white female with history of hypertension hypothyroidism osteoarthritis and gastroesophageal disease presented to [facility] emergency room with shortness of breath for last 1 week with chest heaviness and leg swelling.  Patient denies any fever cough abdominal pain nausea vomiting diarrhea.  Patient never been to this hospital before and followed by [facility] geriatric service.  Patient followed commands and answer all question appropriately and no her medications reviewed and refusing to take Coreg and losartan which is her home medications.     REVIEW OF SYSTEMS  Per history and physical     PHYSICAL EXAM  Blood pressure 136/65, pulse 71, temperature 98 °F (36.7 °C), temperature source Oral, resp. rate 20, height 139.7 cm (55\"), weight 43.2 kg (95 lb 3.8 oz), SpO2 95 %.    GENERAL: Awake and alert, very pleasant nontoxic-appearing  HEENT:  Unremarkable  NECK:  Supple  CV: regular rhythm, regular rate  RESPIRATORY: normal effort, significant increase in work of breathing with minimal exertion with some rales in bases  ABDOMEN: soft nontender bowel sounds positive  MUSCULOSKELETAL: no deformity, trace pedal edema bilaterally   NEURO: alert, moves all extremities, follows commands  SKIN: warm, dry     LAB RESULTS  Lab Results (last 24 hours)     Procedure Component Value Units Date/Time    Basic Metabolic Panel [[id number]]  (Abnormal) Collected: 10/02/22 0424    Specimen: Blood Updated: 10/02/22 0525     Glucose 129 " mg/dL      BUN 50 mg/dL      Creatinine 1.88 mg/dL      Sodium 142 mmol/L      Potassium 4.0 mmol/L      Chloride 97 mmol/L      CO2 31.0 mmol/L      Calcium 10.1 mg/dL      BUN/Creatinine Ratio 26.6     Anion Gap 14.0 mmol/L      eGFR 24.8 mL/min/1.73      Comment: National Kidney Foundation and American Society of Nephrology (ASN) Task Force recommended calculation based on the Chronic Kidney Disease Epidemiology Collaboration (CKD-EPI) equation refit without adjustment for race.       Narrative:      GFR Normal >60  Chronic Kidney Disease <60  Kidney Failure <15      CBC & Differential [[id number]]  (Abnormal) Collected: 10/02/22 0424    Specimen: Blood Updated: 10/02/22 0455    Narrative:      The following orders were created for panel order CBC & Differential.  Procedure                               Abnormality         Status                     ---------                               -----------         ------                     CBC Auto Differential[[id number]]        Abnormal            Final result                 Please view results for these tests on the individual orders.    CBC Auto Differential [[id number]]  (Abnormal) Collected: 10/02/22 0424    Specimen: Blood Updated: 10/02/22 0455     WBC 8.15 10*3/mm3      RBC 4.36 10*6/mm3      Hemoglobin 12.2 g/dL      Hematocrit 38.0 %      MCV 87.2 fL      MCH 28.0 pg      MCHC 32.1 g/dL      RDW 14.4 %      RDW-SD 46.2 fl      MPV 11.2 fL      Platelets 171 10*3/mm3      Neutrophil % 70.3 %      Lymphocyte % 17.7 %      Monocyte % 9.1 %      Eosinophil % 2.1 %      Basophil % 0.4 %      Immature Grans % 0.4 %      Neutrophils, Absolute 5.74 10*3/mm3      Lymphocytes, Absolute 1.44 10*3/mm3      Monocytes, Absolute 0.74 10*3/mm3      Eosinophils, Absolute 0.17 10*3/mm3      Basophils, Absolute 0.03 10*3/mm3      Immature Grans, Absolute 0.03 10*3/mm3      nRBC 0.0 /100 WBC     PTH, Intact [[id number]]  (Abnormal) Collected: 10/01/22 2020    Specimen: Blood  Updated: 10/01/22 2059     PTH, Intact 243.0 pg/mL     Narrative:      Results may be falsely decreased if patient taking Biotin.      Protein / Creatinine Ratio, Urine - Urine, Clean Catch [[id number]] Collected: 10/01/22 1958    Specimen: Urine, Clean Catch Updated: 10/01/22 2035     Protein/Creatinine Ratio, Urine --     Comment: Unable to calculate        Creatinine, Urine 32.9 mg/dL      Total Protein, Urine <4.0 mg/dL     Sodium, Urine, Random - Urine, Clean Catch [[id number]] Collected: 10/01/22 1958    Specimen: Urine, Clean Catch Updated: 10/01/22 2035     Sodium, Urine 107 mmol/L     Narrative:      Reference intervals for random urine have not been established.  Clinical usage is dependent upon physician's interpretation in combination with other laboratory tests.       Urinalysis With Microscopic If Indicated (No Culture) - Urine, Clean Catch [[id number]]  (Normal) Collected: 10/01/22 1958    Specimen: Urine, Clean Catch Updated: 10/01/22 2015     Color, UA Yellow     Appearance, UA Clear     pH, UA 7.0     Specific Gravity, UA 1.009     Glucose, UA Negative     Ketones, UA Negative     Bilirubin, UA Negative     Blood, UA Negative     Protein, UA Negative     Leuk Esterase, UA Negative     Nitrite, UA Negative     Urobilinogen, UA 0.2 E.U./dL    Narrative:      Urine microscopic not indicated.        Imaging Results (Last 24 Hours)     ** No results found for the last 24 hours. **        ECG 12 Lead  Component   Ref Range & Units 1 d ago    QT Interval   ms 524    Resulting Agency  ECG             HEART RATE= 57  bpm  RR Interval= 1053  ms  [place] Interval=   ms  P Horizontal Axis=   deg  P Front Axis=   deg  QRSD Interval= 154  ms  QT Interval= 524  ms  QRS Axis= -57  deg  T Wave Axis= 102  deg  - ABNORMAL ECG -  Atrial flutter  LVH with IVCD, LAD and secondary repol abnrm  Probable inferior infarct, recent  Prolonged QT interval  No Prior Tracing for Comparison             Current Facility-Administered  Medications:   •  amLODIPine (NORVASC) tablet 10 mg, 10 mg, Oral, Daily, Ji Wagner MD, 10 mg at 10/02/22 0812  •  aspirin chewable tablet 81 mg, 81 mg, Oral, Daily, Ji Wagner MD, 81 mg at 10/02/22 0812  •  clidinium-chlordiazePOXIDE (LIBRAX) 5-2.5 MG per capsule 1 capsule, 1 capsule, Oral, BID, Ji Wagner MD, 1 capsule at 10/02/22 0812  •  HYDROcodone-acetaminophen (NORCO) 5-325 MG per tablet 1 tablet, 1 tablet, Oral, Q8H PRN, Ji Wagner MD  •  isosorbide mononitrate (IMDUR) 24 hr tablet 30 mg, 30 mg, Oral, Q24H, Ji Wagner MD, 30 mg at 10/02/22 0812  •  lactobacillus acidophilus (RISAQUAD) capsule 1 capsule, 1 capsule, Oral, Daily, Ji Wagner MD, 1 capsule at 10/02/22 0812  •  levothyroxine (SYNTHROID, LEVOTHROID) tablet 50 mcg, 50 mcg, Oral, Q AM, Ji Wagner MD, 50 mcg at 10/02/22 0621  •  nitroglycerin (NITROSTAT) SL tablet 0.4 mg, 0.4 mg, Sublingual, Q5 Min PRN, Ji Wagner MD  •  pantoprazole (PROTONIX) EC tablet 40 mg, 40 mg, Oral, BID AC, Ji Wagner MD, 40 mg at 10/02/22 0620  •  potassium chloride (K-DUR,KLOR-CON) ER tablet 20 mEq, 20 mEq, Oral, BID With Meals, Osmin Bennett MD, 20 mEq at 10/02/22 0812  •  [COMPLETED] Insert peripheral IV, , , Once **AND** sodium chloride 0.9 % flush 10 mL, 10 mL, Intravenous, PRN, Osmin Johansen MD  •  sodium chloride 0.9 % infusion, 100 mL/hr, Intravenous, Continuous, Osmin Bennett MD, Last Rate: 100 mL/hr at 10/02/22 1510, 100 mL/hr at 10/02/22 1510     ASSESSMENT  Acute on chronic diastolic congestive heart failure  Acute kidney injury  Severe aortic stenosis  Hypokalemia resolved  Hypertension  Hypothyroidism  Osteoarthritis  Chronic kidney disease stage III  Gastroesophageal reflux disease    PLAN  CPM  IVF  Continue to hold diuretics   Replace potassium  Strict I's and O's and daily weight  Nephrology consult appreciated  Cardiology to follow patient  Adjust home medications  Stress ulcer DVT prophylaxis  Supportive care  PT/OT  Discussed  "with nursing staff and family  Follow closely and further recommendation current hospital course    SOURAV WAGNER MD    Electronically signed by Sourav Wagner MD at 10/02/22 1549     Osmin Bennett MD at 10/02/22 1004           LOS: 0 days   Patient Care Team:  Giles Wheeler MD as PCP - General (Family Medicine)  Wily Aburto MD as Referring Physician (Nephrology)    Chief Complaint: SIENNA/CKD    Subjective     History of Present Illness  Pt without any acute complaints  Breathing at baseline.  Tolerating some po.    Subjective:  Symptoms:  No shortness of breath, chest pain, chest pressure or anxiety.    Diet:  No nausea or vomiting.    Pain:  She reports no pain.        History taken from: patient    Objective     Vital Sign Min/Max for last 24 hours  Temp  Min: 97 °F (36.1 °C)  Max: 98 °F (36.7 °C)   BP  Min: 127/70  Max: 140/73   Pulse  Min: 66  Max: 72   Resp  Min: 18  Max: 18   SpO2  Min: 93 %  Max: 96 %   No data recorded   Weight  Min: 43.2 kg (95 lb 3.8 oz)  Max: 43.2 kg (95 lb 3.8 oz)     Flowsheet Rows    Flowsheet Row First Filed Value   Admission Height 144.8 cm (57\") Documented at 09/29/2022 1601   Admission Weight 45.8 kg (101 lb) Documented at 09/29/2022 1601          No intake/output data recorded.  I/O last 3 completed shifts:  In: 300 [P.O.:300]  Out: 2100 [Urine:2100]    Objective:  General Appearance:  Comfortable.    Vital signs: (most recent): Blood pressure 134/70, pulse 66, temperature 97.1 °F (36.2 °C), temperature source Oral, resp. rate 18, height 139.7 cm (55\"), weight 43.2 kg (95 lb 3.8 oz), SpO2 93 %.  Vital signs are normal.    Output: Producing urine.    HEENT: Normal HEENT exam.    Lungs:  Normal effort and normal respiratory rate.    Heart: Normal rate.  Regular rhythm.    Abdomen: Abdomen is soft.  Bowel sounds are normal.   There is no abdominal tenderness.     Extremities: Normal range of motion.  There is no dependent edema.    Pulses: Distal pulses are intact.  "   Neurological: Patient is alert and oriented to person, place and time.    Pupils:  Pupils are equal, round, and reactive to light.    Skin:  Warm and dry.              Results Review:     I reviewed the patient's new clinical results.    WBC WBC   Date Value Ref Range Status   10/02/2022 8.15 3.40 - 10.80 10*3/mm3 Final   10/01/2022 8.15 3.40 - 10.80 10*3/mm3 Final   09/30/2022 6.79 3.40 - 10.80 10*3/mm3 Final   09/29/2022 5.79 3.40 - 10.80 10*3/mm3 Final      HGB Hemoglobin   Date Value Ref Range Status   10/02/2022 12.2 12.0 - 15.9 g/dL Final   10/01/2022 12.2 12.0 - 15.9 g/dL Final   09/30/2022 11.2 (L) 12.0 - 15.9 g/dL Final   09/29/2022 11.2 (L) 12.0 - 15.9 g/dL Final      HCT Hematocrit   Date Value Ref Range Status   10/02/2022 38.0 34.0 - 46.6 % Final   10/01/2022 37.9 34.0 - 46.6 % Final   09/30/2022 34.4 34.0 - 46.6 % Final   09/29/2022 35.9 34.0 - 46.6 % Final      Platlets No results found for: LABPLAT   MCV MCV   Date Value Ref Range Status   10/02/2022 87.2 79.0 - 97.0 fL Final   10/01/2022 86.3 79.0 - 97.0 fL Final   09/30/2022 86.2 79.0 - 97.0 fL Final   09/29/2022 88.9 79.0 - 97.0 fL Final          Sodium Sodium   Date Value Ref Range Status   10/02/2022 142 136 - 145 mmol/L Final   10/01/2022 140 136 - 145 mmol/L Final   09/30/2022 142 136 - 145 mmol/L Final   09/29/2022 139 136 - 145 mmol/L Final      Potassium Potassium   Date Value Ref Range Status   10/02/2022 4.0 3.5 - 5.2 mmol/L Final   10/01/2022 3.3 (L) 3.5 - 5.2 mmol/L Final   09/30/2022 3.3 (L) 3.5 - 5.2 mmol/L Final   09/29/2022 4.5 3.5 - 5.2 mmol/L Final     Comment:     Slight hemolysis detected by analyzer. Results may be affected.      Chloride Chloride   Date Value Ref Range Status   10/02/2022 97 (L) 98 - 107 mmol/L Final   10/01/2022 98 98 - 107 mmol/L Final   09/30/2022 101 98 - 107 mmol/L Final   09/29/2022 104 98 - 107 mmol/L Final      CO2 CO2   Date Value Ref Range Status   10/02/2022 31.0 (H) 22.0 - 29.0 mmol/L Final    10/01/2022 29.9 (H) 22.0 - 29.0 mmol/L Final   09/30/2022 27.1 22.0 - 29.0 mmol/L Final   09/29/2022 22.9 22.0 - 29.0 mmol/L Final      BUN BUN   Date Value Ref Range Status   10/02/2022 50 (H) 8 - 23 mg/dL Final   10/01/2022 41 (H) 8 - 23 mg/dL Final   09/30/2022 32 (H) 8 - 23 mg/dL Final   09/29/2022 31 (H) 8 - 23 mg/dL Final      Creatinine Creatinine   Date Value Ref Range Status   10/02/2022 1.88 (H) 0.57 - 1.00 mg/dL Final   10/01/2022 1.53 (H) 0.57 - 1.00 mg/dL Final   09/30/2022 0.99 0.57 - 1.00 mg/dL Final   09/29/2022 1.23 (H) 0.57 - 1.00 mg/dL Final      Calcium Calcium   Date Value Ref Range Status   10/02/2022 10.1 (H) 8.2 - 9.6 mg/dL Final   10/01/2022 10.2 (H) 8.2 - 9.6 mg/dL Final   09/30/2022 10.0 (H) 8.2 - 9.6 mg/dL Final   09/29/2022 10.0 (H) 8.2 - 9.6 mg/dL Final      PO4 No results found for: CAPO4   Albumin Albumin   Date Value Ref Range Status   10/01/2022 4.00 3.50 - 5.20 g/dL Final   09/29/2022 4.00 3.50 - 5.20 g/dL Final      Magnesium Magnesium   Date Value Ref Range Status   09/29/2022 2.4 (H) 1.7 - 2.3 mg/dL Final      Uric Acid No results found for: URICACID     Medication Review:   amLODIPine, 10 mg, Oral, Daily  aspirin, 81 mg, Oral, Daily  clidinium-chlordiazePOXIDE, 1 capsule, Oral, BID  isosorbide mononitrate, 30 mg, Oral, Q24H  lactobacillus acidophilus, 1 capsule, Oral, Daily  levothyroxine, 50 mcg, Oral, Q AM  pantoprazole, 40 mg, Oral, BID AC  potassium chloride, 20 mEq, Oral, BID With Meals          Assessment & Plan       CHF (congestive heart failure) ([facility])      Assessment & Plan  SIENNA/CKD3b-  Reported baseline creatinine of 1.2-1.4.  Due to nephrosclerosis.  Her creatinine had increased to 1.53->1.88 today.  Likely related to diuresis with AS as noted.  diuretics on hold.  Will give back some IVF's today with 500cc NS and monitor.  HTN-  Had recently been started on carvedilol.  Back on norvasc now, previously held due to concern for LE edema.  Will  monitor.  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  PTH up at 243.  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  Normal EF.  Elevated BNP at 5135.    Osmin Bennett MD  10/02/22  10:04 EDT            Electronically signed by Osmin Bennett MD at 10/02/22 1011     Bri Larsen APRN at 10/02/22 0722                    Electrophysiology Follow-Up Note      Patient Name: Joycelyn Esqueda  Age/Sex: 89 y.o. female  : 1930  MRN: [id number]      Day of Service: 10/02/22       Chief Complaint/Follow-up: CHF/chest heaviness    Interval History: No acute events overnight, denies chest pain or shortness of breath this morning      Temp:  [97 °F (36.1 °C)-98 °F (36.7 °C)] 97 °F (36.1 °C)  Heart Rate:  [67-72] 72  Resp:  [18] 18  BP: (127-140)/(70-73) 140/73     PHYSICAL EXAM:    General Appearance: No acute distress, frail elderly female  Eyes: Conjunctiva and lids: No erythema, swelling, or discharge. Sclera non-icteric.   HENT: Atraumatic, normocephalic. External eyes, ears, and nose normal.   Respiratory: No signs of respiratory distress. Respiration rhythm and depth normal.   Cardiovascular:  Heart Rate and Rhythm: Normal, Heart Sounds:  S1 and S2.   Murmur heard  Lower Extremities: No edema noted.  Gastrointestinal:  Abdomen soft, non-distended, non-tender.  Musculoskeletal: Moves all extremities, significant kyphosis noted.   Skin: Warm and dry.   Psychiatric: Patient alert and oriented to person, place, and time.       ECG/TELE:           Results from last 7 days   Lab Units 10/02/22  0424 10/01/22  0416 22  0418   SODIUM mmol/L 142 140 142   POTASSIUM mmol/L 4.0 3.3* 3.3*   CHLORIDE mmol/L 97* 98 101   CO2 mmol/L 31.0* 29.9* 27.1   BUN mg/dL 50* 41* 32*   CREATININE mg/dL 1.88* 1.53* 0.99   GLUCOSE mg/dL 129* 117* 101*   CALCIUM mg/dL 10.1* 10.2* 10.0*     Results from last 7 days    Lab Units 10/02/22  0424 10/01/22  0417 09/30/22  0418   WBC 10*3/mm3 8.15 8.15 6.79   HEMOGLOBIN g/dL 12.2 12.2 11.2*   HEMATOCRIT % 38.0 37.9 34.4   PLATELETS 10*3/mm3 171 177 166     Results from last 7 days   Lab Units 09/29/22  1558   INR  1.24*     Results from last 7 days   Lab Units 10/01/22  0416 09/30/22  0418 09/29/22  1904 09/29/22  1558   TROPONIN T ng/mL 0.025 0.021 0.012 0.010     Results from last 7 days   Lab Units 10/01/22  0416   TSH uIU/mL 3.130           Current Medications:   Scheduled Meds:amLODIPine, 10 mg, Oral, Daily  aspirin, 81 mg, Oral, Daily  clidinium-chlordiazePOXIDE, 1 capsule, Oral, BID  isosorbide mononitrate, 30 mg, Oral, Q24H  lactobacillus acidophilus, 1 capsule, Oral, Daily  levothyroxine, 50 mcg, Oral, Q AM  pantoprazole, 40 mg, Oral, BID AC  potassium chloride, 20 mEq, Oral, BID With Meals            CHF (congestive heart failure) ([facility])       Plan:     Covering weekend for Dr. Mei, he will resume care tomorrow     --Acute heart failure/volume overload likely diastolic and valvular related given echo results showing severe AS. She has been on IV diuretics, responded well and looked fairly euvolemic yesterday, creat was up to 1.5---stopped diuretics and nephrology was consulted.     --Severe AS by echo this admission, normal EF 65%, she is pretty frail, so not sure if indicated if she would be a candidate. Will defer to Dr. Mei.      --HTN, currently controlled. Losartan, cavedilol and clonidine are all on hold, will continue to hold for now.      --SIENNA on CKD, creat 1.5 10/1, 1.8 today--she does have know CKD--nephrology following     --Chest pain, no recurrence, trop negative. Will defer any further work up to Dr. Mei.         JOSÉ MIGUEL Augustine  10/02/22  07:22 EDT      Electronically signed by Bri Larsen APRN at 10/02/22 1007     Bri Larsen APRN at 10/01/22 1011                    Cardiology Follow-Up Note      Patient Name: Joycelyn  [facility]  Age/Sex: 89 y.o. female  : 1930  MRN: [id number]      Day of Service: 10/01/22       Chief Complaint/Follow-up: CHF/chest heaviness    Interval History: No acute events overnight, she is up in chair on room air, breathing much improved.       Temp:  [97.3 °F (36.3 °C)-98.4 °F (36.9 °C)] 98.1 °F (36.7 °C)  Heart Rate:  [58-66] 63  Resp:  [18] 18  BP: (114-137)/(54-63) 128/63     PHYSICAL EXAM:    General Appearance: No acute distress, elderly, frail female.    Eyes: Conjunctiva and lids: No erythema, swelling, or discharge. Sclera non-icteric.   HENT: Atraumatic, normocephalic. External eyes, ears, and nose normal.   Respiratory: No signs of respiratory distress. Respiration rhythm and depth normal.   Clear to auscultation. No rales, crackles, rhonchi, or wheezing auscultated.   Cardiovascular:  Heart Rate and Rhythm: Normal, Heart Sounds: NS1 and S2.  Systolic murmur noted.    Lower Extremities: No edema noted.  Gastrointestinal:  Abdomen soft, non-distended, non-tender.  Musculoskeletal: Moves all extremities, notably significantly kyphotic  Skin: Warm and dry.   Psychiatric: Patient alert and oriented to person, place, and time. Speech and behavior appropriate. Normal mood and affect.       ECG/TELE:           Results from last 7 days   Lab Units 10/01/22  0416 09/30/22  0418 09/29/22  1558   SODIUM mmol/L 140 142 139   POTASSIUM mmol/L 3.3* 3.3* 4.5   CHLORIDE mmol/L 98 101 104   CO2 mmol/L 29.9* 27.1 22.9   BUN mg/dL 41* 32* 31*   CREATININE mg/dL 1.53* 0.99 1.23*   GLUCOSE mg/dL 117* 101* 110*   CALCIUM mg/dL 10.2* 10.0* 10.0*     Results from last 7 days   Lab Units 10/01/22  0417 09/30/22  0418 09/29/22  1558   WBC 10*3/mm3 8.15 6.79 5.79   HEMOGLOBIN g/dL 12.2 11.2* 11.2*   HEMATOCRIT % 37.9 34.4 35.9   PLATELETS 10*3/mm3 177 166 160     Results from last 7 days   Lab Units 22  1558   INR  1.24*     Results from last 7 days   Lab Units 10/01/22  0416 22  0418 22  1905  09/29/22  1558   TROPONIN T ng/mL 0.025 0.021 0.012 0.010     Results from last 7 days   Lab Units 10/01/22  0416   TSH uIU/mL 3.130           Current Medications:   Scheduled Meds:amLODIPine, 10 mg, Oral, Daily  aspirin, 81 mg, Oral, Daily  clidinium-chlordiazePOXIDE, 1 capsule, Oral, BID  furosemide, 40 mg, Intravenous, BID  lactobacillus acidophilus, 1 capsule, Oral, Daily  levothyroxine, 50 mcg, Oral, Q AM  nitroglycerin, 1 inch, Topical, Q6H  pantoprazole, 40 mg, Oral, BID AC  potassium chloride, 20 mEq, Oral, BID With Meals      9/30/2022 Echo:    Interpretation Summary    · Estimated right ventricular systolic pressure from tricuspid regurgitation is normal (<35 mmHg).  · Peak velocity of the flow distal to the aortic valve is 427.7 cm/s. Aortic valve maximum pressure gradient is 73.2 mmHg. Aortic valve mean pressure gradient is 41.5 mmHg. Aortic valve dimensionless index is 0.2 .  · Severe aortic valve stenosis is present. Aortic valve area is 0.63 cm2.  · Calculated left ventricular EF = 65.8% Estimated left ventricular EF was in agreement with the calculated left ventricular EF.  · Left ventricular diastolic function is consistent with (grade Ia w/high LAP) impaired relaxation.  · The right atrial cavity is borderline dilated.  · There is a small (<1cm) pericardial effusion.           CHF (congestive heart failure) (Formerly [facility])       Plan:     Covering weekend for Dr. Mie:     --Acute heart failure/volume overload likely diastolic and valvular related given echo results showing severe AS. She has been on IV diuretics, responded well and looks fairly euvolemic today, creat jumped from 0.9 to 1.5. Will stop diuretics for now (she got am dose) and see what she looks like tomorrow.     --Severe AS by echo this admission, normal EF 65%, she is pretty frail, so not sure if indicated if she would be a candidate. Will defer to Dr. Mei.     --HTN, currently controlled. Losartan, cavedilol and clonidine  "are all on hold, will continue to hold for now.     --SIENNA on CKD, creat 1.5--she does have know CKD (follows w/Dr. Handy). Will see what renal function looks like tomorrow, may need to get them involved.    --Chest pain, no recurrence, trop negative. Will defer any further work up to Dr. Mei.      JOSÉ MIGUEL Augustine  10/01/22  10:11 EDT      Electronically signed by Bri Larsen APRN at 10/01/22 1253     Ji Wagner MD at 10/01/22 0902          Daily progress note    Primary care physician      Chief complaint  Doing better with no new complaints but still short of breath with exertion but no chest pain palpitation.  Patient seen and examined in presence of nursing staff.    History of present illness  89-year-old white female with history of hypertension hypothyroidism osteoarthritis and gastroesophageal disease presented to [facility] emergency room with shortness of breath for last 1 week with chest heaviness and leg swelling.  Patient denies any fever cough abdominal pain nausea vomiting diarrhea.  Patient never been to this hospital before and followed by [facility] geriatric service.  Patient followed commands and answer all question appropriately and no her medications reviewed and refusing to take Coreg and losartan which is her home medications.     REVIEW OF SYSTEMS  Per history and physical     PHYSICAL EXAM  Blood pressure 127/70, pulse 67, temperature 98 °F (36.7 °C), temperature source Oral, resp. rate 18, height 139.7 cm (55\"), weight 43.5 kg (95 lb 14.4 oz), SpO2 94 %.    GENERAL: Awake and alert, very pleasant nontoxic-appearing  HEENT:  Unremarkable  NECK:  Supple  CV: regular rhythm, regular rate  RESPIRATORY: normal effort, significant increase in work of breathing with minimal exertion with some rales in bases  ABDOMEN: soft nontender bowel sounds positive  MUSCULOSKELETAL: no deformity, trace pedal edema bilaterally   NEURO: alert, moves " all extremities, follows commands  SKIN: warm, dry     LAB RESULTS  Lab Results (last 24 hours)     Procedure Component Value Units Date/Time    Comprehensive Metabolic Panel [[id number]]  (Abnormal) Collected: 10/01/22 0416    Specimen: Blood Updated: 10/01/22 0519     Glucose 117 mg/dL      BUN 41 mg/dL      Creatinine 1.53 mg/dL      Sodium 140 mmol/L      Potassium 3.3 mmol/L      Chloride 98 mmol/L      CO2 29.9 mmol/L      Calcium 10.2 mg/dL      Total Protein 6.4 g/dL      Albumin 4.00 g/dL      ALT (SGPT) 16 U/L      AST (SGOT) 18 U/L      Alkaline Phosphatase 87 U/L      Total Bilirubin 0.7 mg/dL      Globulin 2.4 gm/dL      A/G Ratio 1.7 g/dL      BUN/Creatinine Ratio 26.8     Anion Gap 12.1 mmol/L      eGFR 31.8 mL/min/1.73      Comment: National Kidney Foundation and American Society of Nephrology (ASN) Task Force recommended calculation based on the Chronic Kidney Disease Epidemiology Collaboration (CKD-EPI) equation refit without adjustment for race.       Narrative:      GFR Normal >60  Chronic Kidney Disease <60  Kidney Failure <15      Troponin [[id number]]  (Normal) Collected: 10/01/22 0416    Specimen: Blood Updated: 10/01/22 0514     Troponin T 0.025 ng/mL     Narrative:      Troponin T Reference Range:  <= 0.03 ng/mL-   Negative for AMI  >0.03 ng/mL-     Abnormal for myocardial necrosis.  Clinicians would have to utilize clinical acumen, EKG, Troponin and serial changes to determine if it is an Acute Myocardial Infarction or myocardial injury due to an underlying chronic condition.       Results may be falsely decreased if patient taking Biotin.      TSH [[id number]]  (Normal) Collected: 10/01/22 0416    Specimen: Blood Updated: 10/01/22 0514     TSH 3.130 uIU/mL     Lipid Panel [[id number]]  (Abnormal) Collected: 10/01/22 0416    Specimen: Blood Updated: 10/01/22 0510     Total Cholesterol 140 mg/dL      Triglycerides 51 mg/dL      HDL Cholesterol 68 mg/dL      LDL Cholesterol  61 mg/dL      VLDL  Cholesterol 11 mg/dL      LDL/HDL Ratio 0.91    Narrative:      Cholesterol Reference Ranges  (U.S. Department of Health and Human Services ATP III Classifications)    Desirable          <200 mg/dL  Borderline High    200-239 mg/dL  High Risk          >240 mg/dL      Triglyceride Reference Ranges  (U.S. Department of Health and Human Services ATP III Classifications)    Normal           <150 mg/dL  Borderline High  150-199 mg/dL  High             200-499 mg/dL  Very High        >500 mg/dL    HDL Reference Ranges  (U.S. Department of Health and Human Services ATP III Classifications)    Low     <40 mg/dl (major risk factor for CHD)  High    >60 mg/dl ('negative' risk factor for CHD)        LDL Reference Ranges  (U.S. Department of Health and Human Services ATP III Classifications)    Optimal          <100 mg/dL  Near Optimal     100-129 mg/dL  Borderline High  130-159 mg/dL  High             160-189 mg/dL  Very High        >189 mg/dL    Hemoglobin A1c [[id number]]  (Abnormal) Collected: 10/01/22 0416    Specimen: Blood Updated: 10/01/22 0457     Hemoglobin A1C 6.00 %     Narrative:      Hemoglobin A1C Ranges:    Increased Risk for Diabetes  5.7% to 6.4%  Diabetes                     >= 6.5%  Diabetic Goal                < 7.0%    CBC & Differential [[id number]]  (Abnormal) Collected: 10/01/22 0417    Specimen: Blood Updated: 10/01/22 0448    Narrative:      The following orders were created for panel order CBC & Differential.  Procedure                               Abnormality         Status                     ---------                               -----------         ------                     CBC Auto Differential[[id number]]        Abnormal            Final result                 Please view results for these tests on the individual orders.    CBC Auto Differential [[id number]]  (Abnormal) Collected: 10/01/22 0417    Specimen: Blood Updated: 10/01/22 0448     WBC 8.15 10*3/mm3      RBC 4.39 10*6/mm3      Hemoglobin  12.2 g/dL      Hematocrit 37.9 %      MCV 86.3 fL      MCH 27.8 pg      MCHC 32.2 g/dL      RDW 14.3 %      RDW-SD 45.1 fl      MPV 11.1 fL      Platelets 177 10*3/mm3      Neutrophil % 70.5 %      Lymphocyte % 15.8 %      Monocyte % 11.2 %      Eosinophil % 1.7 %      Basophil % 0.4 %      Immature Grans % 0.4 %      Neutrophils, Absolute 5.75 10*3/mm3      Lymphocytes, Absolute 1.29 10*3/mm3      Monocytes, Absolute 0.91 10*3/mm3      Eosinophils, Absolute 0.14 10*3/mm3      Basophils, Absolute 0.03 10*3/mm3      Immature Grans, Absolute 0.03 10*3/mm3      nRBC 0.0 /100 WBC         Imaging Results (Last 24 Hours)     ** No results found for the last 24 hours. **        ECG 12 Lead  Component   Ref Range & Units 1 d ago    QT Interval   ms 524    Resulting Agency  ECG             HEART RATE= 57  bpm  RR Interval= 1053  ms  [place] Interval=   ms  P Horizontal Axis=   deg  P Front Axis=   deg  QRSD Interval= 154  ms  QT Interval= 524  ms  QRS Axis= -57  deg  T Wave Axis= 102  deg  - ABNORMAL ECG -  Atrial flutter  LVH with IVCD, LAD and secondary repol abnrm  Probable inferior infarct, recent  Prolonged QT interval  No Prior Tracing for Comparison             Current Facility-Administered Medications:   •  amLODIPine (NORVASC) tablet 10 mg, 10 mg, Oral, Daily, Ji Wagner MD, 10 mg at 10/01/22 0945  •  aspirin chewable tablet 81 mg, 81 mg, Oral, Daily, Ji Wagner MD, 81 mg at 10/01/22 0945  •  clidinium-chlordiazePOXIDE (LIBRAX) 5-2.5 MG per capsule 1 capsule, 1 capsule, Oral, BID, Ji Wagner MD, 1 capsule at 10/01/22 0944  •  HYDROcodone-acetaminophen (NORCO) 5-325 MG per tablet 1 tablet, 1 tablet, Oral, Q8H PRN, Ji Wagner MD  •  lactobacillus acidophilus (RISAQUAD) capsule 1 capsule, 1 capsule, Oral, Daily, Ji Wagner MD, 1 capsule at 10/01/22 0944  •  levothyroxine (SYNTHROID, LEVOTHROID) tablet 50 mcg, 50 mcg, Oral, Q AM, Ji Wagner MD, 50 mcg at 10/01/22 0533  •  nitroglycerin (NITROSTAT)  ointment 1 inch, 1 inch, Topical, Q6H, Sourav Wagner MD, 1 inch at 10/01/22 1357  •  nitroglycerin (NITROSTAT) SL tablet 0.4 mg, 0.4 mg, Sublingual, Q5 Min PRN, Sourav Wagner MD  •  pantoprazole (PROTONIX) EC tablet 40 mg, 40 mg, Oral, BID AC, Sourav Wagner MD, 40 mg at 10/01/22 0605  •  potassium chloride (K-DUR,KLOR-CON) ER tablet 20 mEq, 20 mEq, Oral, BID With Meals, Sourav Wagner MD, 20 mEq at 10/01/22 0945  •  [COMPLETED] Insert peripheral IV, , , Once **AND** sodium chloride 0.9 % flush 10 mL, 10 mL, Intravenous, PRN, Osmin Johansen MD     ASSESSMENT  Acute on chronic congestive heart failure  Hypokalemia  Hypertension  Hypothyroidism  Osteoarthritis  Gastroesophageal reflux disease    PLAN  CPN  Hold diuretics as worsening BUN/creatinine  Replace potassium  Strict I's and O's and daily weight  Nephrology consult  Cardiology to follow patient  Adjust home medications  Stress ulcer DVT prophylaxis  Supportive care  PT/OT  Discussed with nursing staff  Follow closely further recommendation current hospital course    SOURAV WAGNER MD    Electronically signed by Sourav Wagner MD at 10/01/22 1407          Consult Notes (all)      Osmin Bennett MD at 10/01/22 1455            Patient Care Team:  Giles Wheeler MD as PCP - General (Family Medicine)  Wily Handy MD as Referring Physician (Nephrology)    Chief complaint: SIENNA/CKD3b    Subjective     History of Present Illness  89yo presented to ER with increased dyspenea for about 1 week.  She had also noted increase LE edema.  She has reported h/o CKD 3b with baseline creatinine of 1.2-1.4.  She is followed with Dr. Handy in our group.  She is without any acute complaints at this time.  She had worsened creatinine to 1.5 with diuretics and we were asked to see her.    Review of Systems   Constitutional: Negative for chills and fever.   Respiratory: Negative for cough and shortness of breath.    Cardiovascular: Negative for chest pain.   Genitourinary:  Negative for dysuria.   Musculoskeletal: Negative for joint swelling.   Skin: Negative for rash.   Neurological: Negative for headaches.        Past Medical History:   Diagnosis Date   • Hypertension    • Thyroid disease    , No past surgical history on file., No family history on file.,   Social History     Socioeconomic History   • Marital status:    Tobacco Use   • Smoking status: Never Smoker   Substance and Sexual Activity   • Alcohol use: No     E-cigarette/Vaping     E-cigarette/Vaping Substances     E-cigarette/Vaping Devices       ,   Medications Prior to Admission   Medication Sig Dispense Refill Last Dose   • Acetaminophen 325 MG capsule Take  by mouth.   9/28/2022 at Unknown time   • ascorbic acid (VITAMIN C) 1000 MG tablet Take 1,000 mg by mouth.   9/29/2022 at Unknown time   • carvedilol (COREG) 12.5 MG tablet Take 12.5 mg by mouth 2 (Two) Times a Day With Meals.   9/28/2022 at Unknown time   • clidinium-chlordiazePOXIDE (LIBRAX) 5-2.5 MG per capsule Take 1 capsule by mouth 2 (Two) Times a Day.   9/29/2022 at Unknown time   • cloNIDine (CATAPRES) 0.1 MG tablet Take 1 tablet by mouth 2 (Two) Times a Day.   9/29/2022 at Unknown time   • famotidine (PEPCID) 20 MG tablet Take 20 mg by mouth.   9/29/2022 at Unknown time   • levothyroxine (SYNTHROID, LEVOTHROID) 50 MCG tablet TAKE 1 TABLET DAILY EXCEPT FOR MONDAY, WEDNESDAY, AND FRIDAY TAKE 2 TABLETS   9/28/2022 at Unknown time   • PROBIOTIC PRODUCT PO Take  by mouth.   9/28/2022 at Unknown time   • simethicone (MYLICON) 80 MG chewable tablet Chew Every 6 (Six) Hours As Needed.   Past Week at Unknown time   • amLODIPine (NORVASC) 5 MG tablet Take 1 tablet by mouth 2 (Two) Times a Day.   More than a month at Unknown time   • calcium carbonate (OS-TORREY) 1250 (500 Ca) MG chewable tablet Chew 1 tablet Daily.   More than a month at Unknown time   • cholecalciferol (VITAMIN D3) 25 MCG (1000 UT) tablet Take 1,000 Units by mouth Daily.   More than a month at  Unknown time   • HYDROcodone-acetaminophen (NORCO) 5-325 MG per tablet Take 1 tablet by mouth Every 8 (Eight) Hours As Needed for Severe Pain (7-10). 24 tablet 0 More than a month at Unknown time   • levothyroxine (SYNTHROID, LEVOTHROID) 75 MCG tablet Take 75 mcg by mouth Daily.   More than a month at Unknown time   • losartan (COZAAR) 25 MG tablet Take 25 mg by mouth Daily.   More than a month at Unknown time   • mupirocin (BACTROBAN) 2 % ointment Apply  topically 2 (Two) Times a Day. To both feet 30 g 1 More than a month at Unknown time   • pantoprazole (PROTONIX) 20 MG EC tablet Take 20 mg by mouth Daily.   More than a month at Unknown time   • silver sulfadiazine (SILVADENE, SSD) 1 % cream Apply  topically 2 (Two) Times a Day. 50 g 0 More than a month at Unknown time   • UNKNOWN TO PATIENT Additional blood pressure medication      , Scheduled Meds:  amLODIPine, 10 mg, Oral, Daily  aspirin, 81 mg, Oral, Daily  clidinium-chlordiazePOXIDE, 1 capsule, Oral, BID  isosorbide mononitrate, 30 mg, Oral, Q24H  lactobacillus acidophilus, 1 capsule, Oral, Daily  levothyroxine, 50 mcg, Oral, Q AM  pantoprazole, 40 mg, Oral, BID AC  potassium chloride, 20 mEq, Oral, BID With Meals    , Continuous Infusions:   , PRN Meds:  HYDROcodone-acetaminophen  •  nitroglycerin  •  [COMPLETED] Insert peripheral IV **AND** sodium chloride and Allergies:  Penicillins    Objective     Vital Signs  Temp:  [98 °F (36.7 °C)-98.4 °F (36.9 °C)] 98 °F (36.7 °C)  Heart Rate:  [62-67] 67  Resp:  [18] 18  BP: (114-137)/(61-70) 127/70    I/O this shift:  In: -   Out: 400 [Urine:400]  I/O last 3 completed shifts:  In: 360 [P.O.:360]  Out: 3300 [Urine:3300]    Physical Exam  Constitutional:       Comments: frail   HENT:      Head: Normocephalic.      Mouth/Throat:      Mouth: Mucous membranes are moist.   Eyes:      General: No scleral icterus.     Pupils: Pupils are equal, round, and reactive to light.   Cardiovascular:      Rate and Rhythm: Normal  rate.      Heart sounds: Murmur heard.      Comments: 3/6 BLAKE  Pulmonary:      Effort: Pulmonary effort is normal.      Breath sounds: Normal breath sounds.   Abdominal:      General: Abdomen is flat.   Musculoskeletal:         General: Normal range of motion.      Right lower leg: No edema.      Left lower leg: No edema.   Skin:     General: Skin is warm and dry.   Neurological:      General: No focal deficit present.      Mental Status: She is alert.   Psychiatric:         Mood and Affect: Mood normal.         Results Review:    I reviewed the patient's new clinical results.    WBC WBC   Date Value Ref Range Status   10/01/2022 8.15 3.40 - 10.80 10*3/mm3 Final   09/30/2022 6.79 3.40 - 10.80 10*3/mm3 Final   09/29/2022 5.79 3.40 - 10.80 10*3/mm3 Final      HGB Hemoglobin   Date Value Ref Range Status   10/01/2022 12.2 12.0 - 15.9 g/dL Final   09/30/2022 11.2 (L) 12.0 - 15.9 g/dL Final   09/29/2022 11.2 (L) 12.0 - 15.9 g/dL Final      HCT Hematocrit   Date Value Ref Range Status   10/01/2022 37.9 34.0 - 46.6 % Final   09/30/2022 34.4 34.0 - 46.6 % Final   09/29/2022 35.9 34.0 - 46.6 % Final      Platlets No results found for: LABPLAT   MCV MCV   Date Value Ref Range Status   10/01/2022 86.3 79.0 - 97.0 fL Final   09/30/2022 86.2 79.0 - 97.0 fL Final   09/29/2022 88.9 79.0 - 97.0 fL Final          Sodium Sodium   Date Value Ref Range Status   10/01/2022 140 136 - 145 mmol/L Final   09/30/2022 142 136 - 145 mmol/L Final   09/29/2022 139 136 - 145 mmol/L Final      Potassium Potassium   Date Value Ref Range Status   10/01/2022 3.3 (L) 3.5 - 5.2 mmol/L Final   09/30/2022 3.3 (L) 3.5 - 5.2 mmol/L Final   09/29/2022 4.5 3.5 - 5.2 mmol/L Final     Comment:     Slight hemolysis detected by analyzer. Results may be affected.      Chloride Chloride   Date Value Ref Range Status   10/01/2022 98 98 - 107 mmol/L Final   09/30/2022 101 98 - 107 mmol/L Final   09/29/2022 104 98 - 107 mmol/L Final      CO2 CO2   Date Value Ref  Range Status   10/01/2022 29.9 (H) 22.0 - 29.0 mmol/L Final   09/30/2022 27.1 22.0 - 29.0 mmol/L Final   09/29/2022 22.9 22.0 - 29.0 mmol/L Final      BUN BUN   Date Value Ref Range Status   10/01/2022 41 (H) 8 - 23 mg/dL Final   09/30/2022 32 (H) 8 - 23 mg/dL Final   09/29/2022 31 (H) 8 - 23 mg/dL Final      Creatinine Creatinine   Date Value Ref Range Status   10/01/2022 1.53 (H) 0.57 - 1.00 mg/dL Final   09/30/2022 0.99 0.57 - 1.00 mg/dL Final   09/29/2022 1.23 (H) 0.57 - 1.00 mg/dL Final      Calcium Calcium   Date Value Ref Range Status   10/01/2022 10.2 (H) 8.2 - 9.6 mg/dL Final   09/30/2022 10.0 (H) 8.2 - 9.6 mg/dL Final   09/29/2022 10.0 (H) 8.2 - 9.6 mg/dL Final      PO4 No results found for: CAPO4   Albumin Albumin   Date Value Ref Range Status   10/01/2022 4.00 3.50 - 5.20 g/dL Final   09/29/2022 4.00 3.50 - 5.20 g/dL Final      Magnesium Magnesium   Date Value Ref Range Status   09/29/2022 2.4 (H) 1.7 - 2.3 mg/dL Final      Uric Acid No results found for: URICACID         Assessment & Plan       CHF (congestive heart failure) (HCC)      Assessment & Plan  SIENNA/CKD3b-  Reported baseline creatinine of 1.2-1.4.  Due to nephrosclerosis.  Her creatinine had increased to 1.53 today.  Likely related to diuresis with AS as noted.  Agree with holding diuretics for now and monitor.  HTN-  Had recently been started on carvedilol.  Back on norvasc now, previously held due to concern for LE edema.  Will monitor.  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  Will check PTH now.  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  Normal EF.  Elevated BNP at 5135.      I discussed the patients findings and my recommendations with patient    Osmin Bennett MD  10/01/22  14:55 EDT          Electronically signed by Osmin Bennett MD at 10/01/22 1513     Meet Gonsales at 09/30/22 1305      Consult Orders    1.  Inpatient Consult to Advance Care Planning [[id number]] ordered by Ji Wagner MD at 22 0037              left Advance Directive with patient for patient to look over and think about.    Electronically signed by Meet Gonsales at 22 1306     Jazmyne Mei MD at 22 1203      Consult Orders    1. Inpatient Cardiology Consult [[id number]] ordered by Ji Wagner MD at 22 2224               [facility]  Cardiology Consult Note    Patient Identification:  Name: Joycelyn Esqueda  Age: 89 y.o.  Sex: female  :  1930  MRN: [id number]             Requesting Physician: Dr Wagner    Reason for Consultation / Chief Complaint: CHF    History of Present Illness:     Joycelyn Esqueda is a 89-year-old female who is new with our service, with hypertension and thyroid disease.  She presented to [facility] with complaints of shortness of breath worsening over the past week.  She also complains of some chest heaviness and swelling in her legs.  Work-up in ER chest x-ray reveals vascular congestion with cardiomegaly.  Troponin negative 0.010, 0.012, 0.021, BNP 5K she was treated in ER with Lasix 80 mg IV.  ECG in ER poor quality tracing.    Echo in  with [facility] EF 60%, mild LVH, normal LV systolic, diastolic function, mild AV stenosis and regurgitation.    Comorbid cardiac risk factors: Age, hypertension    Past Medical History:  Past Medical History:   Diagnosis Date   • Hypertension    • Thyroid disease      Past Surgical History:  No past surgical history on file.   Allergies:  Allergies   Allergen Reactions   • Penicillins      Home Meds:  Medications Prior to Admission   Medication Sig Dispense Refill Last Dose   • Acetaminophen 325 MG capsule Take  by mouth.   2022 at Unknown time   • ascorbic acid (VITAMIN C) 1000 MG tablet Take 1,000 mg by mouth.   2022 at Unknown time   • carvedilol (COREG) 12.5 MG tablet Take 12.5 mg by  mouth 2 (Two) Times a Day With Meals.   9/28/2022 at Unknown time   • clidinium-chlordiazePOXIDE (LIBRAX) 5-2.5 MG per capsule Take 1 capsule by mouth 2 (Two) Times a Day.   9/29/2022 at Unknown time   • cloNIDine (CATAPRES) 0.1 MG tablet Take 1 tablet by mouth 2 (Two) Times a Day.   9/29/2022 at Unknown time   • famotidine (PEPCID) 20 MG tablet Take 20 mg by mouth.   9/29/2022 at Unknown time   • levothyroxine (SYNTHROID, LEVOTHROID) 50 MCG tablet TAKE 1 TABLET DAILY EXCEPT FOR MONDAY, WEDNESDAY, AND FRIDAY TAKE 2 TABLETS   9/28/2022 at Unknown time   • PROBIOTIC PRODUCT PO Take  by mouth.   9/28/2022 at Unknown time   • simethicone (MYLICON) 80 MG chewable tablet Chew Every 6 (Six) Hours As Needed.   Past Week at Unknown time   • amLODIPine (NORVASC) 5 MG tablet Take 1 tablet by mouth 2 (Two) Times a Day.   More than a month at Unknown time   • calcium carbonate (OS-TORREY) 1250 (500 Ca) MG chewable tablet Chew 1 tablet Daily.   More than a month at Unknown time   • cholecalciferol (VITAMIN D3) 25 MCG (1000 UT) tablet Take 1,000 Units by mouth Daily.   More than a month at Unknown time   • HYDROcodone-acetaminophen (NORCO) 5-325 MG per tablet Take 1 tablet by mouth Every 8 (Eight) Hours As Needed for Severe Pain (7-10). 24 tablet 0 More than a month at Unknown time   • levothyroxine (SYNTHROID, LEVOTHROID) 75 MCG tablet Take 75 mcg by mouth Daily.   More than a month at Unknown time   • losartan (COZAAR) 25 MG tablet Take 25 mg by mouth Daily.   More than a month at Unknown time   • mupirocin (BACTROBAN) 2 % ointment Apply  topically 2 (Two) Times a Day. To both feet 30 g 1 More than a month at Unknown time   • pantoprazole (PROTONIX) 20 MG EC tablet Take 20 mg by mouth Daily.   More than a month at Unknown time   • silver sulfadiazine (SILVADENE, SSD) 1 % cream Apply  topically 2 (Two) Times a Day. 50 g 0 More than a month at Unknown time   • UNKNOWN TO PATIENT Additional blood pressure medication        Current  "Meds:   Current Facility-Administered Medications   Medication Dose Route Frequency Provider Last Rate Last Admin   • amLODIPine (NORVASC) tablet 10 mg  10 mg Oral Daily Ji Wagner MD   10 mg at 09/30/22 1209   • aspirin chewable tablet 81 mg  81 mg Oral Daily Ji Wagner MD   81 mg at 09/30/22 1144   • clidinium-chlordiazePOXIDE (LIBRAX) 5-2.5 MG per capsule 1 capsule  1 capsule Oral BID Ji Wagner MD   1 capsule at 09/30/22 1209   • furosemide (LASIX) injection 40 mg  40 mg Intravenous BID Ji Wagner MD   40 mg at 09/30/22 1144   • HYDROcodone-acetaminophen (NORCO) 5-325 MG per tablet 1 tablet  1 tablet Oral Q8H PRN Ji Wagner MD       • lactobacillus acidophilus (RISAQUAD) capsule 1 capsule  1 capsule Oral Daily Ji Wagner MD       • levothyroxine (SYNTHROID, LEVOTHROID) tablet 75 mcg  75 mcg Oral Q AM Ji Wagner MD   75 mcg at 09/30/22 0644   • nitroglycerin (NITROSTAT) ointment 1 inch  1 inch Topical Q6H Ji Wagner MD   1 inch at 09/30/22 0644   • pantoprazole (PROTONIX) EC tablet 40 mg  40 mg Oral BID AC Ji Wagner MD       • potassium chloride (K-DUR,KLOR-CON) ER tablet 20 mEq  20 mEq Oral BID With Meals Ji Wagner MD   20 mEq at 09/30/22 1144   • sodium chloride 0.9 % flush 10 mL  10 mL Intravenous PRN Osmin Johansen MD           Social History:   Social History     Tobacco Use   • Smoking status: Never Smoker   • Smokeless tobacco: Not on file   Substance Use Topics   • Alcohol use: No      Family History:  No family history on file.     Review of Systems  Constitutional: No wt loss, fever   Gastrointestinal: No nausea , abdominal pain  Behavioral/Psych: No insomnia or anxiety   Cardiovascular ----positive for sob. All other systems reviewed and are negative              Objective Physical Exam  /54   Pulse 60   Temp 97.3 °F (36.3 °C) (Oral)   Resp 18   Ht 139.7 cm (55\")   Wt 45.8 kg (101 lb)   SpO2 93%   BMI 23.47 kg/m²     General appearance: No acute changes "   Eyes: Sclerae conjunctivae normal, pupils reactive   HENT: Atraumatic; oropharynx clear with moist mucous membranes and no mucosal ulcerations;  Neck: Trachea midline; NECK, supple, no thyromegaly or lymphadenopathy   Lungs: Normal size and shape, normal breath sounds, equal distribution of air, no rales and rhonchi   CV: S1-S2 regular, sy murmurs, no rub, no gallop   Abdomen: Soft, nontender; no masses , no abnormal abdominal sounds   Extremities: No deformity , normal color , no peripheral edema   Skin: Normal temperature, turgor and texture; no rash, ulcers  Psych: Appropriate affect, alert and oriented to person, place and time                   Cardiographics  ECG:   SR, nonspecific T wave    Telemetry:  Sinus rhythm    Echocardiogram:   2014  Conclusions:   The left ventricular chamber size is normal.   Mild concentric left ventricular hypertrophy is observed.   There is normal left ventricular systolic function.   The EF 4ch was calculated at 60%.   Normal left ventricular diastolic filling is observed.   The left atrium is mildly enlarged.   The right atrial cavity size is normal.   There is mild aortic regurgitation.   There is mild aortic stenosis.   The mean gradient of the aortic valve is 13 mmHg.   The peak instantaneous gradient of the aortic valve is 19.9 mmHg.   The aortic valve area, by VTI's, is calculated at 1.32 cm2.   There is mild tricuspid regurgitation.     Imaging  Chest X-ray:   IMPRESSION:  Cardiac enlargement with prominence of the pulmonary  interstitial favored represent pulmonary edema. There may also be a  small left pleural effusion.     This report was finalized on 9/29/2022 4:32 PM by Dr. Abilio Glover M.D.       Lab Review   Results from last 7 days   Lab Units 09/30/22  0418 09/29/22  1904 09/29/22  1558   TROPONIN T ng/mL 0.021 0.012 0.010     Results from last 7 days   Lab Units 09/29/22  1558   MAGNESIUM mg/dL 2.4*     Results from last 7 days   Lab Units 09/30/22  0418    SODIUM mmol/L 142   POTASSIUM mmol/L 3.3*   BUN mg/dL 32*   CREATININE mg/dL 0.99   CALCIUM mg/dL 10.0*     @[id number]@  Results from last 7 days   Lab Units 09/30/22  0418 09/29/22  1558   WBC 10*3/mm3 6.79 5.79   HEMOGLOBIN g/dL 11.2* 11.2*   HEMATOCRIT % 34.4 35.9   PLATELETS 10*3/mm3 166 160     Results from last 7 days   Lab Units 09/29/22  1558   INR  1.24*   APTT seconds 33.4       Intake/Output Summary (Last 24 hours) at 9/30/2022 1215  Last data filed at 9/30/2022 0908  Gross per 24 hour   Intake 140 ml   Output 1700 ml   Net -1560 ml           Assessment:  Acute on chronic CHF  Chest pain  Hypertension  Hypothyroidism  GERD    Recommendations / Plan:     Joycelyn Esqueda is a 89-year-old female who is new with our service.  She is admitted for acute on chronic CHF and chest pain.  She was treated in ER with IV Lasix.  Troponin 0.010, 0.012, 0.021, BNP 5K.  She denies any chest pain.  Shortness of breath is improving.  She is on room air, denies any chest pain.     Check echo, continue IV Lasix.  Continue aspirin. May need stress test. Proceed with echo for now.     Labs/tests ordered for am: Troponin, ECG      Caity Prakash, APRN  9/30/2022, 12:05 EDT    Patient personally interviewed and above subjective findings personally confirmed during a face to face contact with patient today  All findings of physical examination confirmed  All pertinent and performed labs, cardiac procedures ,  radiographs of the last 24 hours personally reviewed  Impression and plans discussed/elaborated and implemented jointly as described above     Jazmyne Mei MD    Echocardiogram has revealed severe aortic stenosis  We will discuss with the family to be treated conservatively or TAVR  Jazmyne Mei MD        EMR Dragon/Transcription:   Dictated utilizing Dragon dictation      Electronically signed by Jazmyne Mei MD at 09/30/22 2355

## 2022-10-03 NOTE — PLAN OF CARE
Goal Outcome Evaluation:      Diuretic in Use:   Response to Diuretics (Output greater than intake):   Daily Weight (up or down):   O2 Requirements:   Functional Status (Activity level, tolerance and respiratory symptoms):   Discharge Plans:          Outcome Evaluation: VSS. O2 SAT STABLE ON RA. RESTED AT SHORT NTERVALS IN BETWEEN CARE. UNEVENTFUL NIGHT.

## 2022-10-03 NOTE — PROGRESS NOTES
[facility] Progress Note      Patient Name: Joycelyn Esqueda  : 1930  MRN: [id number]  Primary Care Physician:  Giles Wheeler MD  Date of admission: 2022    Subjective     Interval History:   The patient was seen and examined today for follow-up on acute kidney injury on top of chronic kidney disease.  Feeling fine.  Having her breakfast.  Continues to feel fatigued.  Denies any chest pain or shortness of breath.    Review of Systems:   As noted above    Objective     Vitals:   Temp:  [97.3 °F (36.3 °C)-98 °F (36.7 °C)] 97.5 °F (36.4 °C)  Heart Rate:  [64-71] 64  Resp:  [17-20] 18  BP: (136-147)/(64-72) 139/64    Intake/Output Summary (Last 24 hours) at 10/3/2022 1005  Last data filed at 10/3/2022 0645  Gross per 24 hour   Intake 200 ml   Output 350 ml   Net -150 ml       Physical Exam:    General Appearance: alert, oriented x 3, no acute distress   Skin: warm and dry  HEENT: oral mucosa normal, nonicteric sclera  Neck: supple, no JVD  Lungs: Bilateral basal crackles  Heart: RRR, normal S1 and S2.  Systolic murmur  Abdomen: soft, nontender, nondistended  : no palpable bladder  Extremities: no edema, cyanosis or clubbing  Neuro: normal speech and mental status     Scheduled Meds:     amLODIPine, 10 mg, Oral, Daily  aspirin, 81 mg, Oral, Daily  clidinium-chlordiazePOXIDE, 1 capsule, Oral, BID  famotidine, 20 mg, Oral, Daily  isosorbide mononitrate, 30 mg, Oral, Q24H  lactobacillus acidophilus, 1 capsule, Oral, Daily  levothyroxine, 50 mcg, Oral, Q AM  potassium chloride, 20 mEq, Oral, BID With Meals      IV Meds:        Results Reviewed:   I have personally reviewed the results from the time of this admission to 10/3/2022 10:05 EDT     Results from last 7 days   Lab Units 10/03/22  0402 10/02/22  0424 10/01/22  0416 22  0418 22  1558   SODIUM mmol/L 140 142 140   < > 139   POTASSIUM mmol/L 4.4 4.0 3.3*   < > 4.5   CHLORIDE mmol/L 100 97* 98   < > 104    CO2 mmol/L 30.3* 31.0* 29.9*   < > 22.9   BUN mg/dL 50* 50* 41*   < > 31*   CREATININE mg/dL 1.62* 1.88* 1.53*   < > 1.23*   CALCIUM mg/dL 9.9* 10.1* 10.2*   < > 10.0*   BILIRUBIN mg/dL  --   --  0.7  --  0.7   ALK PHOS U/L  --   --  87  --  92   ALT (SGPT) U/L  --   --  16  --  19   AST (SGOT) U/L  --   --  18  --  27   GLUCOSE mg/dL 116* 129* 117*   < > 110*    < > = values in this interval not displayed.       Estimated Creatinine Clearance: 14.1 mL/min (A) (by C-G formula based on SCr of 1.62 mg/dL (H)).    Results from last 7 days   Lab Units 09/29/22  1558   MAGNESIUM mg/dL 2.4*             Results from last 7 days   Lab Units 10/03/22  0402 10/02/22  0424 10/01/22  0417 09/30/22  0418 09/29/22  1558   WBC 10*3/mm3 8.03 8.15 8.15 6.79 5.79   HEMOGLOBIN g/dL 11.4* 12.2 12.2 11.2* 11.2*   PLATELETS 10*3/mm3 162 171 177 166 160       Results from last 7 days   Lab Units 09/29/22  1558   INR  1.24*       Assessment / Plan     ASSESSMENT:  Acute kidney injury on top of chronic kidney disease stage IIIb with baseline creatinine between 1.2 and 1.4.  Etiology likely secondary to hypertensive nephrosclerosis and cardiorenal syndrome secondary to aortic stenosis.  Creatinine was up to 1.88 with diuresis.  Diuretics were stopped and creatinine trended down.  Hypertension: Blood pressures controlled  Severe aortic stenosis followed by cardiology.  Hyperparathyroidism: Declined sestamibi scan in office.  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  We will restart oral  diuretic  Tomorrow  Await cardiology input  She continues to be very frail may need long term placement       Thank you for involving us in the care of Joycelyn Esqueda.  Please feel free to call with any questions.    Wily Aburto MD  10/03/22  10:05 EDT    [facility]  [phone]      Much of this encounter note is an electronic  transcription/translation of spoken language to printed text. The electronic translation of spoken language may permit erroneous, or at times, nonsensical words or phrases to be inadvertently transcribed; Although I have reviewed the note for such errors, some may still exist.

## 2022-10-03 NOTE — PROGRESS NOTES
"Daily progress note    Primary care physician      Chief complaint  Doing better with no new complaints and making slow progress.  Patient agreeable to go to subacute rehab.    History of present illness  89-year-old white female with history of hypertension hypothyroidism osteoarthritis and gastroesophageal disease presented to [facility] emergency room with shortness of breath for last 1 week with chest heaviness and leg swelling.  Patient denies any fever cough abdominal pain nausea vomiting diarrhea.  Patient never been to this hospital before and followed by [facility] geriatric service.  Patient followed commands and answer all question appropriately and no her medications reviewed and refusing to take Coreg and losartan which is her home medications.     REVIEW OF SYSTEMS  Per history and physical     PHYSICAL EXAM  Blood pressure 128/65, pulse 64, temperature 98.2 °F (36.8 °C), temperature source Oral, resp. rate 17, height 139.7 cm (55\"), weight 40.4 kg (89 lb), SpO2 97 %.    GENERAL: Awake and alert, very pleasant nontoxic-appearing  HEENT:  Unremarkable  NECK:  Supple  CV: regular rhythm, regular rate  RESPIRATORY: normal effort, significant increase in work of breathing with minimal exertion with some rales in bases  ABDOMEN: soft nontender bowel sounds positive  MUSCULOSKELETAL: no deformity, trace pedal edema bilaterally   NEURO: alert, moves all extremities, follows commands  SKIN: warm, dry     LAB RESULTS  Lab Results (last 24 hours)     Procedure Component Value Units Date/Time    Basic Metabolic Panel [[id number]]  (Abnormal) Collected: 10/03/22 0402    Specimen: Blood Updated: 10/03/22 0501     Glucose 116 mg/dL      BUN 50 mg/dL      Creatinine 1.62 mg/dL      Sodium 140 mmol/L      Potassium 4.4 mmol/L      Chloride 100 mmol/L      CO2 30.3 mmol/L      Calcium 9.9 mg/dL      BUN/Creatinine Ratio 30.9     Anion Gap 9.7 mmol/L      eGFR 29.7 mL/min/1.73      " Comment: National Kidney Foundation and American Society of Nephrology (ASN) Task Force recommended calculation based on the Chronic Kidney Disease Epidemiology Collaboration (CKD-EPI) equation refit without adjustment for race.       Narrative:      GFR Normal >60  Chronic Kidney Disease <60  Kidney Failure <15      CBC & Differential [[id number]]  (Abnormal) Collected: 10/03/22 0402    Specimen: Blood Updated: 10/03/22 0451    Narrative:      The following orders were created for panel order CBC & Differential.  Procedure                               Abnormality         Status                     ---------                               -----------         ------                     CBC Auto Differential[[id number]]        Abnormal            Final result                 Please view results for these tests on the individual orders.    CBC Auto Differential [[id number]]  (Abnormal) Collected: 10/03/22 0402    Specimen: Blood Updated: 10/03/22 0451     WBC 8.03 10*3/mm3      RBC 4.07 10*6/mm3      Hemoglobin 11.4 g/dL      Hematocrit 35.7 %      MCV 87.7 fL      MCH 28.0 pg      MCHC 31.9 g/dL      RDW 14.1 %      RDW-SD 45.7 fl      MPV 11.1 fL      Platelets 162 10*3/mm3      Neutrophil % 71.8 %      Lymphocyte % 14.2 %      Monocyte % 10.2 %      Eosinophil % 3.2 %      Basophil % 0.4 %      Immature Grans % 0.2 %      Neutrophils, Absolute 5.76 10*3/mm3      Lymphocytes, Absolute 1.14 10*3/mm3      Monocytes, Absolute 0.82 10*3/mm3      Eosinophils, Absolute 0.26 10*3/mm3      Basophils, Absolute 0.03 10*3/mm3      Immature Grans, Absolute 0.02 10*3/mm3      nRBC 0.0 /100 WBC         Imaging Results (Last 24 Hours)     ** No results found for the last 24 hours. **        ECG 12 Lead  Component   Ref Range & Units 1 d ago    QT Interval   ms 524    Resulting Agency  ECG             HEART RATE= 57  bpm  RR Interval= 1053  ms  UT Interval=   ms  P Horizontal Axis=   deg  P Front Axis=   deg  QRSD Interval= 154   ms  QT Interval= 524  ms  QRS Axis= -57  deg  T Wave Axis= 102  deg  - ABNORMAL ECG -  Atrial flutter  LVH with IVCD, LAD and secondary repol abnrm  Probable inferior infarct, recent  Prolonged QT interval  No Prior Tracing for Comparison             Current Facility-Administered Medications:   •  amLODIPine (NORVASC) tablet 10 mg, 10 mg, Oral, Daily, Ji Wagner MD, 10 mg at 10/03/22 0928  •  aspirin chewable tablet 81 mg, 81 mg, Oral, Daily, Ji Wagner MD, 81 mg at 10/03/22 0928  •  clidinium-chlordiazePOXIDE (LIBRAX) 5-2.5 MG per capsule 1 capsule, 1 capsule, Oral, BID, Ji Wagner MD, 1 capsule at 10/03/22 0928  •  famotidine (PEPCID) tablet 20 mg, 20 mg, Oral, Daily, Ji Wagner MD, 20 mg at 10/03/22 0928  •  HYDROcodone-acetaminophen (NORCO) 5-325 MG per tablet 1 tablet, 1 tablet, Oral, Q8H PRN, Ji Wagner MD  •  isosorbide mononitrate (IMDUR) 24 hr tablet 30 mg, 30 mg, Oral, Q24H, Ji Wagner MD, 30 mg at 10/03/22 0928  •  lactobacillus acidophilus (RISAQUAD) capsule 1 capsule, 1 capsule, Oral, Daily, Ji Wagner MD, 1 capsule at 10/03/22 0928  •  levothyroxine (SYNTHROID, LEVOTHROID) tablet 50 mcg, 50 mcg, Oral, Q AM, Ji Wagner MD, 50 mcg at 10/03/22 0651  •  nitroglycerin (NITROSTAT) SL tablet 0.4 mg, 0.4 mg, Sublingual, Q5 Min PRN, Ji Wagner MD  •  potassium chloride (K-DUR,KLOR-CON) ER tablet 20 mEq, 20 mEq, Oral, BID With Meals, Osmin Bennett MD, 20 mEq at 10/03/22 0928  •  [COMPLETED] Insert peripheral IV, , , Once **AND** sodium chloride 0.9 % flush 10 mL, 10 mL, Intravenous, PRN, Osmin Johansen MD     ASSESSMENT  Acute on chronic diastolic congestive heart failure  Acute kidney injury  Severe aortic stenosis  Hypokalemia resolved  Hypertension  Hypothyroidism  Osteoarthritis  Chronic kidney disease stage III  Gastroesophageal reflux disease    PLAN  CPM  Discontinue IVF  Continue to hold diuretics for 1 more day and resume in a.m.  Strict I's and O's and daily  weight  Nephrology consult appreciated  Cardiology to follow patient  Adjust home medications  Stress ulcer DVT prophylaxis  Supportive care  PT/OT  Discussed with nursing staff and family  Subacute rehab once bed available    SOURAV DUTTON MD

## 2022-10-04 LAB
ANION GAP SERPL CALCULATED.3IONS-SCNC: 9.6 MMOL/L (ref 5–15)
BUN SERPL-MCNC: 44 MG/DL (ref 8–23)
BUN/CREAT SERPL: 32.8 (ref 7–25)
CALCIUM SPEC-SCNC: 9.9 MG/DL (ref 8.2–9.6)
CHLORIDE SERPL-SCNC: 101 MMOL/L (ref 98–107)
CO2 SERPL-SCNC: 27.4 MMOL/L (ref 22–29)
CREAT SERPL-MCNC: 1.34 MG/DL (ref 0.57–1)
EGFRCR SERPLBLD CKD-EPI 2021: 37.3 ML/MIN/1.73
GLUCOSE SERPL-MCNC: 96 MG/DL (ref 65–99)
POTASSIUM SERPL-SCNC: 4.4 MMOL/L (ref 3.5–5.2)
QT INTERVAL: 471 MS
QT INTERVAL: 472 MS
SODIUM SERPL-SCNC: 138 MMOL/L (ref 136–145)

## 2022-10-04 PROCEDURE — 97535 SELF CARE MNGMENT TRAINING: CPT

## 2022-10-04 PROCEDURE — 97166 OT EVAL MOD COMPLEX 45 MIN: CPT

## 2022-10-04 PROCEDURE — 99232 SBSQ HOSP IP/OBS MODERATE 35: CPT | Performed by: INTERNAL MEDICINE

## 2022-10-04 PROCEDURE — 80048 BASIC METABOLIC PNL TOTAL CA: CPT | Performed by: NURSE PRACTITIONER

## 2022-10-04 RX ADMIN — CHLORDIAZEPOXIDE HYDROCHLORIDE AND CLIDINIUM BROMIDE 1 CAPSULE: 5; 2.5 CAPSULE ORAL at 20:16

## 2022-10-04 RX ADMIN — FAMOTIDINE 20 MG: 20 TABLET ORAL at 09:25

## 2022-10-04 RX ADMIN — AMLODIPINE BESYLATE 10 MG: 10 TABLET ORAL at 09:25

## 2022-10-04 RX ADMIN — ASPIRIN 81 MG: 81 TABLET, CHEWABLE ORAL at 09:25

## 2022-10-04 RX ADMIN — POTASSIUM CHLORIDE 20 MEQ: 750 TABLET, EXTENDED RELEASE ORAL at 09:25

## 2022-10-04 RX ADMIN — ISOSORBIDE MONONITRATE 30 MG: 30 TABLET, EXTENDED RELEASE ORAL at 09:25

## 2022-10-04 RX ADMIN — LEVOTHYROXINE SODIUM 50 MCG: 0.05 TABLET ORAL at 06:36

## 2022-10-04 RX ADMIN — CHLORDIAZEPOXIDE HYDROCHLORIDE AND CLIDINIUM BROMIDE 1 CAPSULE: 5; 2.5 CAPSULE ORAL at 09:25

## 2022-10-04 RX ADMIN — Medication 1 CAPSULE: at 09:25

## 2022-10-04 RX ADMIN — POTASSIUM CHLORIDE 20 MEQ: 750 TABLET, EXTENDED RELEASE ORAL at 17:18

## 2022-10-04 NOTE — THERAPY EVALUATION
Patient Name: Joycelyn Esqueda  : 1930    MRN: [id number]                              Today's Date: 10/4/2022       Admit Date: 2022    Visit Dx:     ICD-10-CM ICD-9-CM   1. Combined systolic and diastolic congestive heart failure, unspecified HF chronicity (HCC)  I50.40 428.40     Patient Active Problem List   Diagnosis   • CHF (congestive heart failure) (HCC)   • Combined systolic and diastolic congestive heart failure, unspecified HF chronicity (HCC)     Past Medical History:   Diagnosis Date   • Hypertension    • Thyroid disease      No past surgical history on file.   General Information     Row Name 10/04/22 1024          OT Time and Intention    Document Type evaluation  -KA     Mode of Treatment individual therapy;occupational therapy  -     Row Name 10/04/22 1024          General Information    Patient Profile Reviewed yes  -KA     Prior Level of Function --  pt reports assistance with ADLs from caregiver 6 hrs/day at home. Reports indep with use of rollator. Reports she can go to BR and complete toilet indep when caregiver is not there  -     Existing Precautions/Restrictions fall  -     Row Name 10/04/22 1024          Living Environment    People in Home alone  Has assistance 6 hrs/day  -     Row Name 10/04/22 1024          Home Main Entrance    Number of Stairs, Main Entrance none  -KA     Row Name 10/04/22 1024          Stairs Within Home, Primary    Number of Stairs, Within Home, Primary none  -     Row Name 10/04/22 1024          Cognition    Orientation Status (Cognition) oriented to;person;place;time  -     Row Name 10/04/22 1024          Safety Issues, Functional Mobility    Safety Issues Affecting Function (Mobility) positioning of assistive device;problem-solving  -     Impairments Affecting Function (Mobility) balance;endurance/activity tolerance;strength;postural/trunk control  -     Comment, Safety Issues/Impairments (Mobility) nonskid socks and gait belt donned  for safety  -           User Key  (r) = Recorded By, (t) = Taken By, (c) = Cosigned By    Initials Name Provider Type    Sakina Hi OT Occupational Therapist                 Mobility/ADL's     Row Name 10/04/22 1028          Bed Mobility    Bed Mobility supine-sit;sit-supine  -     Supine-Sit [place] (Bed Mobility) standby assist  -     Comment, (Bed Mobility) up in chair at the end of session  -     Row Name 10/04/22 1028          Transfers    Transfers toilet transfer  -     Bed-Chair [place] (Transfers) minimum assist (75% patient effort);verbal cues  -     Assistive Device (Bed-Chair Transfers) walker, 4-wheeled  -     Sit-Stand [place] (Transfers) verbal cues;standby assist  -     [place] Level (Toilet Transfer) minimum assist (75% patient effort)  -     Assistive Device (Toilet Transfer) commode, bedside without drop arms  -     Row Name 10/04/22 1028          Sit-Stand Transfer    Assistive Device (Sit-Stand Transfers) walker, 4-wheeled  -     Row Name 10/04/22 1028          Functional Mobility    Functional Mobility- Ind. Level minimum assist (75% patient effort)  -     Functional Mobility- Device walker, 4-wheeled  -     Functional Mobility-Distance (Feet) --  Ambulated around bed to chair with rollator  -     Row Name 10/04/22 1028          Activities of Daily Living    BADL Assessment/Intervention lower body dressing;toileting;grooming  -     Row Name 10/04/22 1028          Lower Body Dressing Assessment/Training    [place] Level (Lower Body Dressing) lower body dressing skills;don;doff;socks;moderate assist (50% patient effort)  -     Position (Lower Body Dressing) sitting up in bed  -     Comment, (Lower Body Dressing) Able to adjust sock in bed  -     Row Name 10/04/22 1028          Toileting Assessment/Training    [place] Level (Toileting) toileting skills;maximum assist (25% patient effort);change pad/brief;adjust/manage  clothing  -     Position (Toileting) supported sitting;supported standing  -     Comment, (Toileting) Able to perform marleen care standing with CGA . Required max A for brief management  -     Row Name 10/04/22 1028          Grooming Assessment/Training    [place] Level (Grooming) grooming skills;wash face, hands;standby assist  -     Position (Grooming) supported sitting  -           User Key  (r) = Recorded By, (t) = Taken By, (c) = Cosigned By    Initials Name Provider Type    Sakina Hi OT Occupational Therapist               Obj/Interventions     Row Name 10/04/22 1032          Sensory Assessment (Somatosensory)    Sensory Assessment (Somatosensory) not tested  -     Row Name 10/04/22 1032          Range of Motion Comprehensive    General Range of Motion bilateral upper extremity ROM WFL  -     Row Name 10/04/22 1032          Strength Comprehensive (MMT)    General Manual Muscle Testing (MMT) Assessment upper extremity strength deficits identified  -     Row Name 10/04/22 1032          Motor Skills    Motor Skills functional endurance  -     Functional Endurance fatigues quickly with ADLs and functional mobility  -     Row Name 10/04/22 1032          Balance    Balance Assessment standing static balance;standing dynamic balance;sitting static balance;sitting dynamic balance  -     Static Sitting Balance standby assist  -     Dynamic Sitting Balance contact guard  -     Static Standing Balance contact guard  -     Dynamic Standing Balance minimal assist  -     Position/Device Used, Standing Balance walker, 4-wheeled  -     Balance Interventions sit to stand;sitting;standing;occupation based/functional task  -           User Key  (r) = Recorded By, (t) = Taken By, (c) = Cosigned By    Initials Name Provider Type    Sakina Hi OT Occupational Therapist               Goals/Plan     Row Name 10/04/22 1039          Bed Mobility Goal 1 (OT)    Activity/Assistive  Device (Bed Mobility Goal 1, OT) bed mobility activities, all  -KA     [place] Level/Cues Needed (Bed Mobility Goal 1, OT) modified independence  -KA     Time Frame (Bed Mobility Goal 1, OT) 2 weeks  -KA     Progress/Outcomes (Bed Mobility Goal 1, OT) goal ongoing  -     Row Name 10/04/22 1039          Transfer Goal 1 (OT)    Activity/Assistive Device (Transfer Goal 1, OT) transfers, all  -KA     [place] Level/Cues Needed (Transfer Goal 1, OT) standby assist  -KA     Time Frame (Transfer Goal 1, OT) 2 weeks  -KA     Progress/Outcome (Transfer Goal 1, OT) goal ongoing  -     Row Name 10/04/22 1039          Bathing Goal 1 (OT)    Activity/Device (Bathing Goal 1, OT) bathing skills, all  -KA     [place] Level/Cues Needed (Bathing Goal 1, OT) minimum assist (75% or more patient effort)  -KA     Time Frame (Bathing Goal 1, OT) 2 weeks  -KA     Progress/Outcomes (Bathing Goal 1, OT) goal ongoing  -     Row Name 10/04/22 1039          Dressing Goal 1 (OT)    Activity/Device (Dressing Goal 1, OT) dressing skills, all  -KA     [place]/Cues Needed (Dressing Goal 1, OT) minimum assist (75% or more patient effort)  -KA     Time Frame (Dressing Goal 1, OT) 2 weeks  -KA     Progress/Outcome (Dressing Goal 1, OT) goal ongoing  -     Row Name 10/04/22 1039          Toileting Goal 1 (OT)    Activity/Device (Toileting Goal 1, OT) toileting skills, all  -KA     [place] Level/Cues Needed (Toileting Goal 1, OT) minimum assist (75% or more patient effort)  -KA     Time Frame (Toileting Goal 1, OT) 2 weeks  -KA     Progress/Outcome (Toileting Goal 1, OT) goal ongoing  -     Row Name 10/04/22 1039          Grooming Goal 1 (OT)    Activity/Device (Grooming Goal 1, OT) grooming skills, all  -KA     [place] (Grooming Goal 1, OT) modified independence  -KA     Time Frame (Grooming Goal 1, OT) 2 weeks  -KA     Progress/Outcome (Grooming Goal 1, OT) goal ongoing  -     Row Name 10/04/22 1039           Therapy Assessment/Plan (OT)    Planned Therapy Interventions (OT) activity tolerance training;functional balance retraining;occupation/activity based interventions;ROM/therapeutic exercise;strengthening exercise;transfer/mobility retraining;patient/caregiver education/training;BADL retraining  -RAPHAEL           User Key  (r) = Recorded By, (t) = Taken By, (c) = Cosigned By    Initials Name Provider Type    Sakina Hi, NANCY Occupational Therapist               Clinical Impression     Row Name 10/04/22 1034          Pain Assessment    Pretreatment Pain Rating 0/10 - no pain  -RAPHAEL     Posttreatment Pain Rating 0/10 - no pain  -RAPHAEL     Row Name 10/04/22 1034          Plan of Care Review    Plan of Care Reviewed With patient  -     Outcome Evaluation Pt seen for OT eval this AM. Pt admitted with acute on chronic congestive heart failure. PMHx includes HTN, OA, CKD, and CAD. Pt reports living alone in a single level home with a caregiver for 6hrs/day that assists her with ADLs and home management. She reports being indep with her rollator for functional mobility. Pt states she is able to ambulate to bathroom and complete toileting when caregiver is not there. Pt reports no DANIELA and has a shower chair and grab bars in bathroom. Today pt transitioned from supine to sit with SBA and transferred to toilet with min A and cues for walker placement. Pt able to complete marleen care standing with CGA for balance. Required max A for management of brief and mod A for socks. Pt required seated rest break. Able to perform functional mobility around bed to chair with increased time and min A for managing rollator at times. Pt presents with decreased strength, balance, endurance activity tolerance, functional mobility and ADL performance. Pt to benefit from skilled OT to address deficits and maximize independence with ADLs. Rec SNF at [facility].  -RAPHAEL     Row Name 10/04/22 1034          Therapy Assessment/Plan (OT)    Therapy Frequency (OT) 5  times/wk  -     Row Name 10/04/22 1034          Therapy Plan Review/Discharge Plan (OT)    Anticipated Discharge Disposition (OT) skilled nursing facility  -     Row Name 10/04/22 1034          Vital Signs    Pre Patient Position Supine  -KA     Intra Patient Position Standing  -KA     Post Patient Position Sitting  -     Row Name 10/04/22 1034          Positioning and Restraints    Pre-Treatment Position in bed  -KA     Post Treatment Position chair  -KA     In Chair notified nsg;reclined;call light within reach;encouraged to call for assist;exit alarm on;with nsg  -           User Key  (r) = Recorded By, (t) = Taken By, (c) = Cosigned By    Initials Name Provider Type    Sakina Hi OT Occupational Therapist               Outcome Measures     Row Name 10/04/22 1040          How much help from another is currently needed...    Putting on and taking off regular lower body clothing? 2  -KA     Bathing (including washing, rinsing, and drying) 2  -KA     Toileting (which includes using toilet bed pan or urinal) 2  -KA     Putting on and taking off regular upper body clothing 3  -KA     Taking care of personal grooming (such as brushing teeth) 3  -KA     Eating meals 3  -KA     AM-PAC 6 Clicks Score (OT) 15  -KA     Row Name 10/04/22 1040          Functional Assessment    Outcome Measure Options AM-PAC 6 Clicks Daily Activity (OT)  -           User Key  (r) = Recorded By, (t) = Taken By, (c) = Cosigned By    Initials Name Provider Type    Sakina Hi OT Occupational Therapist                Occupational Therapy Education                 Title: PT OT SLP Therapies (Done)     Topic: Occupational Therapy (Done)     Point: ADL training (Done)     Description:   Instruct learner(s) on proper safety adaptation and remediation techniques during self care or transfers.   Instruct in proper use of assistive devices.              Learning Progress Summary           Patient Acceptance, KACIE GASPAR,EVAN by RAPHAEL at  10/4/2022 1040                               User Key     Initials Effective Dates Name Provider Type Discipline     09/22/22 -  Sakina Lawson OT Occupational Therapist OT              OT Recommendation and Plan  Planned Therapy Interventions (OT): activity tolerance training, functional balance retraining, occupation/activity based interventions, ROM/therapeutic exercise, strengthening exercise, transfer/mobility retraining, patient/caregiver education/training, BADL retraining  Therapy Frequency (OT): 5 times/wk  Plan of Care Review  Plan of Care Reviewed With: patient  Outcome Evaluation: Pt seen for OT eval this AM. Pt admitted with acute on chronic congestive heart failure. PMHx includes HTN, OA, CKD, and CAD. Pt reports living alone in a single level home with a caregiver for 6hrs/day that assists her with ADLs and home management. She reports being indep with her rollator for functional mobility. Pt states she is able to ambulate to bathroom and complete toileting when caregiver is not there. Pt reports no ADNIELA and has a shower chair and grab bars in bathroom. Today pt transitioned from supine to sit with SBA and transferred to toilet with min A and cues for walker placement. Pt able to complete marleen care standing with CGA for balance. Required max A for management of brief and mod A for socks. Pt required seated rest break. Able to perform functional mobility around bed to chair with increased time and min A for managing rollator at times. Pt presents with decreased strength, balance, endurance activity tolerance, functional mobility and ADL performance. Pt to benefit from skilled OT to address deficits and maximize independence with ADLs. Rec SNF at [facility].     Time Calculation:    Time Calculation- OT     Row Name 10/04/22 1040             Time Calculation- OT    OT Start Time 0902  -KA      OT Stop Time 0930  -KA      OT Time Calculation (min) 28 min  -KA      Total Timed Code Minutes- OT 18 minute(s)   -KA      OT Received On 10/04/22  -RAPHAEL      OT - Next Appointment 10/05/22  -RAPHAEL      OT Goal Re-Cert Due Date 10/18/22  -RAPHAEL              Timed Charges    19020 - OT Self Care/Mgmt Minutes 18  -KA              Untimed Charges    OT Eval/Re-eval Minutes 10  -KA              Total Minutes    Timed Charges Total Minutes 18  -KA      Untimed Charges Total Minutes 10  -KA       Total Minutes 28  -KA            User Key  (r) = Recorded By, (t) = Taken By, (c) = Cosigned By    Initials Name Provider Type    Sakina Hi OT Occupational Therapist              Therapy Charges for Today     Code Description Service Date Service Provider Modifiers Qty    95837783870 HC OT SELF CARE/MGMT/TRAIN EA 15 MIN 10/4/2022 Sakina Lawson OT GO 1    [id number] HC OT EVAL MOD COMPLEXITY 2 10/4/2022 Sakina Lawson OT GO 1               Sakina Lawson OT  10/4/2022

## 2022-10-04 NOTE — PLAN OF CARE
Goal Outcome Evaluation:           Progress: no change  Outcome Evaluation: Patient has been resting comfortably. Up to chair this shift with therapy and tolerated well for a couple of hours. Return to bed with staff assist. Appetite adequate, fluid intake adequate. No complaints of pain. Voiding adequately. Plan for d/c tomorrow to [facility]. Vital signs stable. Call light in reach. Safety maintained.   Diuretic in Use: on hold  Response to Diuretics (Output greater than intake): output greater than intake   Daily Weight (up or down): stable.   O2 Requirements: Room air  Functional Status (Activity level, tolerance and respiratory symptoms): up with therapy today with rollator to chair.   Discharge Plans: Plans for d/c to [facility] on 10.05.2022, precert pending

## 2022-10-04 NOTE — PROGRESS NOTES
[facility] Progress Note      Patient Name: Joycelyn Esqueda  : 1930  MRN: [id number]  Primary Care Physician:  Giles Wheeler MD  Date of admission: 2022    Subjective     Interval History:   Follow up SIENNA on CKD III. Not soa at rest.  No appetite. Losing weight.  Bowels moving.  She does not want any procedures.     Review of Systems:   As noted above    Objective     Vitals:   Temp:  [98.1 °F (36.7 °C)-98.5 °F (36.9 °C)] 98.1 °F (36.7 °C)  Heart Rate:  [66-75] 66  Resp:  [18] 18  BP: (128-145)/(64-76) 128/72    Intake/Output Summary (Last 24 hours) at 10/4/2022 1401  Last data filed at 10/4/2022 1314  Gross per 24 hour   Intake 560 ml   Output 770 ml   Net -210 ml       Physical Exam:    General Appearance: alert, oriented x 3, no acute distress . Frail.   Skin: warm and dry  HEENT: oral mucosa normal, nonicteric sclera  Neck: supple, no JVD  Lungs: Clear to auscultation  Heart: RRR, 3/6 syst m. no s3 or rub  Abdomen: soft, nontender, nondistended. + bs  Extremities: 1+ lower ext edema   Neuro: normal speech and mental status     Scheduled Meds:     amLODIPine, 10 mg, Oral, Daily  aspirin, 81 mg, Oral, Daily  clidinium-chlordiazePOXIDE, 1 capsule, Oral, BID  famotidine, 20 mg, Oral, Daily  isosorbide mononitrate, 30 mg, Oral, Q24H  lactobacillus acidophilus, 1 capsule, Oral, Daily  levothyroxine, 50 mcg, Oral, Q AM  potassium chloride, 20 mEq, Oral, BID With Meals      IV Meds:        Results Reviewed:   I have personally reviewed the results from the time of this admission to 10/4/2022 14:01 EDT     Results from last 7 days   Lab Units 10/04/22  0428 10/03/22  0402 10/02/22  0424 10/01/22  0416 22  0418 22  1558   SODIUM mmol/L 138 140 142 140   < > 139   POTASSIUM mmol/L 4.4 4.4 4.0 3.3*   < > 4.5   CHLORIDE mmol/L 101 100 97* 98   < > 104   CO2 mmol/L 27.4 30.3* 31.0* 29.9*   < > 22.9   BUN mg/dL 44* 50* 50* 41*   < > 31*   CREATININE mg/dL 1.34*  1.62* 1.88* 1.53*   < > 1.23*   CALCIUM mg/dL 9.9* 9.9* 10.1* 10.2*   < > 10.0*   BILIRUBIN mg/dL  --   --   --  0.7  --  0.7   ALK PHOS U/L  --   --   --  87  --  92   ALT (SGPT) U/L  --   --   --  16  --  19   AST (SGOT) U/L  --   --   --  18  --  27   GLUCOSE mg/dL 96 116* 129* 117*   < > 110*    < > = values in this interval not displayed.       Estimated Creatinine Clearance: 17.7 mL/min (A) (by C-G formula based on SCr of 1.34 mg/dL (H)).    Results from last 7 days   Lab Units 09/29/22  1558   MAGNESIUM mg/dL 2.4*             Results from last 7 days   Lab Units 10/03/22  0402 10/02/22  0424 10/01/22  0417 09/30/22  0418 09/29/22  1558   WBC 10*3/mm3 8.03 8.15 8.15 6.79 5.79   HEMOGLOBIN g/dL 11.4* 12.2 12.2 11.2* 11.2*   PLATELETS 10*3/mm3 162 171 177 166 160       Results from last 7 days   Lab Units 09/29/22  1558   INR  1.24*       Assessment / Plan     ASSESSMENT:  1. Amy on CKD III. Close to baseline. 1.2-1.4. Off diuretic for now  2. Chronic diastolic heart failure .  I think that she will need some oral diuretic going forward.   3. HTN controlled .  4. Severe As  5. Monoclonal gammopathy .    PLAN:  1. No diuretic today.      Grazyna Rivera MD  10/04/22  14:01 EDT    [facility]  [phone]

## 2022-10-04 NOTE — DISCHARGE PLACEMENT REQUEST
"Joycelyn Saunders (89 y.o. Female)             Date of Birth   08/13/1930    Social Security Number       Address   [address] [place] [address]    Home Phone   [phone]    MRN   [id number]       Orthodoxy   None    Marital Status                               Admission Date   9/29/22    Admission Type   Emergency    Admitting Provider   Ji Wagner MD    Attending Provider   Ji Wagner MD    Department, Room/Bed   [address], [address]1       Discharge Date       Discharge Disposition       Discharge Destination                               Attending Provider: Ji Wagner MD    Allergies: Penicillins    Isolation: None   Infection: None   Code Status: CPR   Advance Care Planning Activity    Ht: 139.7 cm (55\")   Wt: 41.9 kg (92 lb 6.4 oz)    Admission Cmt: None   Principal Problem: None                Active Insurance as of 9/29/2022     Primary Coverage     Payor Plan Insurance Group Employer/Plan Group    AETNA MEDICARE REPLACEMENT AETNA MEDICARE REPLACEMENT [id number]     Payor Plan Address Payor Plan Phone Number Payor Plan Fax Number Effective Dates    [address] [phone]  1/1/2022 - None Entered    [facility] [id number]       Subscriber Name Subscriber Birth Date Member ID       Joycelyn Saunders 8/13/1930 [id number]           Secondary Coverage     Payor Plan Insurance Group Employer/Plan Group    [employer] BLUE CROSS [employer] BLUE CROSS BLUE SHIELD PPO [id number]     Payor Plan Address Payor Plan Phone Number Payor Plan Fax Number Effective Dates    [address] [phone]  1/1/2021 - None Entered    [facility] [id number]       Subscriber Name Subscriber Birth Date Member ID       JOYCELYN SAUNDERS 8/13/1930 [id number]                 Emergency Contacts      (Rel.) Home Phone Work Phone Mobile Phone    Cayla Batista (Friend) [phone] -- [phone]    Ruddy(financial POA)Rochelle (Friend) -- -- [phone]              "

## 2022-10-04 NOTE — PROGRESS NOTES
"Daily progress note    Primary care physician      Chief complaint  Doing same with no new complaints and making slow progress.  Patient agreeable to go to subacute rehab.    History of present illness  89-year-old white female with history of hypertension hypothyroidism osteoarthritis and gastroesophageal disease presented to [facility] emergency room with shortness of breath for last 1 week with chest heaviness and leg swelling.  Patient denies any fever cough abdominal pain nausea vomiting diarrhea.  Patient never been to this hospital before and followed by [facility] geriatric service.  Patient followed commands and answer all question appropriately and no her medications reviewed and refusing to take Coreg and losartan which is her home medications.     REVIEW OF SYSTEMS  Per history and physical     PHYSICAL EXAM  Blood pressure 144/76, pulse 68, temperature 98.2 °F (36.8 °C), temperature source Oral, resp. rate 18, height 139.7 cm (55\"), weight 41.9 kg (92 lb 6.4 oz), SpO2 96 %.    GENERAL: Awake and alert, very pleasant nontoxic-appearing  HEENT:  Unremarkable  NECK:  Supple  CV: regular rhythm, regular rate  RESPIRATORY: normal effort, significant increase in work of breathing with minimal exertion with some rales in bases  ABDOMEN: soft nontender bowel sounds positive  MUSCULOSKELETAL: no deformity, trace pedal edema bilaterally   NEURO: alert, moves all extremities, follows commands  SKIN: warm, dry     LAB RESULTS  Lab Results (last 24 hours)     Procedure Component Value Units Date/Time    Basic Metabolic Panel [[id number]]  (Abnormal) Collected: 10/04/22 0428    Specimen: Blood Updated: 10/04/22 0523     Glucose 96 mg/dL      BUN 44 mg/dL      Creatinine 1.34 mg/dL      Sodium 138 mmol/L      Potassium 4.4 mmol/L      Chloride 101 mmol/L      CO2 27.4 mmol/L      Calcium 9.9 mg/dL      BUN/Creatinine Ratio 32.8     Anion Gap 9.6 mmol/L      eGFR 37.3 mL/min/1.73     "  Comment: National Kidney Foundation and American Society of Nephrology (ASN) Task Force recommended calculation based on the Chronic Kidney Disease Epidemiology Collaboration (CKD-EPI) equation refit without adjustment for race.       Narrative:      GFR Normal >60  Chronic Kidney Disease <60  Kidney Failure <15          Imaging Results (Last 24 Hours)     ** No results found for the last 24 hours. **        ECG 12 Lead  Component   Ref Range & Units 1 d ago    QT Interval   ms 524    Resulting Agency [facility] ECG             HEART RATE= 57  bpm  RR Interval= 1053  ms  IL Interval=   ms  P Horizontal Axis=   deg  P Front Axis=   deg  QRSD Interval= 154  ms  QT Interval= 524  ms  QRS Axis= -57  deg  T Wave Axis= 102  deg  - ABNORMAL ECG -  Atrial flutter  LVH with IVCD, LAD and secondary repol abnrm  Probable inferior infarct, recent  Prolonged QT interval  No Prior Tracing for Comparison             Current Facility-Administered Medications:   •  amLODIPine (NORVASC) tablet 10 mg, 10 mg, Oral, Daily, Ji Wagner MD, 10 mg at 10/04/22 0925  •  aspirin chewable tablet 81 mg, 81 mg, Oral, Daily, Ji Wagner MD, 81 mg at 10/04/22 0925  •  clidinium-chlordiazePOXIDE (LIBRAX) 5-2.5 MG per capsule 1 capsule, 1 capsule, Oral, BID, Ji Wagner MD, 1 capsule at 10/04/22 0925  •  famotidine (PEPCID) tablet 20 mg, 20 mg, Oral, Daily, Ji Wagner MD, 20 mg at 10/04/22 0925  •  HYDROcodone-acetaminophen (NORCO) 5-325 MG per tablet 1 tablet, 1 tablet, Oral, Q8H PRN, Ji Wagner MD  •  isosorbide mononitrate (IMDUR) 24 hr tablet 30 mg, 30 mg, Oral, Q24H, Ji Wagner MD, 30 mg at 10/04/22 0925  •  lactobacillus acidophilus (RISAQUAD) capsule 1 capsule, 1 capsule, Oral, Daily, Ji Wagner MD, 1 capsule at 10/04/22 0925  •  levothyroxine (SYNTHROID, LEVOTHROID) tablet 50 mcg, 50 mcg, Oral, Q AM, Ji Wagner MD, 50 mcg at 10/04/22 0636  •  nitroglycerin (NITROSTAT) SL tablet 0.4 mg, 0.4 mg, Sublingual, Q5 Min PRN, Bernard,  MD Sourav  •  potassium chloride (K-DUR,KLOR-CON) ER tablet 20 mEq, 20 mEq, Oral, BID With Meals, Osmin Bennett MD, 20 mEq at 10/04/22 0925  •  [COMPLETED] Insert peripheral IV, , , Once **AND** sodium chloride 0.9 % flush 10 mL, 10 mL, Intravenous, PRN, Osmin Johansen MD     ASSESSMENT  Acute on chronic diastolic congestive heart failure  Acute kidney injury  Severe aortic stenosis  Hypokalemia resolved  Hypertension  Hypothyroidism  Osteoarthritis  Chronic kidney disease stage III  Gastroesophageal reflux disease    PLAN  CPM  Discontinue IVF  Resume diuretics per nephrology  Strict I's and O's and daily weight  Nephrology consult appreciated  Cardiology to follow patient  Adjust home medications  Stress ulcer DVT prophylaxis  Supportive care  PT/OT  Discussed with nursing staff and family  Subacute rehab once bed available    SOURAV DUTTON MD

## 2022-10-04 NOTE — PROGRESS NOTES
Continued Stay Note  [facility]     Patient Name: Joycelyn Esqueda  MRN: [id number]  Today's Date: 10/4/2022    Admit Date: 9/29/2022    Plan: [facility] pending pre-cert   Discharge Plan     Row Name 10/04/22 1540       Plan    Plan [facility] pending pre-cert    Plan Comments Follow up with patient and friend/caregiver (Cayla) at the bedside. They're agreeable with referrals to [facility] and [facility]. [facility] referrals sent accordingly. Spoke with Caity, [facility] in network and will accept. Caity will initiate [employer] MCR pre-cert. Caity will update CCP when cert is approved. Anticipate wc van or caregiver will transport. [facility] is selected in [facility].               Discharge Codes    No documentation.               Expected Discharge Date and Time     Expected Discharge Date Expected Discharge Time    Oct 5, 2022             Santa [place] RN

## 2022-10-04 NOTE — PLAN OF CARE
Goal Outcome Evaluation:  Plan of Care Reviewed With: patient        Progress: improving  Outcome Evaluation: Pt has not had any diuretics..  Diuretic in Use: on hold  Response to Diuretics (Output greater than intake): even  Daily Weight (up or down): up   O2 Requirements: Room air  Functional Status (Activity level, tolerance and respiratory symptoms): soa on exceration  Discharge Plans:  Rehab in a day or 2

## 2022-10-04 NOTE — PROGRESS NOTES
"[facility]  Cardiology Progress Note    Patient Identification:  Name: Joycelyn Esqueda  Age: 89 y.o.  Sex: female  :  1930  MRN: [id number]                 Follow Up / Chief Complaint: Aortic stenosis/CHF/chest heaviness    Interval History: No acute events overnight.  Sinus rhythm on the monitor.  No changes from our perspective.        Objective:    Past Medical History:  Past Medical History:   Diagnosis Date    Hypertension     Thyroid disease      Past Surgical History:  No past surgical history on file.     Social History:   Social History     Tobacco Use    Smoking status: Never Smoker    Smokeless tobacco: Not on file   Substance Use Topics    Alcohol use: No      Family History:  No family history on file.       Allergies:  Allergies   Allergen Reactions    Penicillins      Scheduled Meds:  amLODIPine, 10 mg, Daily  aspirin, 81 mg, Daily  clidinium-chlordiazePOXIDE, 1 capsule, BID  famotidine, 20 mg, Daily  isosorbide mononitrate, 30 mg, Q24H  lactobacillus acidophilus, 1 capsule, Daily  levothyroxine, 50 mcg, Q AM  potassium chloride, 20 mEq, BID With Meals            INTAKE AND OUTPUT:    Intake/Output Summary (Last 24 hours) at 10/4/2022 1434  Last data filed at 10/4/2022 1314  Gross per 24 hour   Intake 560 ml   Output 770 ml   Net -210 ml       ROS  Constitutional: Awake and alert, no fever. No nosebleeds  Abdomen           no abdominal pain   Cardiac              no chest pain  Pulmonary          no shortness of breath      /56 (BP Location: Right arm, Patient Position: Lying)   Pulse 68   Temp 98 °F (36.7 °C) (Oral)   Resp 18   Ht 139.7 cm (55\")   Wt 42.3 kg (93 lb 3.2 oz)   SpO2 96%   BMI 21.66 kg/m²   General appearance: No acute changes   Neck: Trachea midline; NECK, supple, no thyromegaly or lymphadenopathy   Lungs: Normal size and shape, normal breath sounds, equal distribution of air, no rales or rhonchi   CV: S1-S2 regular, sys murmurs, no rub, no gallop "   Abdomen: Soft, nontender; no masses , no abnormal abdominal sounds   Extremities: No deformity , normal color , no peripheral edema   Skin: Normal temperature, turgor and texture; no rash, ulcers              I reviewed the patient's new clinical results, and personally reviewed and interpreted the patient's ECG and telemetry data from the last 24 hours      Cardiographics  Telemetry:   sr      ECG:     Echocardiogram:       Interpretation Summary    Estimated right ventricular systolic pressure from tricuspid regurgitation is normal (<35 mmHg).  Peak velocity of the flow distal to the aortic valve is 427.7 cm/s. Aortic valve maximum pressure gradient is 73.2 mmHg. Aortic valve mean pressure gradient is 41.5 mmHg. Aortic valve dimensionless index is 0.2 .  Severe aortic valve stenosis is present. Aortic valve area is 0.63 cm2.  Calculated left ventricular EF = 65.8% Estimated left ventricular EF was in agreement with the calculated left ventricular EF.  Left ventricular diastolic function is consistent with (grade Ia w/high LAP) impaired relaxation.  The right atrial cavity is borderline dilated.  There is a small (<1cm) pericardial effusion.          Lab Review   Results from last 7 days   Lab Units 10/01/22  0416 09/30/22  0418 09/29/22  1904   TROPONIN T ng/mL 0.025 0.021 0.012     Results from last 7 days   Lab Units 09/29/22  1558   MAGNESIUM mg/dL 2.4*     Results from last 7 days   Lab Units 10/04/22  0428   SODIUM mmol/L 138   POTASSIUM mmol/L 4.4   BUN mg/dL 44*   CREATININE mg/dL 1.34*   CALCIUM mg/dL 9.9*        Results from last 7 days   Lab Units 10/03/22  0402 10/02/22  0424 10/01/22  0417   WBC 10*3/mm3 8.03 8.15 8.15   HEMOGLOBIN g/dL 11.4* 12.2 12.2   HEMATOCRIT % 35.7 38.0 37.9   PLATELETS 10*3/mm3 162 171 177     Results from last 7 days   Lab Units 09/29/22  1558   INR  1.24*   APTT seconds 33.4       Intake/Output Summary (Last 24 hours) at 10/4/2022 1434  Last data filed at 10/4/2022  "1314  Gross per 24 hour   Intake 560 ml   Output 770 ml   Net -210 ml     Assessment:  Acute heart failure, volume overloaded: EF 65% with severe aortic stenosis.  Aortic stenosis: she declines any testing.  Hypertension: stable  SIENNA on CKD: Nephrology following   Chest pain: Resolved.    Plan:  Blood pressure and heart rate are stable.  No changes from our perspective.  Nephrology diuresing and managing electrolytes.  Monitored reviewed without any events overnight.  Long discussion with daughter and patient and no further testing or procedures.  We will medical manage.       Had a long discussion with the patient as well as the daughter conservative management only    Jazmyne Mei MD           )10/4/2022       EMR Dragon/Transcription:   \"Dictated utilizing Dragon dictation\".     "

## 2022-10-04 NOTE — PLAN OF CARE
Goal Outcome Evaluation:  Plan of Care Reviewed With: patient        Progress: improving  Outcome Evaluation: Pt had no c/o pain or discomfort. Pt up to bedside commode. Pt wanted to go to [facility] however they are out of network. Hopefully will find a place for tomorrow d/c. Pt is weak. Rested well. CTM, safety maintained.

## 2022-10-04 NOTE — PLAN OF CARE
Goal Outcome Evaluation:  Plan of Care Reviewed With: patient           Outcome Evaluation: Pt seen for OT guillermina this AM. Pt admitted with acute on chronic congestive heart failure. PMHx includes HTN, OA, CKD, and CAD. Pt reports living alone in a single level home with a caregiver for 6hrs/day that assists her with ADLs and home management. She reports being indep with her rollator for functional mobility. Pt states she is able to ambulate to bathroom and complete toileting when caregiver is not there. Pt reports no DANIELA and has a shower chair and grab bars in bathroom. Today pt transitioned from supine to sit with SBA and transferred to toilet with min A and cues for walker placement. Pt able to complete marleen care standing with CGA for balance. Required max A for management of brief and mod A for socks. Pt required seated rest break. Able to perform functional mobility around bed to chair with increased time and min A for managing rollator at times. Pt presents with decreased strength, balance, endurance activity tolerance, functional mobility and ADL performance. Pt to benefit from skilled OT to address deficits and maximize independence with ADLs. Rec SNF at [place].

## 2022-10-05 LAB
ANION GAP SERPL CALCULATED.3IONS-SCNC: 8 MMOL/L (ref 5–15)
BASOPHILS # BLD AUTO: 0.03 10*3/MM3 (ref 0–0.2)
BASOPHILS NFR BLD AUTO: 0.4 % (ref 0–1.5)
BUN SERPL-MCNC: 39 MG/DL (ref 8–23)
BUN/CREAT SERPL: 35.5 (ref 7–25)
CALCIUM SPEC-SCNC: 9.7 MG/DL (ref 8.2–9.6)
CHLORIDE SERPL-SCNC: 104 MMOL/L (ref 98–107)
CO2 SERPL-SCNC: 26 MMOL/L (ref 22–29)
CREAT SERPL-MCNC: 1.1 MG/DL (ref 0.57–1)
DEPRECATED RDW RBC AUTO: 44.1 FL (ref 37–54)
EGFRCR SERPLBLD CKD-EPI 2021: 47.2 ML/MIN/1.73
EOSINOPHIL # BLD AUTO: 0.41 10*3/MM3 (ref 0–0.4)
EOSINOPHIL NFR BLD AUTO: 5.9 % (ref 0.3–6.2)
ERYTHROCYTE [DISTWIDTH] IN BLOOD BY AUTOMATED COUNT: 13.8 % (ref 12.3–15.4)
GLUCOSE SERPL-MCNC: 95 MG/DL (ref 65–99)
HCT VFR BLD AUTO: 36.4 % (ref 34–46.6)
HGB BLD-MCNC: 11.9 G/DL (ref 12–15.9)
IMM GRANULOCYTES # BLD AUTO: 0.02 10*3/MM3 (ref 0–0.05)
IMM GRANULOCYTES NFR BLD AUTO: 0.3 % (ref 0–0.5)
LYMPHOCYTES # BLD AUTO: 1.2 10*3/MM3 (ref 0.7–3.1)
LYMPHOCYTES NFR BLD AUTO: 17.1 % (ref 19.6–45.3)
MCH RBC QN AUTO: 28.7 PG (ref 26.6–33)
MCHC RBC AUTO-ENTMCNC: 32.7 G/DL (ref 31.5–35.7)
MCV RBC AUTO: 87.9 FL (ref 79–97)
MONOCYTES # BLD AUTO: 0.69 10*3/MM3 (ref 0.1–0.9)
MONOCYTES NFR BLD AUTO: 9.9 % (ref 5–12)
NEUTROPHILS NFR BLD AUTO: 4.65 10*3/MM3 (ref 1.7–7)
NEUTROPHILS NFR BLD AUTO: 66.4 % (ref 42.7–76)
NRBC BLD AUTO-RTO: 0 /100 WBC (ref 0–0.2)
PLATELET # BLD AUTO: 149 10*3/MM3 (ref 140–450)
PMV BLD AUTO: 10.3 FL (ref 6–12)
POTASSIUM SERPL-SCNC: 4.5 MMOL/L (ref 3.5–5.2)
RBC # BLD AUTO: 4.14 10*6/MM3 (ref 3.77–5.28)
SODIUM SERPL-SCNC: 138 MMOL/L (ref 136–145)
WBC NRBC COR # BLD: 7 10*3/MM3 (ref 3.4–10.8)

## 2022-10-05 PROCEDURE — 85025 COMPLETE CBC W/AUTO DIFF WBC: CPT | Performed by: HOSPITALIST

## 2022-10-05 PROCEDURE — 97110 THERAPEUTIC EXERCISES: CPT

## 2022-10-05 PROCEDURE — 99232 SBSQ HOSP IP/OBS MODERATE 35: CPT

## 2022-10-05 PROCEDURE — 80048 BASIC METABOLIC PNL TOTAL CA: CPT | Performed by: NURSE PRACTITIONER

## 2022-10-05 PROCEDURE — 25010000002 ONDANSETRON PER 1 MG: Performed by: HOSPITALIST

## 2022-10-05 RX ORDER — TORSEMIDE 10 MG/1
10 TABLET ORAL DAILY
Status: DISCONTINUED | OUTPATIENT
Start: 2022-10-05 | End: 2022-10-06 | Stop reason: HOSPADM

## 2022-10-05 RX ORDER — AMLODIPINE BESYLATE 5 MG/1
5 TABLET ORAL DAILY
Status: DISCONTINUED | OUTPATIENT
Start: 2022-10-06 | End: 2022-10-06 | Stop reason: HOSPADM

## 2022-10-05 RX ORDER — ONDANSETRON 2 MG/ML
4 INJECTION INTRAMUSCULAR; INTRAVENOUS EVERY 4 HOURS PRN
Status: DISCONTINUED | OUTPATIENT
Start: 2022-10-05 | End: 2022-10-06

## 2022-10-05 RX ORDER — FAMOTIDINE 20 MG/1
20 TABLET, FILM COATED ORAL
Status: DISCONTINUED | OUTPATIENT
Start: 2022-10-05 | End: 2022-10-06 | Stop reason: HOSPADM

## 2022-10-05 RX ADMIN — FAMOTIDINE 20 MG: 20 TABLET ORAL at 17:12

## 2022-10-05 RX ADMIN — CHLORDIAZEPOXIDE HYDROCHLORIDE AND CLIDINIUM BROMIDE 1 CAPSULE: 5; 2.5 CAPSULE ORAL at 09:31

## 2022-10-05 RX ADMIN — ONDANSETRON 4 MG: 2 INJECTION INTRAMUSCULAR; INTRAVENOUS at 14:23

## 2022-10-05 RX ADMIN — POTASSIUM CHLORIDE 20 MEQ: 750 TABLET, EXTENDED RELEASE ORAL at 09:31

## 2022-10-05 RX ADMIN — ISOSORBIDE MONONITRATE 30 MG: 30 TABLET, EXTENDED RELEASE ORAL at 09:31

## 2022-10-05 RX ADMIN — POTASSIUM CHLORIDE 20 MEQ: 750 TABLET, EXTENDED RELEASE ORAL at 17:12

## 2022-10-05 RX ADMIN — FAMOTIDINE 20 MG: 20 TABLET ORAL at 09:31

## 2022-10-05 RX ADMIN — AMLODIPINE BESYLATE 10 MG: 10 TABLET ORAL at 09:31

## 2022-10-05 RX ADMIN — TORSEMIDE 10 MG: 10 TABLET ORAL at 09:31

## 2022-10-05 RX ADMIN — LEVOTHYROXINE SODIUM 50 MCG: 0.05 TABLET ORAL at 05:52

## 2022-10-05 RX ADMIN — CHLORDIAZEPOXIDE HYDROCHLORIDE AND CLIDINIUM BROMIDE 1 CAPSULE: 5; 2.5 CAPSULE ORAL at 20:21

## 2022-10-05 RX ADMIN — Medication 1 CAPSULE: at 09:31

## 2022-10-05 RX ADMIN — ASPIRIN 81 MG: 81 TABLET, CHEWABLE ORAL at 09:31

## 2022-10-05 NOTE — PROGRESS NOTES
[facility]  Cardiology Progress Note    Patient Identification:  Name: Joycelyn Esqueda  Age: 89 y.o.  Sex: female  :  1930  MRN: [id number]                 Follow Up / Chief Complaint: Aortic stenosis/CHF/chest heaviness    Interval History: resting in bed, on room air        Objective:    Past Medical History:  Past Medical History:   Diagnosis Date   • Hypertension    • Thyroid disease      Past Surgical History:  No past surgical history on file.     Social History:   Social History     Tobacco Use   • Smoking status: Never Smoker   • Smokeless tobacco: Not on file   Substance Use Topics   • Alcohol use: No      Family History:  No family history on file.       Allergies:  Allergies   Allergen Reactions   • Penicillins      Scheduled Meds:  amLODIPine, 10 mg, Daily  aspirin, 81 mg, Daily  clidinium-chlordiazePOXIDE, 1 capsule, BID  famotidine, 20 mg, Daily  isosorbide mononitrate, 30 mg, Q24H  lactobacillus acidophilus, 1 capsule, Daily  levothyroxine, 50 mcg, Q AM  potassium chloride, 20 mEq, BID With Meals  torsemide, 10 mg, Daily            INTAKE AND OUTPUT:    Intake/Output Summary (Last 24 hours) at 10/5/2022 1316  Last data filed at 10/5/2022 0959  Gross per 24 hour   Intake 700 ml   Output 375 ml   Net 325 ml       Review of Systems:    GI: + n/ no vomiting or abd pain  Cardiac: no chest pain or palps  Pulmonary: no shortness of breath or cough      Constitutional:  Temp:  [97.4 °F (36.3 °C)-98 °F (36.7 °C)] 98 °F (36.7 °C)  Heart Rate:  [63-69] 68  Resp:  [18] 18  BP: ()/(51-72) 108/56          Physical Exam:  General:  Appears in no acute distress  Eyes: eom normal no conjunctival drainage  HEENT:  No JVD. Thyroid not visibly enlarged. No mucosal pallor or cyanosis  Respiratory: Respirations regular and unlabored at rest. BBS with good air entry in all fields. No crackles, rubs or wheezes auscultated  Cardiovascular: S1S2 Regular rate and rhythm. No murmur, rub or gallop.  No carotid bruits. DP/PT pulses  2+   . No pretibial pitting edema  Gastrointestinal: Abdomen soft, non tender. Bowel sounds present.   Musculoskeletal: DURAN x4. No abnormal movements  Neuro: AAO x3 CN II-XII grossly intact  Psych: Mood and affect normal, pleasant and cooperative            I reviewed the patient's new clinical results, and personally reviewed and interpreted the patient's ECG and telemetry data from the last 24 hours      Cardiographics  Telemetry:   sr      ECG:     Echocardiogram:       Interpretation Summary    · Estimated right ventricular systolic pressure from tricuspid regurgitation is normal (<35 mmHg).  · Peak velocity of the flow distal to the aortic valve is 427.7 cm/s. Aortic valve maximum pressure gradient is 73.2 mmHg. Aortic valve mean pressure gradient is 41.5 mmHg. Aortic valve dimensionless index is 0.2 .  · Severe aortic valve stenosis is present. Aortic valve area is 0.63 cm2.  · Calculated left ventricular EF = 65.8% Estimated left ventricular EF was in agreement with the calculated left ventricular EF.  · Left ventricular diastolic function is consistent with (grade Ia w/high LAP) impaired relaxation.  · The right atrial cavity is borderline dilated.  · There is a small (<1cm) pericardial effusion.          Lab Review   Results from last 7 days   Lab Units 10/01/22  0416 09/30/22  0418 09/29/22  1904   TROPONIN T ng/mL 0.025 0.021 0.012     Results from last 7 days   Lab Units 09/29/22  1558   MAGNESIUM mg/dL 2.4*     Results from last 7 days   Lab Units 10/05/22  0508   SODIUM mmol/L 138   POTASSIUM mmol/L 4.5   BUN mg/dL 39*   CREATININE mg/dL 1.10*   CALCIUM mg/dL 9.7*        Results from last 7 days   Lab Units 10/05/22  0508 10/03/22  0402 10/02/22  0424   WBC 10*3/mm3 7.00 8.03 8.15   HEMOGLOBIN g/dL 11.9* 11.4* 12.2   HEMATOCRIT % 36.4 35.7 38.0   PLATELETS 10*3/mm3 149 162 171     Results from last 7 days   Lab Units 09/29/22  1558   INR  1.24*   APTT seconds 33.4  "      Intake/Output Summary (Last 24 hours) at 10/5/2022 1316  Last data filed at 10/5/2022 0959  Gross per 24 hour   Intake 700 ml   Output 375 ml   Net 325 ml     Assessment:  Acute heart failure, volume overloaded: EF 65% with severe aortic stenosis.  Aortic stenosis: she declines any testing.  Hypertension: stable  SIENNA on CKD: Nephrology following   Chest pain: Resolved.    Plan:  Discussed with rn-standing weight today 78lbs, she had been weighed in the bed until today.   BP soft today, decrease amlodipine to 5mg.   Tele reviewed, sr, no events overnight.  Nephrology diuresing, switched to po torsemide  Some dyspepsia, discussed with rn, zofran already ordered and pepcid increased  Cardiac stable-no further testing. Continue medical management            )10/5/2022       EMR Dragon/Transcription:   \"Dictated utilizing Dragon dictation\".     "

## 2022-10-05 NOTE — THERAPY TREATMENT NOTE
Patient Name: Joycelyn Esqueda  : 1930    MRN: [id number]                              Today's Date: 10/5/2022       Admit Date: 2022    Visit Dx:     ICD-10-CM ICD-9-CM   1. Combined systolic and diastolic congestive heart failure, unspecified HF chronicity (HCC)  I50.40 428.40     Patient Active Problem List   Diagnosis   • CHF (congestive heart failure) (HCC)   • Combined systolic and diastolic congestive heart failure, unspecified HF chronicity (HCC)     Past Medical History:   Diagnosis Date   • Hypertension    • Thyroid disease      No past surgical history on file.   General Information     Row Name 10/05/22 1041          Physical Therapy Time and Intention    Document Type therapy note (daily note)  -     Mode of Treatment individual therapy;physical therapy  -     Row Name 10/05/22 104          General Information    Patient Profile Reviewed yes  -     Existing Precautions/Restrictions fall  -     Row Name 10/05/22 104          Cognition    Orientation Status (Cognition) person;place;unable/difficult to assess  just awakened pt, little conversation  -     Row Name 10/05/22 104          Safety Issues, Functional Mobility    Safety Issues Affecting Function (Mobility) insight into deficits/self-awareness;judgment;positioning of assistive device;problem-solving;safety precaution awareness  -     Impairments Affecting Function (Mobility) balance;coordination;endurance/activity tolerance;postural/trunk control;strength  -           User Key  (r) = Recorded By, (t) = Taken By, (c) = Cosigned By    Initials Name Provider Type    Lupe Londono PTA Physical Therapist Assistant               Mobility     Row Name 10/05/22 104          Bed Mobility    Supine-Sit [place] (Bed Mobility) minimum assist (75% patient effort);moderate assist (50% patient effort);verbal cues;nonverbal cues (demo/gesture)  -     Sit-Supine [place] (Bed Mobility) moderate assist (50% patient  effort);verbal cues;nonverbal cues (demo/gesture)  -     Assistive Device (Bed Mobility) bed rails;draw sheet;head of bed elevated  -     Row Name 10/05/22 1042          Bed-Chair Transfer    Bed-Chair [place] (Transfers) minimum assist (75% patient effort);moderate assist (50% patient effort);verbal cues;nonverbal cues (demo/gesture)  -     Comment, (Bed-Chair Transfer) bed > bsc>bed  -     Row Name 10/05/22 1042          Sit-Stand Transfer    Sit-Stand [place] (Transfers) minimum assist (75% patient effort);verbal cues;nonverbal cues (demo/gesture)  needs extra time  -     Assistive Device (Sit-Stand Transfers) walker, front-wheeled  -     Comment, (Sit-Stand Transfer) forw head, but post lean-did just awaken pt  -     Row Name 10/05/22 1042          Gait/Stairs (Locomotion)    [place] Level (Gait) minimum assist (75% patient effort);verbal cues;nonverbal cues (demo/gesture)  -     Assistive Device (Gait) walker, front-wheeled  -     Distance in Feet (Gait) 4 steps bsc to bed, difficulty moving feet toward bed today  -     Deviations/Abnormal Patterns (Gait) festinating/shuffling;weight shifting decreased  -     Bilateral Gait Deviations forward flexed posture  -           User Key  (r) = Recorded By, (t) = Taken By, (c) = Cosigned By    Initials Name Provider Type    Lupe Londono PTA Physical Therapist Assistant               Obj/Interventions    No documentation.                Goals/Plan    No documentation.                Clinical Impression     Row Name 10/05/22 1045          Pain    Pretreatment Pain Rating 0/10 - no pain  -     Posttreatment Pain Rating 0/10 - no pain  -     Row Name 10/05/22 1045          Plan of Care Review    Plan of Care Reviewed With patient  -     Progress declining  -     Outcome Evaluation Pt was asleep upon entry, breakfast not touched; pt agreed to attempt short amb , but need for bsc limiting, assisted w/bsc and clean-up, new  brief, pt then too fatigued and only able to take 4 shuffle steps bed>bsc>bed; pt w/initial post lean , but forw head; declined further amb due to incr fatigue, denied pain, will need SNU at [place], prior to adm pt lived alone per charting, unsafe currently to return to that setting  -     Row Name 10/05/22 1045          Therapy Assessment/Plan (PT)    Rehab Potential (PT) fair, will monitor progress closely  -     Criteria for Skilled Interventions Met (PT) yes  -     Row Name 10/05/22 1045          Vital Signs    O2 Delivery Pre Treatment room air  -     Row Name 10/05/22 1045          Positioning and Restraints    Pre-Treatment Position in bed  -     Post Treatment Position bed  -     In Bed side lying left;call light within reach;encouraged to call for assist;exit alarm on;with nsg  -           User Key  (r) = Recorded By, (t) = Taken By, (c) = Cosigned By    Initials Name Provider Type    Lupe Londono PTA Physical Therapist Assistant               Outcome Measures     Row Name 10/05/22 1049          How much help from another person do you currently need...    Turning from your back to your side while in flat bed without using bedrails? 3  -JM     Moving from lying on back to sitting on the side of a flat bed without bedrails? 2  -JM     Moving to and from a bed to a chair (including a wheelchair)? 3  -JM     Standing up from a chair using your arms (e.g., wheelchair, bedside chair)? 3  -JM     Climbing 3-5 steps with a railing? 1  -JM     To walk in hospital room? 2  -JM     AM-PAC 6 Clicks Score (PT) 14  -     Highest level of mobility 4 --> Transferred to chair/commode  -           User Key  (r) = Recorded By, (t) = Taken By, (c) = Cosigned By    Initials Name Provider Type    Lupe Londono PTA Physical Therapist Assistant                             Physical Therapy Education                 Title: PT OT SLP Therapies (Done)     Topic: Physical Therapy (Done)     Point: Mobility  training (Done)     Learning Progress Summary           Patient Acceptance, E,D, VU,NR by  at 10/5/2022 1050    Acceptance, E,D, NR,VU by TG at 10/5/2022 0533    Acceptance, E, VU by AG at 10/1/2022 1231                   Point: Body mechanics (Done)     Learning Progress Summary           Patient Acceptance, E,D, VU,NR by  at 10/5/2022 1050    Acceptance, E,D, NR,VU by TG at 10/5/2022 0533    Acceptance, E, VU by AG at 10/1/2022 1231                   Point: Precautions (Done)     Learning Progress Summary           Patient Acceptance, E,D, VU,NR by  at 10/5/2022 1050    Acceptance, E,D, NR,VU by TG at 10/5/2022 0533    Acceptance, E, VU by MD at 10/3/2022 1645    Acceptance, E, VU by MD at 10/3/2022 1554    Acceptance, E, VU by AG at 10/1/2022 1231                               User Key     Initials Effective Dates Name Provider Type Discipline    MD 06/16/21 -  Tiara Regalado, PT Physical Therapist PT     03/07/18 -  Lupe Mejia, PTA Physical Therapist Assistant PT     06/16/21 -  Yvonne Verduzco, RN Registered Nurse Nurse     01/27/22 -  Cheri James, PT Physical Therapist PT              PT Recommendation and Plan     Plan of Care Reviewed With: patient  Progress: declining  Outcome Evaluation: Pt was asleep upon entry, breakfast not touched; pt agreed to attempt short amb , but need for bsc limiting, assisted w/bsc and clean-up, new brief, pt then too fatigued and only able to take 4 shuffle steps bed>bsc>bed; pt w/initial post lean , but forw head; declined further amb due to incr fatigue, denied pain, will need SNU at [place], prior to adm pt lived alone per charting, unsafe currently to return to that setting     Time Calculation:    PT Charges     Row Name 10/05/22 1051             Time Calculation    Start Time 0900  -      Stop Time 0923  -      Time Calculation (min) 23 min  -      PT Received On 10/05/22  -      PT - Next Appointment 10/06/22  -            User Key  (r) = Recorded  By, (t) = Taken By, (c) = Cosigned By    Initials Name Provider Type    JM Lupe Mejia PTA Physical Therapist Assistant              Therapy Charges for Today     Code Description Service Date Service Provider Modifiers Qty    [id number] HC PT THER PROC EA 15 MIN 10/5/2022 Lupe Mejia PTA GP 2          PT G-Codes  Outcome Measure Options: AM-PAC 6 Clicks Daily Activity (OT)  AM-PAC 6 Clicks Score (PT): 14  AM-PAC 6 Clicks Score (OT): 15    Lupe Mejia PTA  10/5/2022

## 2022-10-05 NOTE — PLAN OF CARE
Goal Outcome Evaluation:  Plan of Care Reviewed With: patient        Progress: declining  Outcome Evaluation: Pt was asleep upon entry, breakfast not touched; pt agreed to attempt short amb , but need for bsc limiting, assisted w/bsc and clean-up, new brief, pt then too fatigued and only able to take 4 shuffle steps bed>bsc>bed; pt w/initial post lean , but forw head; declined further amb due to incr fatigue, denied pain, [facility] need SN[facility], prior to adm pt lived alone per charting, unsafe currently to return to that setting    Patient was not wearing a face mask during this therapy encounter. Therapist used appropriate personal protective equipment including eye protection, mask, and gloves.  Mask used was standard procedure mask. Appropriate PPE was worn during the entire therapy session. Hand hygiene was completed before and after therapy session. Patient is not in enhanced droplet precautions.

## 2022-10-05 NOTE — PROGRESS NOTES
[facility] Progress Note      Patient Name: Joycelyn Esqueda  : 1930  MRN: [id number]  Primary Care Physician:  Giles Wheeler MD  Date of admission: 2022    Subjective     Interval History:   Follow up SIENNA on CKD III. Slept fair. Back uncomfortable in bed.   Bowels moved.      Review of Systems:   As noted above    Objective     Vitals:   Temp:  [97.8 °F (36.6 °C)-98.1 °F (36.7 °C)] 97.8 °F (36.6 °C)  Heart Rate:  [63-69] 63  Resp:  [18] 18  BP: (128-133)/(68-72) 133/68    Intake/Output Summary (Last 24 hours) at 10/5/2022 0823  Last data filed at 10/4/2022 1736  Gross per 24 hour   Intake 800 ml   Output 475 ml   Net 325 ml       Physical Exam:    General Appearance: alert, oriented x 3, no acute distress . Frail.   Skin: warm and dry  HEENT: oral mucosa normal, nonicteric sclera  Neck: supple, no JVD  Lungs: Clear to auscultation anteriorly .  Heart: RRR, 3/6 syst m. no s3 or rub  Abdomen: soft, nontender, nondistended. + bs  Extremities: 1+ lower ext edema   Neuro: normal speech and mental status     Scheduled Meds:     amLODIPine, 10 mg, Oral, Daily  aspirin, 81 mg, Oral, Daily  clidinium-chlordiazePOXIDE, 1 capsule, Oral, BID  famotidine, 20 mg, Oral, Daily  isosorbide mononitrate, 30 mg, Oral, Q24H  lactobacillus acidophilus, 1 capsule, Oral, Daily  levothyroxine, 50 mcg, Oral, Q AM  potassium chloride, 20 mEq, Oral, BID With Meals  torsemide, 10 mg, Oral, Daily      IV Meds:        Results Reviewed:   I have personally reviewed the results from the time of this admission to 10/5/2022 08:23 EDT     Results from last 7 days   Lab Units 10/05/22  0508 10/04/22  0428 10/03/22  0402 10/02/22  0424 10/01/22  0416 22  0418 22  1558   SODIUM mmol/L 138 138 140   < > 140   < > 139   POTASSIUM mmol/L 4.5 4.4 4.4   < > 3.3*   < > 4.5   CHLORIDE mmol/L 104 101 100   < > 98   < > 104   CO2 mmol/L 26.0 27.4 30.3*   < > 29.9*   < > 22.9   BUN mg/dL 39* 44* 50*    < > 41*   < > 31*   CREATININE mg/dL 1.10* 1.34* 1.62*   < > 1.53*   < > 1.23*   CALCIUM mg/dL 9.7* 9.9* 9.9*   < > 10.2*   < > 10.0*   BILIRUBIN mg/dL  --   --   --   --  0.7  --  0.7   ALK PHOS U/L  --   --   --   --  87  --  92   ALT (SGPT) U/L  --   --   --   --  16  --  19   AST (SGOT) U/L  --   --   --   --  18  --  27   GLUCOSE mg/dL 95 96 116*   < > 117*   < > 110*    < > = values in this interval not displayed.       Estimated Creatinine Clearance: 21.8 mL/min (A) (by C-G formula based on SCr of 1.1 mg/dL (H)).    Results from last 7 days   Lab Units 09/29/22  1558   MAGNESIUM mg/dL 2.4*             Results from last 7 days   Lab Units 10/05/22  0508 10/03/22  0402 10/02/22  0424 10/01/22  0417 09/30/22  0418   WBC 10*3/mm3 7.00 8.03 8.15 8.15 6.79   HEMOGLOBIN g/dL 11.9* 11.4* 12.2 12.2 11.2*   PLATELETS 10*3/mm3 149 162 171 177 166       Results from last 7 days   Lab Units 09/29/22  1558   INR  1.24*       Assessment / Plan     ASSESSMENT:  1. Amy on CKD III. Creatinine better. 1.2-1.4. Weight up.  Cautious with diuretic given her severe AS, but I am concerned that she will decompensate from heart failure if not on any diuretic.  Low dose torsemide.   2. Chronic diastolic heart failure .  Start low dose torsemide today .  3. HTN controlled .  4. Severe AS.  5. Monoclonal gammopathy .    PLAN:  1. Torsemide 10 mg daily.  2. Get standing weight today .  3. If dc, will need BMP on Friday.     Grazyna Rivera MD  10/05/22  08:23 EDT    [facility]  [phone]

## 2022-10-05 NOTE — PLAN OF CARE
Goal Outcome Evaluation:  Plan of Care Reviewed With: patient        Progress: no change  Outcome Evaluation: Pt rested well up to BSC, BM. Pt has not had any diurectics this shift. Plan is to d/c to [facility] today.  Diuretic in Use: on hold  Response to Diuretics (Output greater than intake): greater  Daily Weight (up or down): stable  O2 Requirements: RA  Functional Status (Activity level, tolerance and respiratory symptoms): soa with any activity  Discharge Plans:  D/c today to rehab

## 2022-10-05 NOTE — PLAN OF CARE
Goal Outcome Evaluation:  Plan of Care Reviewed With: patient, caregiver        Progress: no change  Outcome Evaluation: Patient has been alert and oriented. Complaint of chest discomfort/reflux x2 this shift. Small amount of emesis, more of  spitting up, after lunch, approx 10-15ml, prn zofran ordered and given with positive effects. Change in Pepcid dosing as well. Patient up to [facility] and worked with therapy today. Standing weight obtained, 89.1 lbs. Appetite fair, fluid intake adequate.  Diuretic in Use: Torsemide 10 mg today  Response to Diuretics (Output greater than intake): output is greater than intake  Daily Weight (up or down): standing weight obtained, and verified x2. Weight is 89.1lbs. will continue standing scale only for accuracy  O2 Requirements: room air  Functional Status (Activity level, tolerance and respiratory symptoms): continues to work with therapy and ambulates short distances with rollator and SBA, but fatigues quickly    Discharge Plans: Awaiting [employer] precert. Has accepting facility.

## 2022-10-05 NOTE — PROGRESS NOTES
"Daily progress note    Primary care physician      Chief complaint  Doing same with no new complaints except nausea    History of present illness  89-year-old white female with history of hypertension hypothyroidism osteoarthritis and gastroesophageal disease presented to [facility] emergency room with shortness of breath for last 1 week with chest heaviness and leg swelling.  Patient denies any fever cough abdominal pain nausea vomiting diarrhea.  Patient never been to this hospital before and followed by [facility] geriatric service.  Patient followed commands and answer all question appropriately and no her medications reviewed and refusing to take Coreg and losartan which is her home medications.     REVIEW OF SYSTEMS  Unremarkable except nausea and generalized weakness     PHYSICAL EXAM  Blood pressure 108/56, pulse 68, temperature 98 °F (36.7 °C), temperature source Oral, resp. rate 18, height 139.7 cm (55\"), weight 42.3 kg (93 lb 3.2 oz), SpO2 96 %.    GENERAL: Awake and alert, very pleasant nontoxic-appearing  HEENT:  Unremarkable  NECK:  Supple  CV: regular rhythm, regular rate  RESPIRATORY: normal effort, significant increase in work of breathing with minimal exertion with some rales in bases  ABDOMEN: soft nontender bowel sounds positive  MUSCULOSKELETAL: no deformity, trace pedal edema bilaterally   NEURO: alert, moves all extremities, follows commands  SKIN: warm, dry     LAB RESULTS  Lab Results (last 24 hours)     Procedure Component Value Units Date/Time    Basic Metabolic Panel [[id number]]  (Abnormal) Collected: 10/05/22 0508    Specimen: Blood Updated: 10/05/22 0539     Glucose 95 mg/dL      BUN 39 mg/dL      Creatinine 1.10 mg/dL      Sodium 138 mmol/L      Potassium 4.5 mmol/L      Chloride 104 mmol/L      CO2 26.0 mmol/L      Calcium 9.7 mg/dL      BUN/Creatinine Ratio 35.5     Anion Gap 8.0 mmol/L      eGFR 47.2 mL/min/1.73      Comment: National Kidney " Foundation and American Society of Nephrology (ASN) Task Force recommended calculation based on the Chronic Kidney Disease Epidemiology Collaboration (CKD-EPI) equation refit without adjustment for race.       Narrative:      GFR Normal >60  Chronic Kidney Disease <60  Kidney Failure <15      CBC & Differential [[id number]]  (Abnormal) Collected: 10/05/22 0508    Specimen: Blood Updated: 10/05/22 0522    Narrative:      The following orders were created for panel order CBC & Differential.  Procedure                               Abnormality         Status                     ---------                               -----------         ------                     CBC Auto Differential[[id number]]        Abnormal            Final result                 Please view results for these tests on the individual orders.    CBC Auto Differential [[id number]]  (Abnormal) Collected: 10/05/22 0508    Specimen: Blood Updated: 10/05/22 0522     WBC 7.00 10*3/mm3      RBC 4.14 10*6/mm3      Hemoglobin 11.9 g/dL      Hematocrit 36.4 %      MCV 87.9 fL      MCH 28.7 pg      MCHC 32.7 g/dL      RDW 13.8 %      RDW-SD 44.1 fl      MPV 10.3 fL      Platelets 149 10*3/mm3      Neutrophil % 66.4 %      Lymphocyte % 17.1 %      Monocyte % 9.9 %      Eosinophil % 5.9 %      Basophil % 0.4 %      Immature Grans % 0.3 %      Neutrophils, Absolute 4.65 10*3/mm3      Lymphocytes, Absolute 1.20 10*3/mm3      Monocytes, Absolute 0.69 10*3/mm3      Eosinophils, Absolute 0.41 10*3/mm3      Basophils, Absolute 0.03 10*3/mm3      Immature Grans, Absolute 0.02 10*3/mm3      nRBC 0.0 /100 WBC         Imaging Results (Last 24 Hours)     ** No results found for the last 24 hours. **        ECG 12 Lead  Component   Ref Range & Units 1 d ago    QT Interval   ms 524    Resulting Agency  [facility]             HEART RATE= 57  bpm  RR Interval= 1053  ms  HI Interval=   ms  P Horizontal Axis=   deg  P Front Axis=   deg  QRSD Interval= 154  ms  QT Interval= 524  ms  QRS  Axis= -57  deg  T Wave Axis= 102  deg  - ABNORMAL ECG -  Atrial flutter  LVH with IVCD, LAD and secondary repol abnrm  Probable inferior infarct, recent  Prolonged QT interval  No Prior Tracing for Comparison             Current Facility-Administered Medications:   •  [START ON 10/6/2022] amLODIPine (NORVASC) tablet 5 mg, 5 mg, Oral, Daily, Caity Prakash APRN  •  aspirin chewable tablet 81 mg, 81 mg, Oral, Daily, Ji Wagner MD, 81 mg at 10/05/22 0931  •  clidinium-chlordiazePOXIDE (LIBRAX) 5-2.5 MG per capsule 1 capsule, 1 capsule, Oral, BID, Ji Wagner MD, 1 capsule at 10/05/22 0931  •  famotidine (PEPCID) tablet 20 mg, 20 mg, Oral, Daily, Ji Wagner MD, 20 mg at 10/05/22 0931  •  HYDROcodone-acetaminophen (NORCO) 5-325 MG per tablet 1 tablet, 1 tablet, Oral, Q8H PRN, Ji Wagner MD  •  isosorbide mononitrate (IMDUR) 24 hr tablet 30 mg, 30 mg, Oral, Q24H, Ji Wagner MD, 30 mg at 10/05/22 0931  •  lactobacillus acidophilus (RISAQUAD) capsule 1 capsule, 1 capsule, Oral, Daily, Ji Wagner MD, 1 capsule at 10/05/22 0931  •  levothyroxine (SYNTHROID, LEVOTHROID) tablet 50 mcg, 50 mcg, Oral, Q AM, Ji Wagner MD, 50 mcg at 10/05/22 0552  •  nitroglycerin (NITROSTAT) SL tablet 0.4 mg, 0.4 mg, Sublingual, Q5 Min PRN, Ji Wagner MD  •  potassium chloride (K-DUR,KLOR-CON) ER tablet 20 mEq, 20 mEq, Oral, BID With Meals, Osmin Bennett MD, 20 mEq at 10/05/22 0931  •  [COMPLETED] Insert peripheral IV, , , Once **AND** sodium chloride 0.9 % flush 10 mL, 10 mL, Intravenous, PRN, Osmin Johansen MD  •  torsemide (DEMADEX) tablet 10 mg, 10 mg, Oral, Daily, Grazyna Rivera MD, 10 mg at 10/05/22 0931     ASSESSMENT  Acute on chronic diastolic congestive heart failure  Acute kidney injury  Severe aortic stenosis  Hypokalemia resolved  Hypertension  Hypothyroidism  Osteoarthritis  Chronic kidney disease stage III  Gastroesophageal reflux disease    PLAN  CPM  Resume diuretics   Strict I's and O's and daily  weight  Nephrology consult appreciated  Cardiology to follow patient  Adjust home medications  Stress ulcer DVT prophylaxis  Supportive care  PT/OT  Discussed with nursing staff and family  Subacute rehab once bed available    SOURAV DUTTON MD

## 2022-10-05 NOTE — PLAN OF CARE
Goal Outcome Evaluation:  Plan of Care Reviewed With: patient        Progress: declining  Outcome Evaluation: Pt was asleep upon entry, breakfast not touched; pt agreed to attempt short amb , but need for bsc limiting, assisted w/bsc and clean-up, new brief, pt then too fatigued and only able to take 4 shuffle steps bed>bsc>bed; pt w/initial post lean , but forw head; declined further amb due to incr fatigue, denied pain, will need SNU at [place], prior to adm pt lived alone per charting, unsafe currently to return to that setting    Patient was not wearing a face mask during this therapy encounter. Therapist used appropriate personal protective equipment including eye protection, mask, and gloves.  Mask used was standard procedure mask. Appropriate PPE was worn during the entire therapy session. Hand hygiene was completed before and after therapy session. Patient is not in enhanced droplet precautions.

## 2022-10-06 VITALS
BODY MASS INDEX: 20.27 KG/M2 | OXYGEN SATURATION: 96 % | DIASTOLIC BLOOD PRESSURE: 65 MMHG | HEIGHT: 55 IN | SYSTOLIC BLOOD PRESSURE: 130 MMHG | TEMPERATURE: 98 F | RESPIRATION RATE: 18 BRPM | WEIGHT: 87.6 LBS | HEART RATE: 69 BPM

## 2022-10-06 LAB
ANION GAP SERPL CALCULATED.3IONS-SCNC: 8.5 MMOL/L (ref 5–15)
BUN SERPL-MCNC: 43 MG/DL (ref 8–23)
BUN/CREAT SERPL: 33.1 (ref 7–25)
CALCIUM SPEC-SCNC: 10 MG/DL (ref 8.2–9.6)
CHLORIDE SERPL-SCNC: 102 MMOL/L (ref 98–107)
CO2 SERPL-SCNC: 30.5 MMOL/L (ref 22–29)
CREAT SERPL-MCNC: 1.3 MG/DL (ref 0.57–1)
EGFRCR SERPLBLD CKD-EPI 2021: 38.7 ML/MIN/1.73
GLUCOSE SERPL-MCNC: 117 MG/DL (ref 65–99)
POTASSIUM SERPL-SCNC: 4.7 MMOL/L (ref 3.5–5.2)
SODIUM SERPL-SCNC: 141 MMOL/L (ref 136–145)

## 2022-10-06 PROCEDURE — 99232 SBSQ HOSP IP/OBS MODERATE 35: CPT | Performed by: NURSE PRACTITIONER

## 2022-10-06 PROCEDURE — 80048 BASIC METABOLIC PNL TOTAL CA: CPT | Performed by: NURSE PRACTITIONER

## 2022-10-06 RX ORDER — HYDROCODONE BITARTRATE AND ACETAMINOPHEN 5; 325 MG/1; MG/1
1 TABLET ORAL EVERY 8 HOURS PRN
Qty: 10 TABLET | Refills: 0 | Status: SHIPPED | OUTPATIENT
Start: 2022-10-06 | End: 2022-10-09

## 2022-10-06 RX ORDER — POTASSIUM CHLORIDE 20 MEQ/1
20 TABLET, EXTENDED RELEASE ORAL 2 TIMES DAILY WITH MEALS
Qty: 60 TABLET | Refills: 0 | Status: SHIPPED | OUTPATIENT
Start: 2022-10-06 | End: 2022-11-05

## 2022-10-06 RX ORDER — FAMOTIDINE 20 MG/1
20 TABLET, FILM COATED ORAL
Qty: 60 TABLET | Refills: 0 | Status: SHIPPED | OUTPATIENT
Start: 2022-10-06 | End: 2022-11-05

## 2022-10-06 RX ORDER — TORSEMIDE 10 MG/1
10 TABLET ORAL DAILY
Qty: 30 TABLET | Refills: 0 | Status: SHIPPED | OUTPATIENT
Start: 2022-10-07 | End: 2022-11-06

## 2022-10-06 RX ORDER — ASPIRIN 81 MG/1
81 TABLET, CHEWABLE ORAL DAILY
Qty: 30 TABLET | Refills: 0 | Status: SHIPPED | OUTPATIENT
Start: 2022-10-07 | End: 2022-11-06

## 2022-10-06 RX ORDER — AMLODIPINE BESYLATE 5 MG/1
5 TABLET ORAL DAILY
Qty: 30 TABLET | Refills: 0 | Status: SHIPPED | OUTPATIENT
Start: 2022-10-07 | End: 2022-11-06

## 2022-10-06 RX ORDER — LEVOTHYROXINE SODIUM 0.05 MG/1
50 TABLET ORAL
Qty: 30 TABLET | Refills: 0 | Status: SHIPPED | OUTPATIENT
Start: 2022-10-07 | End: 2022-11-06

## 2022-10-06 RX ADMIN — POTASSIUM CHLORIDE 20 MEQ: 750 TABLET, EXTENDED RELEASE ORAL at 08:37

## 2022-10-06 RX ADMIN — Medication 1 CAPSULE: at 08:36

## 2022-10-06 RX ADMIN — TORSEMIDE 10 MG: 10 TABLET ORAL at 08:35

## 2022-10-06 RX ADMIN — CHLORDIAZEPOXIDE HYDROCHLORIDE AND CLIDINIUM BROMIDE 1 CAPSULE: 5; 2.5 CAPSULE ORAL at 08:36

## 2022-10-06 RX ADMIN — FAMOTIDINE 20 MG: 20 TABLET ORAL at 08:36

## 2022-10-06 RX ADMIN — AMLODIPINE BESYLATE 5 MG: 5 TABLET ORAL at 08:36

## 2022-10-06 RX ADMIN — ASPIRIN 81 MG: 81 TABLET, CHEWABLE ORAL at 08:36

## 2022-10-06 RX ADMIN — LEVOTHYROXINE SODIUM 50 MCG: 0.05 TABLET ORAL at 06:28

## 2022-10-06 NOTE — DISCHARGE INSTR - LAB
Will need BMP on Monday, October 10, 2022  Daily weights  Follow up with Dr. Pulido on November 3, 2022 at 1145AM

## 2022-10-06 NOTE — PLAN OF CARE
Goal Outcome Evaluation:  Plan of Care Reviewed With: patient        Progress: no change  Outcome Evaluation: Pt up to bsc and help reposition her several times through the night. Pt had no c/o pain. Pt up to standing scale. Resting well between care. CTM, safety maintained.  Diuretic in Use: Demadex  Response to Diuretics (Output greater than intake): greater   Daily Weight (up or down): down  O2 Requirements: RA  Functional Status (Activity level, tolerance and respiratory symptoms): maintaining  Discharge Plans:  possibly today

## 2022-10-06 NOTE — PLAN OF CARE
Goal Outcome Evaluation:  Plan of Care Reviewed With: patient, caregiver        Progress: improving  Outcome Evaluation: Patient has been approved for discharge to Rehab. Report called to Haylie at [facility]. Caregiver at bedside and will transport patient via private vehicle. All paperwork complete. Caregiver is gathering her belongings. And will let staff know when ready for transport to car. PCA gave patient complete bed bath and changed clothes. Lunch was provided.

## 2022-10-06 NOTE — PROGRESS NOTES
"Nutrition Services    Patient Name:  Joycelyn Esqueda  YOB: 1930  MRN: [id number]  Admit Date:  9/29/2022    Nutrition follow up.  Pt eager to go home and provided minimal information.  NFPE preformed. Patient meets criteria for : Moderate (non-severe) malnutrition related to Chronic illness based ASPEN/AND guidelines on the following: muscle wasting, fat loss, po intake and wt loss. Encouraged adequate po intake, pt declined all supplements, plans to d/c today.          CLINICAL NUTRITION ASSESSMENT      Reason for Assessment Nurse Admission Screen, Per Organizational Policy     H&P  Dx-CHF, Chest pain, HTN, hypothyroid     Current Problem Amy on CKD III     Encounter Information        Nutrition/Diet History:  10/1:  Nutrition consult from nursing admission screen (reported decreased intake and unintentional weight loss. Pt here with A/CHF, Chest pain. Po intake has varied from 0-50% pf her meals. Looks like she has lost about 5-8lbs recently. Currently eating lunch, Offered boost, milkshakes, she declined. C/o some difficulty chewing but doesn't want her food modified. Encouraged po.    Food Preferences:    Supplements:    Factors Affecting Intake: chewing difficulty, decreased appetite, Other:needs tray set up and meat cut     Anthropometrics        Current Height  Current Weight  BMI kg/m2 Height: 139.7 cm (55\")  Weight: 39.7 kg (87 lb 9.6 oz) (10/06/22 0545)  Body mass index is 20.36 kg/m².       Admission Weight 43.5kg   Ideal Body Weight (IBW) 42.4kg   Usual Body Weight (UBW) 103lb   Weight Change/Trend 16# (15.5%) wt loss in 9 months.       Weight History Wt Readings from Last 30 Encounters:   10/06/22 0545 39.7 kg (87 lb 9.6 oz)   10/05/22 1626 40.4 kg (89 lb 1.6 oz)   10/05/22 0554 42.3 kg (93 lb 3.2 oz)   10/04/22 0516 41.9 kg (92 lb 6.4 oz)   10/03/22 0510 40.4 kg (89 lb)   10/02/22 0500 43.2 kg (95 lb 3.8 oz)   10/01/22 0647 43.5 kg (95 lb 14.4 oz)   09/30/22 1447 45.8 kg (101 lb) "   09/29/22 1601 45.8 kg (101 lb)   02/15/22 1258 46.1 kg (101 lb 11.2 oz)   01/25/22 1436 46.8 kg (103 lb 3.2 oz)   05/06/17 1411 56.7 kg (125 lb)        Tests/Procedures        Tests/Procedures X-Ray     Labs       Pertinent Labs    Results from last 7 days   Lab Units 10/06/22  0416 10/05/22  0508 10/04/22  0428 10/02/22  0424 10/01/22  0416 09/30/22  0418 09/29/22  1558   SODIUM mmol/L 141 138 138   < > 140   < > 139   POTASSIUM mmol/L 4.7 4.5 4.4   < > 3.3*   < > 4.5   CHLORIDE mmol/L 102 104 101   < > 98   < > 104   CO2 mmol/L 30.5* 26.0 27.4   < > 29.9*   < > 22.9   BUN mg/dL 43* 39* 44*   < > 41*   < > 31*   CREATININE mg/dL 1.30* 1.10* 1.34*   < > 1.53*   < > 1.23*   CALCIUM mg/dL 10.0* 9.7* 9.9*   < > 10.2*   < > 10.0*   BILIRUBIN mg/dL  --   --   --   --  0.7  --  0.7   ALK PHOS U/L  --   --   --   --  87  --  92   ALT (SGPT) U/L  --   --   --   --  16  --  19   AST (SGOT) U/L  --   --   --   --  18  --  27   GLUCOSE mg/dL 117* 95 96   < > 117*   < > 110*    < > = values in this interval not displayed.     Results from last 7 days   Lab Units 10/05/22  0508 10/01/22  0417 10/01/22  0416 09/30/22  0418 09/29/22  1558   MAGNESIUM mg/dL  --   --   --   --  2.4*   HEMOGLOBIN g/dL 11.9*   < >  --    < > 11.2*   HEMATOCRIT % 36.4   < >  --    < > 35.9   WBC 10*3/mm3 7.00   < >  --    < > 5.79   TRIGLYCERIDES mg/dL  --   --  51  --   --     < > = values in this interval not displayed.     Results from last 7 days   Lab Units 10/05/22  0508 10/03/22  0402 10/02/22  0424 10/01/22  0417 09/30/22  0418 09/29/22  1558   INR   --   --   --   --   --  1.24*   APTT seconds  --   --   --   --   --  33.4   PLATELETS 10*3/mm3 149 162 171 177 166 160     No results found for: COVID19  Lab Results   Component Value Date    HGBA1C 6.00 (H) 10/01/2022          Medications           Scheduled Medications amLODIPine, 5 mg, Oral, Daily  aspirin, 81 mg, Oral, Daily  clidinium-chlordiazePOXIDE, 1 capsule, Oral, BID  famotidine, 20  mg, Oral, BID AC  lactobacillus acidophilus, 1 capsule, Oral, Daily  levothyroxine, 50 mcg, Oral, Q AM  potassium chloride, 20 mEq, Oral, BID With Meals  torsemide, 10 mg, Oral, Daily       Infusions     PRN Medications HYDROcodone-acetaminophen  •  ondansetron  •  [COMPLETED] Insert peripheral IV **AND** sodium chloride     Physical Findings        Physical Appearance alert, frail, generalized wasting, loss of muscle mass, loss of subcutaneous fat, oriented, Other:kyphotic     NFPE Consented to exam    --  Edema  lower extremity , 2+ (mild)   Gastrointestinal normoactive, last bowel movement:10/4   Tubes/Drains none   Oral/Mouth Cavity teeth missing   Skin skin intact   --  Malnutrition Severity Assessment      Patient meets criteria for : Moderate (non-severe) Malnutrition (related to Chronic illness based ASPEN/AND guidelines on the following: muscle wasting, fat loss, po intake and wt loss.)  Malnutrition Type (last 8 hours)     Malnutrition Severity Assessment     Row Name 10/06/22 1222       Malnutrition Severity Assessment    Malnutrition Type Chronic Disease - Related Malnutrition    Row Name 10/06/22 1222       Insufficient Energy Intake     Insufficient Energy Intake Findings Severe    Insufficient Energy Intake  <50% of est. energy requirement for >or equal to 1 month    Row Name 10/06/22 1222       Unintentional Weight Loss     Unintentional Weight Loss Findings Moderate    Unintentional Weight Loss  --  16# (15.5%) wt loss in 9 months.    Row Name 10/06/22 1222       Muscle Loss    Loss of Muscle Mass Findings Moderate    Voodoo Region Moderate - slight depression    Clavicle Bone Region Severe - protruding prominent bone    Acromion Bone Region Moderate - acromion may slightly protrude    Dorsal Hand Region Moderate - slight depression    Row Name 10/06/22 1222       Fat Loss    Subcutaneous Fat Loss Findings Moderate    Orbital Region  Moderate -  somewhat hollowness, slightly dark circles    Row Name  "10/06/22 1222       Fluid Accumulation (Edema)    Fluid Accumulation  Moderate equals 2+ pitting edema    Row Name 10/06/22 1222       Criteria Met (Must meet criteria for severity in at least 2 of these categories: M Wasting, Fat Loss, Fluid, Secondary Signs, Wt. Status, Intake)    Patient meets criteria for  Moderate (non-severe) Malnutrition  related to Chronic illness based ASPEN/AND guidelines on the following: muscle wasting, fat loss, po intake and wt loss.                   Current Nutrition Orders & Evaluation of Intake       Oral Nutrition     Food Allergies NKFA   Current PO Diet Diet Regular   Supplement n/a   PO Evaluation     Trending % PO Intake 50%       --  Estimated/Assessed Needs       Energy Requirements    Height for Calculation  Height: 139.7 cm (55\")   Weight for Calculation 39.7 kg (current)   Method for Estimation  25 kcal/kg, 30 kcal/kg   EST Needs (kcal/day) 993 - 1191 kcal/day       Protein Requirements    Weight for Calculation 39.7 kg (current)   EST Protein Needs (g/kg) 0.8 gm/kg   EST Daily Needs (g/day) 32 gm Pro/day       Fluid Requirements     Method for Estimation Defer to physician    Estimated Needs (mL/day)      PES STATEMENT / NUTRITION DIAGNOSIS      Nutrition Dx Problem  Problem: Inadequate Nutrient Intake  Etiology: Medical Diagnosis  Signs/Symptoms: Report of Minimal PO Intake    Comment:    --  NUTRITION INTERVENTION      Intervention Goal(s) Maintain nutrition status, Reduce/improve symptoms, Meet estimated needs, Disease management/therapy, Increase intake and Maintain weight         RD Intervention/Action Advise available snack, Supplement offered/refused, Encourage intake, Follow Tx Progress and Care plan reviewed         Prescription/Orders:       PO Diet       Supplements       Enteral Nutrition       Parenteral Nutrition    New Prescription Ordered? n/a   --      Monitor/Evaluation Per protocol, I&O, PO intake, Pertinent labs, Weight, Skin status, GI status "   Education Will instruct as appropriate   --    RD to follow per protocol.    Electronically signed by:  Genet Andre RD  10/06/22 09:18 EDT

## 2022-10-06 NOTE — PROGRESS NOTES
Continued Stay Note  [facility]     Patient Name: Joycelyn Esqueda  MRN: [id number]  Today's Date: 10/6/2022    Admit Date: 9/29/2022    Plan: [facility] cert APPROVED   Discharge Plan     Row Name 10/06/22 1308       Plan    Plan [facility] cert APPROVED    Plan Comments Spoke with juan a [place] approved for dc to [facility], bed is ready. MD notified. Patient's caregiver will transport. Packet in [employer]. [facility] is selected in [employer]    Final Discharge Disposition Code 03 - skilled nursing facility (SNF)    Final Note [facility] SNF               Discharge Codes    No documentation.               Expected Discharge Date and Time     Expected Discharge Date Expected Discharge Time    Oct 5, 2022             Santa [place] RN

## 2022-10-06 NOTE — NURSING NOTE
Patient discharged with all belongings, including rollator. Caregiver accompanied. No distress at time of discharge.

## 2022-10-06 NOTE — PROGRESS NOTES
[facility]  Cardiology Progress Note    Patient Identification:  Name: Joycelyn Esqueda  Age: 89 y.o.  Sex: female  :  1930  MRN: [id number]                 Follow Up / Chief Complaint: Aortic stenosis/CHF/chest heaviness    Interval History: Resting in bed with family at bedside.  She denies any chest pain or shortness of breath.  Blood pressure improved with decrease of amlodipine.      Objective:    Past Medical History:  Past Medical History:   Diagnosis Date   • Hypertension    • Thyroid disease      Past Surgical History:  No past surgical history on file.     Social History:   Social History     Tobacco Use   • Smoking status: Never Smoker   • Smokeless tobacco: Not on file   Substance Use Topics   • Alcohol use: No      Family History:  No family history on file.       Allergies:  Allergies   Allergen Reactions   • Penicillins      Scheduled Meds:  amLODIPine, 5 mg, Daily  aspirin, 81 mg, Daily  clidinium-chlordiazePOXIDE, 1 capsule, BID  famotidine, 20 mg, BID AC  lactobacillus acidophilus, 1 capsule, Daily  levothyroxine, 50 mcg, Q AM  potassium chloride, 20 mEq, BID With Meals  torsemide, 10 mg, Daily            INTAKE AND OUTPUT:    Intake/Output Summary (Last 24 hours) at 10/6/2022 1254  Last data filed at 10/6/2022 0830  Gross per 24 hour   Intake 580 ml   Output 650 ml   Net -70 ml       Review of Systems:    GI: Denies nausea and vomiting or abdominal pain  Cardiac: Denies chest pain and palpitations   pulmonary: Denies shortness of breath and cough    Constitutional:  Temp:  [98 °F (36.7 °C)-98.2 °F (36.8 °C)] 98 °F (36.7 °C)  Heart Rate:  [66-74] 69  Resp:  [18] 18  BP: (128-148)/(65-74) 130/65        Physical Exam:  General:  Appears in no acute distress, resting in bed  Eyes: eom normal no conjunctival drainage  HEENT:  No JVD. Thyroid not visibly enlarged. No mucosal pallor or cyanosis  Respiratory: Respirations regular and unlabored at rest. BBS with good air entry in  all fields. No crackles, rubs or wheezes auscultated  Cardiovascular: S1S2 Regular rate and rhythm. No murmur, rub or gallop. No carotid bruits. DP/PT pulses +2   . No pretibial pitting edema  Gastrointestinal: Abdomen soft, flat, non tender. Bowel sounds present. No hepatosplenomegaly. No ascites  Skin:   Skin warm and dry to touch. No rashes    Neuro: AAO x3 CN II-XII grossly intact  Psych: Mood and affect normal, pleasant and cooperative          I reviewed the patient's new clinical results, and personally reviewed and interpreted the patient's ECG and telemetry data from the last 24 hours    Cardiographics  Telemetry:   sr      ECG:     Echocardiogram:       Interpretation Summary    · Estimated right ventricular systolic pressure from tricuspid regurgitation is normal (<35 mmHg).  · Peak velocity of the flow distal to the aortic valve is 427.7 cm/s. Aortic valve maximum pressure gradient is 73.2 mmHg. Aortic valve mean pressure gradient is 41.5 mmHg. Aortic valve dimensionless index is 0.2 .  · Severe aortic valve stenosis is present. Aortic valve area is 0.63 cm2.  · Calculated left ventricular EF = 65.8% Estimated left ventricular EF was in agreement with the calculated left ventricular EF.  · Left ventricular diastolic function is consistent with (grade Ia w/high LAP) impaired relaxation.  · The right atrial cavity is borderline dilated.  · There is a small (<1cm) pericardial effusion.          Lab Review   Results from last 7 days   Lab Units 10/01/22  0416 09/30/22  0418 09/29/22  1904   TROPONIN T ng/mL 0.025 0.021 0.012     Results from last 7 days   Lab Units 09/29/22  1558   MAGNESIUM mg/dL 2.4*     Results from last 7 days   Lab Units 10/06/22  0416   SODIUM mmol/L 141   POTASSIUM mmol/L 4.7   BUN mg/dL 43*   CREATININE mg/dL 1.30*   CALCIUM mg/dL 10.0*        Results from last 7 days   Lab Units 10/05/22  0508 10/03/22  0402 10/02/22  0424   WBC 10*3/mm3 7.00 8.03 8.15   HEMOGLOBIN g/dL 11.9* 11.4*  "12.2   HEMATOCRIT % 36.4 35.7 38.0   PLATELETS 10*3/mm3 149 162 171     Results from last 7 days   Lab Units 09/29/22  1558   INR  1.24*   APTT seconds 33.4       Intake/Output Summary (Last 24 hours) at 10/6/2022 1254  Last data filed at 10/6/2022 0830  Gross per 24 hour   Intake 580 ml   Output 650 ml   Net -70 ml     Assessment:  Acute heart failure, volume overloaded: EF 65% with severe aortic stenosis.  Aortic stenosis: she declines any testing.  Hypertension: stable  SIENNA on CKD: Nephrology following   Chest pain: Resolved.    Plan:     SR on the monitor.  No events overnight.  Blood pressure improved with decrease of amlodipine.   Now on p.o. torsemide, Nephrology following.   Denies any chest pain or shortness of breath.  We will continue medical management and no changes at this time.           Mae Schwab, APRN  )10/6/2022       EMR Dragon/Transcription:   \"Dictated utilizing Dragon dictation\".     "

## 2022-10-06 NOTE — DISCHARGE SUMMARY
Discharge summary    Date of admission 9/29/2022  Date of discharge 10/6/2022    Final diagnosis  Acute on chronic diastolic congestive heart failure  Acute kidney injury resolved  Severe aortic stenosis  Hypokalemia resolved  Hypertension  Hypothyroidism  Osteoarthritis  Chronic kidney disease stage III  Gastroesophageal reflux disease    Discharge medications    Current Facility-Administered Medications:   •  amLODIPine (NORVASC) tablet 5 mg, 5 mg, Oral, Daily, Caity Prakash APRN, 5 mg at 10/06/22 0836  •  aspirin chewable tablet 81 mg, 81 mg, Oral, Daily, Ji Wagner MD, 81 mg at 10/06/22 0836  •  clidinium-chlordiazePOXIDE (LIBRAX) 5-2.5 MG per capsule 1 capsule, 1 capsule, Oral, BID, Ji Wagner MD, 1 capsule at 10/06/22 0836  •  famotidine (PEPCID) tablet 20 mg, 20 mg, Oral, BID AC, Ji Wagner MD, 20 mg at 10/06/22 0836  •  lactobacillus acidophilus (RISAQUAD) capsule 1 capsule, 1 capsule, Oral, Daily, Ji Wagner MD, 1 capsule at 10/06/22 0836  •  levothyroxine (SYNTHROID, LEVOTHROID) tablet 50 mcg, 50 mcg, Oral, Q AM, Ji Wagner MD, 50 mcg at 10/06/22 0628  •  potassium chloride (K-DUR,KLOR-CON) ER tablet 20 mEq, 20 mEq, Oral, BID With Meals, Osmin Bennett MD, 20 mEq at 10/06/22 0837  •  [COMPLETED] Insert peripheral IV, , , Once **AND** sodium chloride 0.9 % flush 10 mL, 10 mL, Intravenous, PRN, Osmin Johansen MD  •  torsemide (DEMADEX) tablet 10 mg, 10 mg, Oral, Daily, Grazyna Rivera MD, 10 mg at 10/06/22 0835     Consults obtained  Cardiology  Nephrology  Nutrition      Procedures  2D echo which showed ejection fraction 65% and severe aortic stenosis    Hospital course  89-year-old  female with severe aortic stenosis diastolic congestive heart failure and chronic kidney disease stage III admitted through emergency room with shortness of breath.  Patient work-up revealed acute on chronic congestive heart failure.  Patient also complained of chest pain and she is ruled  out for MI of enzymes and EKG.  Patient followed by cardiology and nephrology.  Patient has low potassium which is replaced and her kidney function deteriorated and her diuretics held and restarted at the time of discharge.  Patient is back to baseline and cleared for discharge.    Discharge diet regular    Activity per PT OT    Medication as above    Further care per accepting physician at nursing home and follow-up with cardiology nephrology per their instruction and take medication as directed.    SOURAV DUTTON MD

## 2022-10-06 NOTE — PROGRESS NOTES
"Daily progress note    Primary care physician      Chief complaint  Doing same with no new complaints     History of present illness  89-year-old white female with history of hypertension hypothyroidism osteoarthritis and gastroesophageal disease presented to [facility] emergency room with shortness of breath for last 1 week with chest heaviness and leg swelling.  Patient denies any fever cough abdominal pain nausea vomiting diarrhea.  Patient never been to this hospital before and followed by [facility] geriatric service.  Patient followed commands and answer all question appropriately and no her medications reviewed and refusing to take Coreg and losartan which is her home medications.     REVIEW OF SYSTEMS  Unremarkable      PHYSICAL EXAM  Blood pressure 130/65, pulse 69, temperature 98 °F (36.7 °C), temperature source Oral, resp. rate 18, height 139.7 cm (55\"), weight 39.7 kg (87 lb 9.6 oz), SpO2 96 %.    GENERAL: Awake and alert, very pleasant nontoxic-appearing  HEENT:  Unremarkable  NECK:  Supple  CV: regular rhythm, regular rate  RESPIRATORY: normal effort, significant increase in work of breathing with minimal exertion with some rales in bases  ABDOMEN: soft nontender bowel sounds positive  MUSCULOSKELETAL: no deformity, trace pedal edema bilaterally   NEURO: alert, moves all extremities, follows commands  SKIN: warm, dry     LAB RESULTS  Lab Results (last 24 hours)     Procedure Component Value Units Date/Time    Basic Metabolic Panel [[id number]]  (Abnormal) Collected: 10/06/22 0416    Specimen: Blood Updated: 10/06/22 0505     Glucose 117 mg/dL      BUN 43 mg/dL      Creatinine 1.30 mg/dL      Sodium 141 mmol/L      Potassium 4.7 mmol/L      Chloride 102 mmol/L      CO2 30.5 mmol/L      Calcium 10.0 mg/dL      BUN/Creatinine Ratio 33.1     Anion Gap 8.5 mmol/L      eGFR 38.7 mL/min/1.73      Comment: National Kidney Foundation and American Society of Nephrology (ASN) " Task Force recommended calculation based on the Chronic Kidney Disease Epidemiology Collaboration (CKD-EPI) equation refit without adjustment for race.       Narrative:      GFR Normal >60  Chronic Kidney Disease <60  Kidney Failure <15          Imaging Results (Last 24 Hours)     ** No results found for the last 24 hours. **        ECG 12 Lead  Component   Ref Range & Units 1 d ago    QT Interval   ms 524    Resulting Agency  ECG             HEART RATE= 57  bpm  RR Interval= 1053  ms  AL Interval=   ms  P Horizontal Axis=   deg  P Front Axis=   deg  QRSD Interval= 154  ms  QT Interval= 524  ms  QRS Axis= -57  deg  T Wave Axis= 102  deg  - ABNORMAL ECG -  Atrial flutter  LVH with IVCD, LAD and secondary repol abnrm  Probable inferior infarct, recent  Prolonged QT interval  No Prior Tracing for Comparison             Current Facility-Administered Medications:   •  amLODIPine (NORVASC) tablet 5 mg, 5 mg, Oral, Daily, Caity Prakash APRN, 5 mg at 10/06/22 0836  •  aspirin chewable tablet 81 mg, 81 mg, Oral, Daily, Ji Wagner MD, 81 mg at 10/06/22 0836  •  clidinium-chlordiazePOXIDE (LIBRAX) 5-2.5 MG per capsule 1 capsule, 1 capsule, Oral, BID, Ji Wagner MD, 1 capsule at 10/06/22 0836  •  famotidine (PEPCID) tablet 20 mg, 20 mg, Oral, BID AC, Ji Wagner MD, 20 mg at 10/06/22 0836  •  HYDROcodone-acetaminophen (NORCO) 5-325 MG per tablet 1 tablet, 1 tablet, Oral, Q8H PRN, Ji Wagner MD  •  lactobacillus acidophilus (RISAQUAD) capsule 1 capsule, 1 capsule, Oral, Daily, Ji Wagner MD, 1 capsule at 10/06/22 0836  •  levothyroxine (SYNTHROID, LEVOTHROID) tablet 50 mcg, 50 mcg, Oral, Q AM, Ji Wagner MD, 50 mcg at 10/06/22 0628  •  ondansetron (ZOFRAN) injection 4 mg, 4 mg, Intravenous, Q4H PRN, Ji Wagner MD, 4 mg at 10/05/22 1423  •  potassium chloride (K-DUR,KLOR-CON) ER tablet 20 mEq, 20 mEq, Oral, BID With Meals, Osmin Bennett MD, 20 mEq at 10/06/22 0837  •  [COMPLETED] Insert peripheral IV, ,  , Once **AND** sodium chloride 0.9 % flush 10 mL, 10 mL, Intravenous, PRN, Osmin Johansen MD  •  torsemide (DEMADEX) tablet 10 mg, 10 mg, Oral, Daily, Grazyna Rivera MD, 10 mg at 10/06/22 0835     ASSESSMENT  Acute on chronic diastolic congestive heart failure  Acute kidney injury resolved  Severe aortic stenosis  Hypokalemia resolved  Hypertension  Hypothyroidism  Osteoarthritis  Chronic kidney disease stage III  Gastroesophageal reflux disease    PLAN  Discharge to subacute rehab  Discharge summary dictated    SOURAV DUTTON MD

## 2022-10-06 NOTE — PROGRESS NOTES
[facility] Progress Note      Patient Name: Joycelyn Esqueda  : 1930  MRN: [id number]  Primary Care Physician:  Giles Wheeler MD  Date of admission: 2022    Subjective     Interval History:   Follow up SIENNA on CKD III. Slept fair. Back pain, very uncomfortable in bed.      Last bm 2 days ago. Weight down .  Review of Systems:   As noted above    Objective     Vitals:   Temp:  [97.4 °F (36.3 °C)-98.2 °F (36.8 °C)] 98.1 °F (36.7 °C)  Heart Rate:  [63-74] 66  Resp:  [18] 18  BP: ()/(51-74) 128/65    Intake/Output Summary (Last 24 hours) at 10/6/2022 0743  Last data filed at 10/6/2022 0000  Gross per 24 hour   Intake 800 ml   Output 850 ml   Net -50 ml       Physical Exam:    General Appearance: alert, oriented x 3, no acute distress . Frail.   Skin: warm and dry  HEENT: oral mucosa normal, nonicteric sclera  Neck: supple, no JVD  Lungs: Clear to auscultation anteriorly .  Heart: RRR, 3/6 syst m. no s3 or rub  Abdomen: soft, nontender, nondistended. + bs  Extremities: trace lower ext edema   Neuro: normal speech and mental status     Scheduled Meds:     amLODIPine, 5 mg, Oral, Daily  aspirin, 81 mg, Oral, Daily  clidinium-chlordiazePOXIDE, 1 capsule, Oral, BID  famotidine, 20 mg, Oral, BID AC  lactobacillus acidophilus, 1 capsule, Oral, Daily  levothyroxine, 50 mcg, Oral, Q AM  potassium chloride, 20 mEq, Oral, BID With Meals  torsemide, 10 mg, Oral, Daily      IV Meds:        Results Reviewed:   I have personally reviewed the results from the time of this admission to 10/6/2022 07:43 EDT     Results from last 7 days   Lab Units 10/06/22  0416 10/05/22  0508 10/04/22  0428 10/02/22  0424 10/01/22  0416 22  0418 22  1558   SODIUM mmol/L 141 138 138   < > 140   < > 139   POTASSIUM mmol/L 4.7 4.5 4.4   < > 3.3*   < > 4.5   CHLORIDE mmol/L 102 104 101   < > 98   < > 104   CO2 mmol/L 30.5* 26.0 27.4   < > 29.9*   < > 22.9   BUN mg/dL 43* 39* 44*   < > 41*    < > 31*   CREATININE mg/dL 1.30* 1.10* 1.34*   < > 1.53*   < > 1.23*   CALCIUM mg/dL 10.0* 9.7* 9.9*   < > 10.2*   < > 10.0*   BILIRUBIN mg/dL  --   --   --   --  0.7  --  0.7   ALK PHOS U/L  --   --   --   --  87  --  92   ALT (SGPT) U/L  --   --   --   --  16  --  19   AST (SGOT) U/L  --   --   --   --  18  --  27   GLUCOSE mg/dL 117* 95 96   < > 117*   < > 110*    < > = values in this interval not displayed.       Estimated Creatinine Clearance: 17.3 mL/min (A) (by C-G formula based on SCr of 1.3 mg/dL (H)).    Results from last 7 days   Lab Units 09/29/22  1558   MAGNESIUM mg/dL 2.4*             Results from last 7 days   Lab Units 10/05/22  0508 10/03/22  0402 10/02/22  0424 10/01/22  0417 09/30/22  0418   WBC 10*3/mm3 7.00 8.03 8.15 8.15 6.79   HEMOGLOBIN g/dL 11.9* 11.4* 12.2 12.2 11.2*   PLATELETS 10*3/mm3 149 162 171 177 166       Results from last 7 days   Lab Units 09/29/22  1558   INR  1.24*       Assessment / Plan     ASSESSMENT:  1. Amy on CKD III. Creatinine stable .  Baseline 1.2-1.4. Weight down with addition of low dose diuretic .  Cautious with diuretic given her severe AS, but I am concerned that she will decompensate from heart failure if not on any diuretic.    2. Chronic diastolic heart failure . Continue  low dose torsemide today .  3. HTN controlled .  4. Severe AS.  5. Monoclonal gammopathy .    PLAN:  1. Torsemide 10 mg daily.  2.  If dc, will need BMP on Monday Oct 10.   3. Has follow up with Dr. Sanjiv Pulido Nov 3 at 11:45 am.     Grazyna Rivera MD  10/06/22  07:43 EDT    [facility]  [phone]

## 2022-10-07 NOTE — PAYOR COMM NOTE
"Joycelyn Saunders (89 y.o. Female)     PLEASE SEE ATTACHED DC SUMMARY      REF#[id number]    THANK YOU    GLENIS MACHADO LPN CCP            Date of Birth   08/13/1930    Social Security Number       Address   [address] [place] [address]    Home Phone   [phone]    MRN   [id number]       Islam   None    Marital Status                               Admission Date   9/29/22    Admission Type   Emergency    Admitting Provider   Ji Wagner MD    Attending Provider       Department, Room/Bed   [address], [address]/1       Discharge Date   10/6/2022    Discharge Disposition   Skilled Nursing Facility (DC - External)    Discharge Destination                               Attending Provider: (none)   Allergies: Penicillins    Isolation: None   Infection: None   Code Status: Prior   Advance Care Planning Activity    Ht: 139.7 cm (55\")   Wt: 39.7 kg (87 lb 9.6 oz)    Admission Cmt: None   Principal Problem: None                Active Insurance as of 9/29/2022     Primary Coverage     Payor Plan Insurance Group Employer/Plan Group    AETNA MEDICARE REPLACEMENT AETNA MEDICARE REPLACEMENT [id number]     Payor Plan Address Payor Plan Phone Number Payor Plan Fax Number Effective Dates    [address] [phone]  1/1/2022 - None Entered    [facility] [id number]       Subscriber Name Subscriber Birth Date Member ID       Joycelyn Saunders 8/13/1930 [id number]           Secondary Coverage     Payor Plan Insurance Group Employer/Plan Group    [employer]HEM BLUE CROSS ANTHEM BLUE CROSS BLUE SHIELD PPO [id number]     Payor Plan Address Payor Plan Phone Number Payor Plan Fax Number Effective Dates    [address] [phone]  1/1/2021 - None Entered    [facility] [id number]       Subscriber Name Subscriber Birth Date Member ID       JOYCELYN SAUNDERS 8/13/1930 [id number]                 Emergency Contacts      (Rel.) Home Phone Work Phone Mobile Phone    " Cayla Batista (Friend) [phone] -- [phone]    Ruddy(financial POA)Rochelle (Friend) -- -- [phone]               Discharge Summary      Ji Wagner MD at 10/06/22 1332        Discharge summary    Date of admission 9/29/2022  Date of discharge 10/6/2022    Final diagnosis  Acute on chronic diastolic congestive heart failure  Acute kidney injury resolved  Severe aortic stenosis  Hypokalemia resolved  Hypertension  Hypothyroidism  Osteoarthritis  Chronic kidney disease stage III  Gastroesophageal reflux disease    Discharge medications    Current Facility-Administered Medications:   •  amLODIPine (NORVASC) tablet 5 mg, 5 mg, Oral, Daily, Caity Prakash APRN, 5 mg at 10/06/22 0836  •  aspirin chewable tablet 81 mg, 81 mg, Oral, Daily, Ji Wagner MD, 81 mg at 10/06/22 0836  •  clidinium-chlordiazePOXIDE (LIBRAX) 5-2.5 MG per capsule 1 capsule, 1 capsule, Oral, BID, Ji Wagner MD, 1 capsule at 10/06/22 0836  •  famotidine (PEPCID) tablet 20 mg, 20 mg, Oral, BID AC, Ji Wagner MD, 20 mg at 10/06/22 0836  •  lactobacillus acidophilus (RISAQUAD) capsule 1 capsule, 1 capsule, Oral, Daily, Ji Wagner MD, 1 capsule at 10/06/22 0836  •  levothyroxine (SYNTHROID, LEVOTHROID) tablet 50 mcg, 50 mcg, Oral, Q AM, Ji Wagner MD, 50 mcg at 10/06/22 0628  •  potassium chloride (K-DUR,KLOR-CON) ER tablet 20 mEq, 20 mEq, Oral, BID With Meals, Osmin Bennett MD, 20 mEq at 10/06/22 0837  •  [COMPLETED] Insert peripheral IV, , , Once **AND** sodium chloride 0.9 % flush 10 mL, 10 mL, Intravenous, PRN, Osmin Johansen MD  •  torsemide (DEMADEX) tablet 10 mg, 10 mg, Oral, Daily, Grazyna Rivera MD, 10 mg at 10/06/22 0835     Consults obtained  Cardiology  Nephrology  Nutrition      Procedures  2D echo which showed ejection fraction 65% and severe aortic stenosis    Hospital course  89-year-old  female with severe aortic stenosis diastolic congestive heart failure and chronic kidney  disease stage III admitted through emergency room with shortness of breath.  Patient work-up revealed acute on chronic congestive heart failure.  Patient also complained of chest pain and she is ruled out for MI of enzymes and EKG.  Patient followed by cardiology and nephrology.  Patient has low potassium which is replaced and her kidney function deteriorated and her diuretics held and restarted at the time of discharge.  Patient is back to baseline and cleared for discharge.    Discharge diet regular    Activity per PT OT    Medication as above    Further care per accepting physician at nursing home and follow-up with cardiology nephrology per their instruction and take medication as directed.    SOURAV DUTTON MD    Electronically signed by Sourav Dutton MD at 10/06/22 0206       Discharge Order (From admission, onward)     Start     Ordered    10/06/22 1331  Discharge patient  Once        Expected Discharge Date: 10/06/22    Discharge Disposition: Skilled Nursing Facility (DC - External)    Physician of Record for Attribution - Please select from Treatment Team: SOURAV DUTTON [[id number]]    Review needed by CMO to determine Physician of Record: No       Question Answer Comment   Physician of Record for Attribution - Please select from Treatment Team SOURAV DUTTON    Review needed by CMO to determine Physician of Record No        10/06/22 5883

## 2023-01-20 ENCOUNTER — OFFICE VISIT (OUTPATIENT)
Dept: CARDIOLOGY | Facility: CLINIC | Age: 88
End: 2023-01-20
Payer: MEDICARE

## 2023-01-20 VITALS
WEIGHT: 96 LBS | SYSTOLIC BLOOD PRESSURE: 141 MMHG | HEIGHT: 56 IN | HEART RATE: 66 BPM | DIASTOLIC BLOOD PRESSURE: 68 MMHG | BODY MASS INDEX: 21.59 KG/M2

## 2023-01-20 DIAGNOSIS — I50.42 CHRONIC COMBINED SYSTOLIC AND DIASTOLIC CONGESTIVE HEART FAILURE: Primary | ICD-10-CM

## 2023-01-20 DIAGNOSIS — I10 PRIMARY HYPERTENSION: ICD-10-CM

## 2023-01-20 DIAGNOSIS — I35.0 AORTIC VALVE STENOSIS, ETIOLOGY OF CARDIAC VALVE DISEASE UNSPECIFIED: ICD-10-CM

## 2023-01-20 PROCEDURE — 99214 OFFICE O/P EST MOD 30 MIN: CPT | Performed by: NURSE PRACTITIONER

## 2023-01-20 PROCEDURE — 93000 ELECTROCARDIOGRAM COMPLETE: CPT | Performed by: NURSE PRACTITIONER

## 2023-01-20 RX ORDER — AMLODIPINE BESYLATE 2.5 MG/1
2.5 TABLET ORAL DAILY
COMMUNITY
Start: 2023-01-12 | End: 2023-01-20

## 2023-01-20 RX ORDER — LEVOTHYROXINE SODIUM 0.05 MG/1
50 TABLET ORAL DAILY
COMMUNITY
Start: 2022-10-17

## 2023-01-20 RX ORDER — CARVEDILOL 3.12 MG/1
3.12 TABLET ORAL DAILY
Qty: 60 TABLET | Refills: 3 | Status: SHIPPED | OUTPATIENT
Start: 2023-01-20 | End: 2023-03-01

## 2023-01-20 RX ORDER — CYPROHEPTADINE HYDROCHLORIDE 4 MG/1
4 TABLET ORAL 3 TIMES DAILY
COMMUNITY
Start: 2022-12-01

## 2023-01-20 RX ORDER — CARVEDILOL 3.12 MG/1
3.12 TABLET ORAL ONCE
Qty: 60 TABLET | Refills: 3 | Status: SHIPPED | OUTPATIENT
Start: 2023-01-20 | End: 2023-01-20

## 2023-01-20 RX ORDER — CHLORDIAZEPOXIDE HYDROCHLORIDE 5 MG/1
CAPSULE, GELATIN COATED ORAL
COMMUNITY
Start: 2023-01-12

## 2023-01-20 NOTE — PROGRESS NOTES
Subjective:        Joycelyn Esqueda is a 89 y.o. female who here for follow up    Chief Complaint   Patient presents with   • Congestive Heart Failure     NP REFERBY DR GALVAN  CHF       HPI    This is a 89-year-old female who is well-known to our service.  She recently was hospitalized in October for combined congestive heart failure and she presents today for management.  He also follows neurology in the outpatient setting.    On her hospitalization in October she was seen for acute on chronic diastolic congestive heart failure, severe aortic stenosis, acute kidney injury, hyper kalemia, hypertension, hypothyroidism, osteoarthritis and CKD.  Noted to have chest pain but it was ruled out.  In the hospital, she declined any further testing for aortic stenosis.  Conservative management was noted.    Echo September 2022 revealed small pericardial effusion, RAC borderline dilated, LV diastolic function is consistent with grade 1 with high LAP.  EF 65.8%, severe aortic valve stenosis is present.  Peak velocity of flow distal to the aortic valve is 427.7 cm/S.  See full report as below.    The following portions of the patient's history were reviewed and updated as appropriate: allergies, current medications, past family history, past medical history, past social history, past surgical history and problem list.    Past Medical History:   Diagnosis Date   • CHF (congestive heart failure) (HCC)    • Hypertension    • Thyroid disease          reports that she has never smoked. She does not have any smokeless tobacco history on file. She reports that she does not drink alcohol.     History reviewed. No pertinent family history.    ROS     Review of Systems  Constitutional: No wt loss, fever, fatigue  Gastrointestinal: No nausea, abdominal pain  Behavioral/Psych: No insomnia or anxiety  Cardiovascular: Denies chest pain, and shortness of breath.      Objective:           Vitals and nursing note reviewed.    Constitutional:       Appearance: Well-developed.   HENT:      Head: Normocephalic.      Right Ear: External ear normal.      Left Ear: External ear normal.   Neck:      Vascular: No JVD.   Pulmonary:      Effort: Pulmonary effort is normal. No respiratory distress.      Breath sounds: Normal breath sounds. No stridor. No rales.   Cardiovascular:      Normal rate. Regular rhythm.      No gallop.   Pulses:     Intact distal pulses.   Edema:     Peripheral edema absent.   Abdominal:      General: Bowel sounds are normal. There is no distension.      Palpations: Abdomen is soft.      Tenderness: There is no abdominal tenderness. There is no guarding.   Musculoskeletal: Normal range of motion.         General: No tenderness.      Cervical back: Normal range of motion. Skin:     General: Skin is warm.   Neurological:      Mental Status: Alert and oriented to person, place, and time.      Deep Tendon Reflexes: Reflexes are normal and symmetric.   Psychiatric:         Judgment: Judgment normal.           ECG 12 Lead    Date/Time: 1/20/2023 12:04 PM  Performed by: Mae Schwab APRN  Authorized by: Mae Schwab APRN   Comparison: compared with previous ECG from 10/1/2022  Similar to previous ECG  Rhythm: sinus rhythm  Rate: normal  BPM: 67  Other findings: non-specific ST-T wave changes and left ventricular hypertrophy    Clinical impression: non-specific ECG            9/30/22  Interpretation Summary    • Estimated right ventricular systolic pressure from tricuspid regurgitation is normal (<35 mmHg).  • Peak velocity of the flow distal to the aortic valve is 427.7 cm/s. Aortic valve maximum pressure gradient is 73.2 mmHg. Aortic valve mean pressure gradient is 41.5 mmHg. Aortic valve dimensionless index is 0.2 .  • Severe aortic valve stenosis is present. Aortic valve area is 0.63 cm2.  • Calculated left ventricular EF = 65.8% Estimated left ventricular EF was in agreement with the calculated left  ventricular EF.  • Left ventricular diastolic function is consistent with (grade Ia w/high LAP) impaired relaxation.  • The right atrial cavity is borderline dilated.  • There is a small (<1cm) pericardial effusion.      Current Outpatient Medications:   •  amLODIPine (NORVASC) 2.5 MG tablet, Take 2.5 mg by mouth Daily., Disp: , Rfl:   •  chlordiazePOXIDE (LIBRIUM) 5 MG capsule, TAKE 1 CAPSULE BY MOUTH 3 (THREE) TIMES DAILY AS NEEDED FOR ANXIETY. MAX DAILY AMOUNT: 15 MG, Disp: , Rfl:   •  cyproheptadine (PERIACTIN) 4 MG tablet, Take 4 mg by mouth 3 (Three) Times a Day., Disp: , Rfl:   •  levothyroxine (SYNTHROID, LEVOTHROID) 50 MCG tablet, Take 50 mcg by mouth Daily., Disp: , Rfl:   •  PROBIOTIC PRODUCT PO, Take  by mouth., Disp: , Rfl:   •  cholecalciferol (VITAMIN D3) 25 MCG (1000 UT) tablet, Take 1,000 Units by mouth Daily., Disp: , Rfl:   •  clidinium-chlordiazePOXIDE (LIBRAX) 5-2.5 MG per capsule, Take 1 capsule by mouth 2 (Two) Times a Day., Disp: , Rfl:   •  levothyroxine (SYNTHROID, LEVOTHROID) 50 MCG tablet, Take 1 tablet by mouth Every Morning for 30 days., Disp: 30 tablet, Rfl: 0  •  torsemide (DEMADEX) 10 MG tablet, Take 1 tablet by mouth Daily for 30 days., Disp: 30 tablet, Rfl: 0     Assessment:        Patient Active Problem List   Diagnosis   • CHF (congestive heart failure) (HCC)   • Combined systolic and diastolic congestive heart failure, unspecified HF chronicity (HCC)               Plan:   1.  Chronic combined CHF with severe aortic stenosis.  She denies any shortness of breath today.  Continue torsemide per nephrology.  Unable to add some goal-directed therapy due to age. I will stop Norvasc and add coreg. She will monitor her blood pressure and HR .     2.  Aortic stenosis: Conservative management.      3. Hypertension: stop loaded pain and start carvedilol.  She will do a blood pressure diary and monitor her heart rate and blood pressure and bring it with her to her next visit.  If she has any  issues she will call the office.    Educated patient on exercising for at least 30 minutes a day for 2 to 3 days a week. Importance of controlling hypertension and blood pressure checkup on the regular basis has been explained. Hypertension as a silent killer has been discussed. Risk reduction of the weight and regular exercises to control the hypertension has been explained.     .medsPros and cons of this new medication / change medication has been explained to  the patient. Possible side effects has been explained. Associated need of the blood  Work has been explained. Need for the compliance of the medication has been explained.      No diagnosis found.    There are no diagnoses linked to this encounter.    COUNSELING: lynda Escobar was given to patient for the following topics: diagnostic results, risk factor reductions, impressions, risks and benefits of treatment options and importance of treatment compliance .       SMOKING COUNSELING: Denies  Add coreg once daily and discontinue norvasc. I will see in one month then add coreg bid if able. She will do a blood pressure/HR diary and bring it with her to her next visit.    Sincerely,   JOSÉ MIGUEL Corbett  [facility]  01/20/23  11:59 EST    EMR Dragon/Transcription disclaimer:   Much of this encounter note is an electronic transcription/translation of spoken language to printed text. The electronic translation of spoken language may permit erroneous, or at times, nonsensical words or phrases to be inadvertently transcribed; Although I have reviewed the note for such errors, some may still exist.

## 2023-01-31 ENCOUNTER — TELEPHONE (OUTPATIENT)
Dept: CARDIOLOGY | Facility: CLINIC | Age: 88
End: 2023-01-31
Payer: COMMERCIAL

## 2023-02-06 NOTE — TELEPHONE ENCOUNTER
Patient called stating that she is not taking the Carvedilol and is only taking the Amlodipine and Torsemide. Her blood pressure is 115/56 with heart rate of 55.     Advise patient to continue to monitor blood pressure and heart and follow up with her appointment on the 2/17/23 patient understood.

## 2023-03-01 ENCOUNTER — OFFICE VISIT (OUTPATIENT)
Dept: CARDIOLOGY | Facility: CLINIC | Age: 88
End: 2023-03-01
Payer: MEDICARE

## 2023-03-01 VITALS
DIASTOLIC BLOOD PRESSURE: 72 MMHG | HEIGHT: 56 IN | SYSTOLIC BLOOD PRESSURE: 137 MMHG | BODY MASS INDEX: 21.37 KG/M2 | WEIGHT: 95 LBS | HEART RATE: 75 BPM

## 2023-03-01 DIAGNOSIS — I50.42 CHRONIC COMBINED SYSTOLIC AND DIASTOLIC CONGESTIVE HEART FAILURE: ICD-10-CM

## 2023-03-01 DIAGNOSIS — I10 PRIMARY HYPERTENSION: ICD-10-CM

## 2023-03-01 DIAGNOSIS — I35.0 AORTIC VALVE STENOSIS, ETIOLOGY OF CARDIAC VALVE DISEASE UNSPECIFIED: Primary | ICD-10-CM

## 2023-03-01 PROCEDURE — 99214 OFFICE O/P EST MOD 30 MIN: CPT | Performed by: NURSE PRACTITIONER

## 2023-03-01 RX ORDER — AMLODIPINE BESYLATE 2.5 MG/1
2.5 TABLET ORAL DAILY
Qty: 30 TABLET | Refills: 11 | COMMUNITY
Start: 2023-01-12 | End: 2024-01-12

## 2023-03-01 NOTE — PROGRESS NOTES
Subjective:        Joycelyn Esqueda is a 89 y.o. female who here for follow up    No chief complaint on file.    For recheck blood pressure    HPI    This is a 89-year-old female who is well-known to our service.  She is here today for a recheck of her blood pressure after carvedilol was added once daily.  This visit we were going to try to see if we could add it twice daily due to her congestive heart failure.  She has been able to take this medication okay. Blood pressures at home-reviewed.  Today blood pressure 137/72 with a heart rate of 75.    She recently was hospitalized in October for combined congestive heart failure and she presents today for management.  She also follows neurology in the outpatient setting.    On her hospitalization in October she was seen for acute on chronic diastolic congestive heart failure, severe aortic stenosis, acute kidney injury, hyper kalemia, hypertension, hypothyroidism, osteoarthritis and CKD.  Noted to have chest pain but it was ruled out.  In the hospital, she declined any further testing for aortic stenosis.  Conservative management was noted.    Echo September 2022 revealed small pericardial effusion, RAC borderline dilated, LV diastolic function is consistent with grade 1 with high LAP.  EF 65.8%, severe aortic valve stenosis is present.  Peak velocity of flow distal to the aortic valve is 427.7 cm/S.  See full report as below.    Testing remains relevant    The following portions of the patient's history were reviewed and updated as appropriate: allergies, current medications, past family history, past medical history, past social history, past surgical history and problem list.    Past Medical History:   Diagnosis Date   • CHF (congestive heart failure) (HCC)    • Hypertension    • Thyroid disease          reports that she has never smoked. She does not have any smokeless tobacco history on file. She reports that she does not drink alcohol.     No family history on  file.    ROS     Review of Systems  Constitutional: No wt loss, fever, fatigue  Gastrointestinal: No nausea, abdominal pain  Behavioral/Psych: No insomnia or anxiety  Cardiovascular: denies chest pain, and shortness of breath.      Objective:           Vitals and nursing note reviewed.   Constitutional:       Appearance: Well-developed.   HENT:      Head: Normocephalic.      Right Ear: External ear normal.      Left Ear: External ear normal.   Neck:      Vascular: No JVD.   Pulmonary:      Effort: Pulmonary effort is normal. No respiratory distress.      Breath sounds: Normal breath sounds. No stridor. No rales.   Cardiovascular:      Normal rate. Regular rhythm.      No gallop.      Comments: Mobility issues as she uses a walker  Pulses:     Intact distal pulses.   Abdominal:      General: Bowel sounds are normal. There is no distension.      Palpations: Abdomen is soft.      Tenderness: There is no abdominal tenderness. There is no guarding.   Musculoskeletal: Normal range of motion.         General: No tenderness.      Cervical back: Normal range of motion. Skin:     General: Skin is warm.   Neurological:      Mental Status: Alert and oriented to person, place, and time.      Deep Tendon Reflexes: Reflexes are normal and symmetric.   Psychiatric:         Judgment: Judgment normal.         Procedures    9/30/22  Interpretation Summary    • Estimated right ventricular systolic pressure from tricuspid regurgitation is normal (<35 mmHg).  • Peak velocity of the flow distal to the aortic valve is 427.7 cm/s. Aortic valve maximum pressure gradient is 73.2 mmHg. Aortic valve mean pressure gradient is 41.5 mmHg. Aortic valve dimensionless index is 0.2 .  • Severe aortic valve stenosis is present. Aortic valve area is 0.63 cm2.  • Calculated left ventricular EF = 65.8% Estimated left ventricular EF was in agreement with the calculated left ventricular EF.  • Left ventricular diastolic function is consistent with (grade Ia  w/high LAP) impaired relaxation.  • The right atrial cavity is borderline dilated.  • There is a small (<1cm) pericardial effusion.      Current Outpatient Medications:   •  carvedilol (COREG) 3.125 MG tablet, Take 1 tablet by mouth Daily., Disp: 60 tablet, Rfl: 3  •  chlordiazePOXIDE (LIBRIUM) 5 MG capsule, TAKE 1 CAPSULE BY MOUTH 3 (THREE) TIMES DAILY AS NEEDED FOR ANXIETY. MAX DAILY AMOUNT: 15 MG, Disp: , Rfl:   •  cholecalciferol (VITAMIN D3) 25 MCG (1000 UT) tablet, Take 1,000 Units by mouth Daily., Disp: , Rfl:   •  clidinium-chlordiazePOXIDE (LIBRAX) 5-2.5 MG per capsule, Take 1 capsule by mouth 2 (Two) Times a Day., Disp: , Rfl:   •  cyproheptadine (PERIACTIN) 4 MG tablet, Take 4 mg by mouth 3 (Three) Times a Day., Disp: , Rfl:   •  levothyroxine (SYNTHROID, LEVOTHROID) 50 MCG tablet, Take 1 tablet by mouth Every Morning for 30 days., Disp: 30 tablet, Rfl: 0  •  levothyroxine (SYNTHROID, LEVOTHROID) 50 MCG tablet, Take 50 mcg by mouth Daily., Disp: , Rfl:   •  PROBIOTIC PRODUCT PO, Take  by mouth., Disp: , Rfl:   •  torsemide (DEMADEX) 10 MG tablet, Take 1 tablet by mouth Daily for 30 days., Disp: 30 tablet, Rfl: 0     Assessment:        Patient Active Problem List   Diagnosis   • CHF (congestive heart failure) ([facility])   • Combined systolic and diastolic congestive heart failure, unspecified HF chronicity ([facility])               Plan:   1.  Chronic combined CHF with severe aortic stenosis.  Medical management for aortic stenosis.  She follows nephrology regarding her torsemide.  I have not been unable to add some goal-directed therapy due to her age.  She states she restarted her norvasc and stopped the coreg because when her HR was 58 she felt bad. Blood pressure stable. She follows nephrology for her lab work.      Please monitor and stay on a low sodium (<2000 mg/day) diet and 2000ml of fluid. Exercise 30 minutes a day for 2-3 times a week.  Please weigh yourself daily if you gains more than 2 pounds in 1 day  or 5 pounds in 1 week. Check for swelling in your feet, ankles, legs, and stomach.  Monitor for signs of fatigue, shortness of breath or cough.  Call the office for recommendations.    2.  Aortic stenosis: Conservative management.    3.  Hypertension: Today her blood pressure is stable    Educated patient on exercising for at least 30 minutes a day for 2 to 3 days a week. Importance of controlling hypertension and blood pressure checkup on the regular basis has been explained. Hypertension as a silent killer has been discussed. Risk reduction of the weight and regular exercises to control the hypertension has been explained.      No diagnosis found.    There are no diagnoses linked to this encounter.    COUNSELING: lynda Escobar was given to patient for the following topics: diagnostic results, risk factor reductions, impressions, risks and benefits of treatment options and importance of treatment compliance .       SMOKING COUNSELING: Denies      1. She will follow up with primary due to swallowing issues.   2. She would like to follow up in 3 months, unless she needs to be seen sooner.  No changes from our perspective today.    Sincerely,   JOSÉ MIGUEL Corbett  [facility]  03/01/23  11:01 EST    EMR Dragon/Transcription disclaimer:   Much of this encounter note is an electronic transcription/translation of spoken language to printed text. The electronic translation of spoken language may permit erroneous, or at times, nonsensical words or phrases to be inadvertently transcribed; Although I have reviewed the note for such errors, some may still exist.

## 2023-05-18 ENCOUNTER — OFFICE VISIT (OUTPATIENT)
Dept: CARDIOLOGY | Facility: CLINIC | Age: 88
End: 2023-05-18
Payer: COMMERCIAL

## 2023-05-18 VITALS
HEIGHT: 56 IN | SYSTOLIC BLOOD PRESSURE: 136 MMHG | BODY MASS INDEX: 21.37 KG/M2 | OXYGEN SATURATION: 95 % | WEIGHT: 95 LBS | DIASTOLIC BLOOD PRESSURE: 65 MMHG | HEART RATE: 71 BPM

## 2023-05-18 DIAGNOSIS — I35.0 AORTIC VALVE STENOSIS, ETIOLOGY OF CARDIAC VALVE DISEASE UNSPECIFIED: Primary | ICD-10-CM

## 2023-05-18 DIAGNOSIS — I10 PRIMARY HYPERTENSION: ICD-10-CM

## 2023-05-18 DIAGNOSIS — I50.42 CHRONIC COMBINED SYSTOLIC AND DIASTOLIC CONGESTIVE HEART FAILURE: ICD-10-CM

## 2023-05-18 NOTE — PROGRESS NOTES
" Subjective:        Joycelyn Esqueda is a 89 y.o. female who here for follow up    CC  Follow up aortic stenosis  HPI  No cp     Problems Addressed this Visit        Cardiac and Vasculature    Aortic valve stenosis - Primary    Chronic combined systolic and diastolic congestive heart failure    Primary hypertension   Diagnoses       Codes Comments    Aortic valve stenosis, etiology of cardiac valve disease unspecified    -  Primary ICD-10-CM: I35.0  ICD-9-CM: 424.1     Chronic combined systolic and diastolic congestive heart failure     ICD-10-CM: I50.42  ICD-9-CM: 428.42, 428.0     Primary hypertension     ICD-10-CM: I10  ICD-9-CM: 401.9         .    The following portions of the patient's history were reviewed and updated as appropriate: allergies, current medications, past family history, past medical history, past social history, past surgical history and problem list.    Past Medical History:   Diagnosis Date   • CHF (congestive heart failure)    • Hypertension    • Thyroid disease      reports that she has never smoked. She does not have any smokeless tobacco history on file. She reports that she does not drink alcohol. No family history on file.    Review of Systems  Constitutional: No wt loss, fever, fatigue  Gastrointestinal: No nausea, abdominal pain  Behavioral/Psych: No insomnia or anxiety   Cardiovascular no cp  Objective:       Physical Exam  /65 (BP Location: Left arm, Patient Position: Sitting, Cuff Size: Adult)   Pulse 71   Ht 142.2 cm (56\")   Wt 43.1 kg (95 lb)   SpO2 95%   BMI 21.30 kg/m²   General appearance: No acute changes   Neck: Trachea midline; NECK, supple, no thyromegaly or lymphadenopathy   Lungs: Normal size and shape, normal breath sounds, equal distribution of air, no rales and rhonchi   CV: S1-S2 regular, sys murmurs, no rub, no gallop   Abdomen: Soft, nontender; no masses , no abnormal abdominal sounds   Extremities: No deformity , normal color , no peripheral edema "   Skin: Normal temperature, turgor and texture; no rash, ulcers          Procedures      Echocardiogram:        Current Outpatient Medications:   •  amLODIPine (NORVASC) 2.5 MG tablet, Take 1 tablet by mouth Daily., Disp: 30 tablet, Rfl: 11  •  chlordiazePOXIDE (LIBRIUM) 5 MG capsule, TAKE 1 CAPSULE BY MOUTH 3 (THREE) TIMES DAILY AS NEEDED FOR ANXIETY. MAX DAILY AMOUNT: 15 MG, Disp: , Rfl:   •  cholecalciferol (VITAMIN D3) 25 MCG (1000 UT) tablet, Take 1 tablet by mouth Daily., Disp: , Rfl:   •  clidinium-chlordiazePOXIDE (LIBRAX) 5-2.5 MG per capsule, Take 1 capsule by mouth 2 (Two) Times a Day., Disp: , Rfl:   •  cyproheptadine (PERIACTIN) 4 MG tablet, Take 1 tablet by mouth 3 (Three) Times a Day., Disp: , Rfl:   •  levothyroxine (SYNTHROID, LEVOTHROID) 50 MCG tablet, Take 1 tablet by mouth Daily., Disp: , Rfl:   •  PROBIOTIC PRODUCT PO, Take  by mouth., Disp: , Rfl:   •  levothyroxine (SYNTHROID, LEVOTHROID) 50 MCG tablet, Take 1 tablet by mouth Every Morning for 30 days., Disp: 30 tablet, Rfl: 0  •  torsemide (DEMADEX) 10 MG tablet, Take 1 tablet by mouth Daily for 30 days., Disp: 30 tablet, Rfl: 0   Assessment:        Patient Active Problem List   Diagnosis   • CHF (congestive heart failure)   • Combined systolic and diastolic congestive heart failure, unspecified HF chronicity   • Aortic valve stenosis   • Chronic combined systolic and diastolic congestive heart failure   • Primary hypertension               Plan:            ICD-10-CM ICD-9-CM   1. Aortic valve stenosis, etiology of cardiac valve disease unspecified  I35.0 424.1   2. Chronic combined systolic and diastolic congestive heart failure  I50.42 428.42     428.0   3. Primary hypertension  I10 401.9     1. Aortic valve stenosis, etiology of cardiac valve disease unspecified  copn conservatively    2. Chronic combined systolic and diastolic congestive heart failure  compensated    3. Primary hypertension  Con current meds       1  YR  COUNSELING:    Joycelyn Escobar was given to patient for the following topics: diagnostic results, risk factor reductions, impressions, risks and benefits of treatment options and importance of treatment compliance .       SMOKING COUNSELING:        Dictated using Dragon dictation

## 2023-05-20 PROBLEM — I10 PRIMARY HYPERTENSION: Status: ACTIVE | Noted: 2023-05-20

## 2023-05-20 PROBLEM — I50.42 CHRONIC COMBINED SYSTOLIC AND DIASTOLIC CONGESTIVE HEART FAILURE: Status: ACTIVE | Noted: 2023-05-20

## 2023-05-20 PROBLEM — I35.0 AORTIC VALVE STENOSIS: Status: ACTIVE | Noted: 2023-05-20
